# Patient Record
Sex: MALE | Race: WHITE | NOT HISPANIC OR LATINO | Employment: OTHER | URBAN - METROPOLITAN AREA
[De-identification: names, ages, dates, MRNs, and addresses within clinical notes are randomized per-mention and may not be internally consistent; named-entity substitution may affect disease eponyms.]

---

## 2023-03-09 ENCOUNTER — TELEPHONE (OUTPATIENT)
Dept: PRIMARY CARE | Facility: CLINIC | Age: 68
End: 2023-03-09
Payer: MEDICARE

## 2023-05-17 DIAGNOSIS — E11.9 TYPE 2 DIABETES MELLITUS WITHOUT COMPLICATION, WITH LONG-TERM CURRENT USE OF INSULIN (MULTI): Primary | ICD-10-CM

## 2023-05-17 DIAGNOSIS — Z79.4 TYPE 2 DIABETES MELLITUS WITHOUT COMPLICATION, WITH LONG-TERM CURRENT USE OF INSULIN (MULTI): Primary | ICD-10-CM

## 2023-05-17 PROBLEM — L03.115 CELLULITIS OF RIGHT LOWER EXTREMITY: Status: ACTIVE | Noted: 2023-05-17

## 2023-05-17 PROBLEM — F91.9 BEHAVIORAL DISORDER AS SEQUELA OF CEREBRAL INFARCTION: Status: ACTIVE | Noted: 2023-05-17

## 2023-05-17 PROBLEM — I63.9 CEREBROVASCULAR ACCIDENT (CVA) (MULTI): Status: ACTIVE | Noted: 2023-05-17

## 2023-05-17 PROBLEM — R41.841 COGNITIVE COMMUNICATION DEFICIT: Status: ACTIVE | Noted: 2023-05-17

## 2023-05-17 PROBLEM — I69.30 H/O: STROKE WITH RESIDUAL EFFECTS: Status: ACTIVE | Noted: 2023-05-17

## 2023-05-17 PROBLEM — R48.2 APRAXIA: Status: ACTIVE | Noted: 2023-05-17

## 2023-05-17 PROBLEM — M17.11 OSTEOARTHRITIS OF RIGHT KNEE: Status: ACTIVE | Noted: 2023-05-17

## 2023-05-17 PROBLEM — I63.9 CARDIOEMBOLIC STROKE (MULTI): Status: ACTIVE | Noted: 2023-05-17

## 2023-05-17 PROBLEM — I69.398 BEHAVIORAL DISORDER AS SEQUELA OF CEREBRAL INFARCTION: Status: ACTIVE | Noted: 2023-05-17

## 2023-05-17 PROBLEM — I10 ESSENTIAL HYPERTENSION: Status: ACTIVE | Noted: 2023-05-17

## 2023-05-17 PROBLEM — R55 CONVULSIVE SYNCOPE: Status: ACTIVE | Noted: 2023-05-17

## 2023-05-17 PROBLEM — R26.89 DECREASED FUNCTIONAL MOBILITY: Status: ACTIVE | Noted: 2023-05-17

## 2023-05-17 PROBLEM — B07.8 COMMON WART: Status: ACTIVE | Noted: 2023-05-17

## 2023-05-17 PROBLEM — I69.322 DYSARTHRIA DUE TO RECENT STROKE: Status: ACTIVE | Noted: 2023-05-17

## 2023-05-17 PROBLEM — I25.10 CAD (CORONARY ARTERY DISEASE): Status: ACTIVE | Noted: 2023-05-17

## 2023-05-17 PROBLEM — M17.11 ARTHRITIS OF KNEE, RIGHT: Status: ACTIVE | Noted: 2023-05-17

## 2023-05-17 PROBLEM — I69.320 APHASIA, POST-STROKE: Status: ACTIVE | Noted: 2023-05-17

## 2023-05-17 PROBLEM — I35.0 AORTIC STENOSIS: Status: ACTIVE | Noted: 2023-05-17

## 2023-05-17 PROBLEM — R27.8 DECREASED COORDINATION: Status: ACTIVE | Noted: 2023-05-17

## 2023-05-17 PROBLEM — I42.9 CARDIOMYOPATHY (MULTI): Status: ACTIVE | Noted: 2023-05-17

## 2023-05-17 PROBLEM — J01.41 ACUTE RECURRENT PANSINUSITIS: Status: ACTIVE | Noted: 2023-05-17

## 2023-05-17 RX ORDER — LANCETS 33 GAUGE
EACH MISCELLANEOUS
COMMUNITY
Start: 2023-01-07 | End: 2024-02-11 | Stop reason: ENTERED-IN-ERROR

## 2023-05-17 RX ORDER — INSULIN GLARGINE 100 [IU]/ML
10 INJECTION, SOLUTION SUBCUTANEOUS NIGHTLY
Qty: 3 ML | Refills: 1 | Status: SHIPPED | OUTPATIENT
Start: 2023-05-17 | End: 2023-11-02

## 2023-05-17 RX ORDER — QUETIAPINE FUMARATE 25 MG/1
1 TABLET, FILM COATED ORAL NIGHTLY
COMMUNITY
Start: 2022-08-23 | End: 2023-11-28 | Stop reason: WASHOUT

## 2023-05-17 RX ORDER — BLOOD-GLUCOSE METER
EACH MISCELLANEOUS
COMMUNITY
Start: 2020-10-21 | End: 2024-02-11 | Stop reason: ENTERED-IN-ERROR

## 2023-05-17 RX ORDER — INSULIN GLARGINE 100 [IU]/ML
INJECTION, SOLUTION SUBCUTANEOUS
Qty: 3 ML | OUTPATIENT
Start: 2023-05-17

## 2023-05-17 RX ORDER — RIVAROXABAN 20 MG/1
1 TABLET, FILM COATED ORAL EVERY EVENING
COMMUNITY
End: 2024-02-29 | Stop reason: SDUPTHER

## 2023-05-17 RX ORDER — METFORMIN HYDROCHLORIDE 500 MG/1
1 TABLET, EXTENDED RELEASE ORAL EVERY EVENING
COMMUNITY
Start: 2020-10-21 | End: 2024-02-19

## 2023-05-17 RX ORDER — ASPIRIN 81 MG/1
1 TABLET ORAL DAILY
COMMUNITY
Start: 2022-08-01 | End: 2023-08-23 | Stop reason: ALTCHOICE

## 2023-05-17 RX ORDER — INSULIN GLARGINE 100 [IU]/ML
INJECTION, SOLUTION SUBCUTANEOUS
COMMUNITY
Start: 2022-10-13 | End: 2023-05-17 | Stop reason: SDUPTHER

## 2023-05-17 RX ORDER — CLOPIDOGREL BISULFATE 75 MG/1
TABLET ORAL
COMMUNITY
Start: 2022-10-13 | End: 2023-07-10

## 2023-05-17 RX ORDER — PEN NEEDLE, DIABETIC 31 GX5/16"
NEEDLE, DISPOSABLE MISCELLANEOUS
COMMUNITY
Start: 2023-02-27 | End: 2023-08-16

## 2023-05-17 RX ORDER — METOPROLOL SUCCINATE 50 MG/1
1 TABLET, EXTENDED RELEASE ORAL DAILY
COMMUNITY
Start: 2020-11-02 | End: 2023-12-07 | Stop reason: SDUPTHER

## 2023-05-17 RX ORDER — FENOFIBRATE 160 MG/1
1 TABLET ORAL DAILY
COMMUNITY
Start: 2021-02-17 | End: 2023-05-19

## 2023-05-17 RX ORDER — TAMSULOSIN HYDROCHLORIDE 0.4 MG/1
1 CAPSULE ORAL NIGHTLY
COMMUNITY
Start: 2021-02-01 | End: 2023-07-10

## 2023-05-17 RX ORDER — MULTIVIT-MIN/IRON FUM/FOLIC AC 7.5 MG-4
1 TABLET ORAL EVERY MORNING
COMMUNITY

## 2023-05-17 RX ORDER — ATORVASTATIN CALCIUM 80 MG/1
1 TABLET, FILM COATED ORAL DAILY
COMMUNITY
Start: 2021-02-01 | End: 2024-01-30

## 2023-05-17 RX ORDER — LOSARTAN POTASSIUM 25 MG/1
1 TABLET ORAL DAILY
COMMUNITY
Start: 2021-02-01 | End: 2023-12-15

## 2023-05-17 RX ORDER — LANOLIN ALCOHOL/MO/W.PET/CERES
CREAM (GRAM) TOPICAL
COMMUNITY
Start: 2022-10-13 | End: 2024-02-11 | Stop reason: ENTERED-IN-ERROR

## 2023-05-17 RX ORDER — FUROSEMIDE 40 MG/1
1 TABLET ORAL DAILY
COMMUNITY
Start: 2021-02-01 | End: 2023-12-15

## 2023-05-25 ENCOUNTER — OFFICE VISIT (OUTPATIENT)
Dept: PRIMARY CARE | Facility: CLINIC | Age: 68
End: 2023-05-25
Payer: MEDICARE

## 2023-05-25 VITALS
OXYGEN SATURATION: 97 % | HEIGHT: 68 IN | BODY MASS INDEX: 28.22 KG/M2 | WEIGHT: 186.2 LBS | SYSTOLIC BLOOD PRESSURE: 120 MMHG | HEART RATE: 76 BPM | DIASTOLIC BLOOD PRESSURE: 80 MMHG | TEMPERATURE: 97.5 F

## 2023-05-25 DIAGNOSIS — I35.0 NONRHEUMATIC AORTIC VALVE STENOSIS: ICD-10-CM

## 2023-05-25 DIAGNOSIS — Z11.59 NEED FOR HEPATITIS C SCREENING TEST: ICD-10-CM

## 2023-05-25 DIAGNOSIS — I25.10 CORONARY ARTERY DISEASE INVOLVING NATIVE HEART WITHOUT ANGINA PECTORIS, UNSPECIFIED VESSEL OR LESION TYPE: ICD-10-CM

## 2023-05-25 DIAGNOSIS — Z79.4 TYPE 2 DIABETES MELLITUS WITHOUT COMPLICATION, WITH LONG-TERM CURRENT USE OF INSULIN (MULTI): Primary | ICD-10-CM

## 2023-05-25 DIAGNOSIS — I10 ESSENTIAL HYPERTENSION: ICD-10-CM

## 2023-05-25 DIAGNOSIS — I69.322 DYSARTHRIA DUE TO RECENT STROKE: ICD-10-CM

## 2023-05-25 DIAGNOSIS — I63.9 CARDIOEMBOLIC STROKE (MULTI): ICD-10-CM

## 2023-05-25 DIAGNOSIS — Z12.5 SCREENING FOR PROSTATE CANCER: ICD-10-CM

## 2023-05-25 DIAGNOSIS — Z00.00 ROUTINE GENERAL MEDICAL EXAMINATION AT HEALTH CARE FACILITY: ICD-10-CM

## 2023-05-25 DIAGNOSIS — I25.5 ISCHEMIC CARDIOMYOPATHY: ICD-10-CM

## 2023-05-25 DIAGNOSIS — E11.9 TYPE 2 DIABETES MELLITUS WITHOUT COMPLICATION, WITH LONG-TERM CURRENT USE OF INSULIN (MULTI): Primary | ICD-10-CM

## 2023-05-25 LAB — HBA1C MFR BLD: 8.8 % (ref 4.2–6.5)

## 2023-05-25 PROCEDURE — 99497 ADVNCD CARE PLAN 30 MIN: CPT | Performed by: FAMILY MEDICINE

## 2023-05-25 PROCEDURE — 3074F SYST BP LT 130 MM HG: CPT | Performed by: FAMILY MEDICINE

## 2023-05-25 PROCEDURE — 2028F FOOT EXAM PERFORMED: CPT | Performed by: FAMILY MEDICINE

## 2023-05-25 PROCEDURE — G0439 PPPS, SUBSEQ VISIT: HCPCS | Performed by: FAMILY MEDICINE

## 2023-05-25 PROCEDURE — 99214 OFFICE O/P EST MOD 30 MIN: CPT | Performed by: FAMILY MEDICINE

## 2023-05-25 PROCEDURE — G0442 ANNUAL ALCOHOL SCREEN 15 MIN: HCPCS | Performed by: FAMILY MEDICINE

## 2023-05-25 PROCEDURE — 1158F ADVNC CARE PLAN TLK DOCD: CPT | Performed by: FAMILY MEDICINE

## 2023-05-25 PROCEDURE — 83036 HEMOGLOBIN GLYCOSYLATED A1C: CPT | Performed by: FAMILY MEDICINE

## 2023-05-25 PROCEDURE — 1160F RVW MEDS BY RX/DR IN RCRD: CPT | Performed by: FAMILY MEDICINE

## 2023-05-25 PROCEDURE — 1170F FXNL STATUS ASSESSED: CPT | Performed by: FAMILY MEDICINE

## 2023-05-25 PROCEDURE — 1036F TOBACCO NON-USER: CPT | Performed by: FAMILY MEDICINE

## 2023-05-25 PROCEDURE — 3052F HG A1C>EQUAL 8.0%<EQUAL 9.0%: CPT | Performed by: FAMILY MEDICINE

## 2023-05-25 PROCEDURE — G0446 INTENS BEHAVE THER CARDIO DX: HCPCS | Performed by: FAMILY MEDICINE

## 2023-05-25 PROCEDURE — 4010F ACE/ARB THERAPY RXD/TAKEN: CPT | Performed by: FAMILY MEDICINE

## 2023-05-25 PROCEDURE — 3079F DIAST BP 80-89 MM HG: CPT | Performed by: FAMILY MEDICINE

## 2023-05-25 PROCEDURE — 1159F MED LIST DOCD IN RCRD: CPT | Performed by: FAMILY MEDICINE

## 2023-05-25 ASSESSMENT — PATIENT HEALTH QUESTIONNAIRE - PHQ9
SUM OF ALL RESPONSES TO PHQ9 QUESTIONS 1 & 2: 0
1. LITTLE INTEREST OR PLEASURE IN DOING THINGS: NOT AT ALL
SUM OF ALL RESPONSES TO PHQ9 QUESTIONS 1 AND 2: 0
1. LITTLE INTEREST OR PLEASURE IN DOING THINGS: NOT AT ALL
2. FEELING DOWN, DEPRESSED OR HOPELESS: NOT AT ALL
2. FEELING DOWN, DEPRESSED OR HOPELESS: NOT AT ALL

## 2023-05-25 ASSESSMENT — ACTIVITIES OF DAILY LIVING (ADL)
DOING_HOUSEWORK: NEEDS ASSISTANCE
TAKING_MEDICATION: INDEPENDENT
GROCERY_SHOPPING: NEEDS ASSISTANCE
BATHING: INDEPENDENT
DRESSING: INDEPENDENT
MANAGING_FINANCES: NEEDS ASSISTANCE

## 2023-05-25 ASSESSMENT — LIFESTYLE VARIABLES
HOW OFTEN DO YOU HAVE SIX OR MORE DRINKS ON ONE OCCASION: NEVER
HOW OFTEN DO YOU HAVE A DRINK CONTAINING ALCOHOL: NEVER

## 2023-05-25 ASSESSMENT — ENCOUNTER SYMPTOMS
OCCASIONAL FEELINGS OF UNSTEADINESS: 0
LOSS OF SENSATION IN FEET: 0
DEPRESSION: 0

## 2023-05-25 ASSESSMENT — PAIN SCALES - GENERAL: PAINLEVEL: 2

## 2023-05-25 NOTE — ACP (ADVANCE CARE PLANNING)
Advance Care Planning Note     Discussion Date: 05/25/23   Discussion Participants: patient    The patient wishes to discuss Advance Care Planning today and the following is a brief summary of our discussion.     Patient has capacity to make their own medical decisions: Yes  Health Care Agent/Surrogate Decision Maker documented in chart: Yes    Documents on file and valid:  Advance Directive/Living Will: Yes   Health Care Power of : Yes  Other:     Communication of Medical Status/Prognosis:   Prognosis is fair    Communication of Treatment Goals/Options:   Discussed treatment goals with patient and his daughter   Treatment Decisions  Treatment discussed    Time Statement: Total face to face time spent on advance care planning was 16 minutes with 16 minutes spent in counseling, including the explanation.    Kwabena Gomez DO  5/25/2023 12:00 PM

## 2023-05-25 NOTE — PROGRESS NOTES
"Subjective   Reason for Visit: Isaiah Jin is an 67 y.o. male here for a Medicare Wellness visit.     Past Medical, Surgical, and Family History reviewed and updated in chart.    Reviewed all medications by prescribing practitioner or clinical pharmacist (such as prescriptions, OTCs, herbal therapies and supplements) and documented in the medical record.    HPI  67-year-old male for Medicare wellness visit and checkup on diabetes, dysarthria from stroke.  Patient Care Team:  Kwabena ZIMMERMAN DO as PCP - General  [facility]IMMERMAN [facility]     Review of Systems  Constitutional: no chills, no fever and no night sweats.   Eyes: no blurred vision and no eyesight problems.   ENT: no hearing loss, no nasal congestion, no nasal discharge, no hoarseness and no sore throat.   Cardiovascular: no chest pain, no intermittent leg claudication, no lower extremity edema, no palpitations and no syncope.   Respiratory: no cough, no shortness of breath during exertion, no shortness of breath at rest and no wheezing.   Gastrointestinal: no abdominal pain, no blood in stools, no constipation, no diarrhea, no melena, no nausea, no rectal pain and no vomiting.   Genitourinary: no dysuria, no change in urinary frequency, no urinary hesitancy and no feelings of urinary urgency.   Neurological: no difficulty walking, no headache, no limb weakness, no numbness and no tingling.   Psychiatric: no anxiety, no depression, no anhedonia and no substance use disorders.   Endocrine: no recent weight gain and no recent weight loss.   Hematologic/Lymphatic: no tendency for easy bruising and no swollen glands.  Objective   Vitals:  /80 (BP Location: Left arm, Patient Position: Sitting, BP Cuff Size: Adult)   Pulse 76   Temp 36.4 °C (97.5 °F) (Temporal)   Ht 1.727 m (5' 8\")   Wt 84.5 kg (186 lb 3.2 oz)   SpO2 97%   BMI 28.31 kg/m²       Physical Exam  Constitutional: Alert and in no acute distress. " Well developed, well nourished.   Eyes: Pupils were equal in size, round, reactive to light (PERRL) with normal accommodation and extraocular movements intact (EOMI). Ophthalmoscopic examination: Normal.   Ears, Nose, Mouth, and Throat: External inspection of ears and nose: Normal. Otoscopic examination: Normal. Lips, teeth, and gums: Normal. Oropharynx: Normal.   Neck: No neck mass was observed. Supple. Thyroid not enlarged and there were no palpable thyroid nodules.   Cardiovascular: Heart rate and rhythm were normal, normal S1 and S2, no gallops, 2/6 SEmurmur and no pericardial rub. Carotid pulses: Normal with no bruits. Abdominal aorta: Normal. Pedal pulses: Normal. No peripheral edema.   Pulmonary: No respiratory distress. Clear bilateral breath sounds.   Abdomen: Soft nontender; no abdominal mass palpated. Normal bowel sounds. No organomegaly.   Skin: Normal skin color and pigmentation, normal skin turgor, and no rash.   Psychiatric: Judgment and insight: Intact. Mood and affect: Normal.  Assessment/Plan   Problem List Items Addressed This Visit          Nervous    Cardioembolic stroke (CMS/HCC)    Relevant Orders    Referral to Speech Therapy    Dysarthria due to recent stroke    Relevant Orders    Referral to Speech Therapy       Circulatory    Aortic stenosis    CAD (coronary artery disease)    Cardiomyopathy (CMS/HCC)    Essential hypertension    Relevant Orders    Lipid Panel    Comprehensive Metabolic Panel    CBC and Auto Differential    Urinalysis with Reflex Microscopic       Endocrine/Metabolic    Diabetes mellitus type 2, uncomplicated (CMS/HCC) - Primary    Relevant Orders    POCT Glycosylated Hemoglobin (HGB A1C) docked device    Albumin, urine, random       Other    Need for hepatitis C screening test    Relevant Orders    Hepatitis C antibody    Screening for prostate cancer    Relevant Orders    Prostate Specific Antigen, Screen    Routine general medical examination at health care facility      #1.  Type 2 diabetes mellitus is stable, but hemoglobin A1c is elevated.  Patient will discontinue sweet drinks.  Continue current medications.  Follow-up in 3 months.  2.  Cardioembolic stroke symptoms are stable.  Continue current medication.  Follow-up in 6 months.  3.  I spent 15 minutes screening for alcohol use.  4.  I spent 15 minutes face-to-face with this individual discussing the cardiovascular risk and behavioral therapies of nutritional choices, exercise, and elimination of habits contributing to risk. We agreed on a plan how they may be able to reduce their current cardiovascular risk. Aspirin use was discussed and encouraged. Patient's 10 year cardiovascular risk estimate calculates at 23.2%  5.  Ischemic cardiomyopathy is stable.  Symptoms stable.  Follow-up in 6 months.

## 2023-05-26 ENCOUNTER — LAB (OUTPATIENT)
Dept: LAB | Facility: LAB | Age: 68
End: 2023-05-26
Payer: MEDICARE

## 2023-05-26 DIAGNOSIS — Z79.4 TYPE 2 DIABETES MELLITUS WITHOUT COMPLICATION, WITH LONG-TERM CURRENT USE OF INSULIN (MULTI): ICD-10-CM

## 2023-05-26 DIAGNOSIS — Z11.59 NEED FOR HEPATITIS C SCREENING TEST: ICD-10-CM

## 2023-05-26 DIAGNOSIS — I10 ESSENTIAL HYPERTENSION: ICD-10-CM

## 2023-05-26 DIAGNOSIS — E11.9 TYPE 2 DIABETES MELLITUS WITHOUT COMPLICATION, WITH LONG-TERM CURRENT USE OF INSULIN (MULTI): ICD-10-CM

## 2023-05-26 DIAGNOSIS — Z12.5 SCREENING FOR PROSTATE CANCER: ICD-10-CM

## 2023-05-26 LAB
ALANINE AMINOTRANSFERASE (SGPT) (U/L) IN SER/PLAS: 18 U/L (ref 10–52)
ALBUMIN (G/DL) IN SER/PLAS: 4.5 G/DL (ref 3.4–5)
ALBUMIN (MG/L) IN URINE: 24.5 MG/L
ALBUMIN/CREATININE (UG/MG) IN URINE: 12 UG/MG CRT (ref 0–30)
ALKALINE PHOSPHATASE (U/L) IN SER/PLAS: 59 U/L (ref 33–136)
ANION GAP IN SER/PLAS: 14 MMOL/L (ref 10–20)
APPEARANCE, URINE: NORMAL
ASPARTATE AMINOTRANSFERASE (SGOT) (U/L) IN SER/PLAS: 15 U/L (ref 9–39)
BASOPHILS (10*3/UL) IN BLOOD BY AUTOMATED COUNT: 0.04 X10E9/L (ref 0–0.1)
BASOPHILS/100 LEUKOCYTES IN BLOOD BY AUTOMATED COUNT: 0.5 % (ref 0–2)
BILIRUBIN TOTAL (MG/DL) IN SER/PLAS: 0.5 MG/DL (ref 0–1.2)
BILIRUBIN, URINE: NEGATIVE
BLOOD, URINE: NEGATIVE
CALCIUM (MG/DL) IN SER/PLAS: 9.7 MG/DL (ref 8.6–10.3)
CARBON DIOXIDE, TOTAL (MMOL/L) IN SER/PLAS: 27 MMOL/L (ref 21–32)
CHLORIDE (MMOL/L) IN SER/PLAS: 102 MMOL/L (ref 98–107)
CHOLESTEROL (MG/DL) IN SER/PLAS: 133 MG/DL (ref 0–199)
CHOLESTEROL IN HDL (MG/DL) IN SER/PLAS: 28.6 MG/DL
CHOLESTEROL/HDL RATIO: 4.7
COLOR, URINE: NORMAL
CREATININE (MG/DL) IN SER/PLAS: 1.24 MG/DL (ref 0.5–1.3)
CREATININE (MG/DL) IN URINE: 204 MG/DL (ref 20–370)
EOSINOPHILS (10*3/UL) IN BLOOD BY AUTOMATED COUNT: 0.29 X10E9/L (ref 0–0.7)
EOSINOPHILS/100 LEUKOCYTES IN BLOOD BY AUTOMATED COUNT: 3.8 % (ref 0–6)
ERYTHROCYTE DISTRIBUTION WIDTH (RATIO) BY AUTOMATED COUNT: 12.2 % (ref 11.5–14.5)
ERYTHROCYTE MEAN CORPUSCULAR HEMOGLOBIN CONCENTRATION (G/DL) BY AUTOMATED: 32.9 G/DL (ref 32–36)
ERYTHROCYTE MEAN CORPUSCULAR VOLUME (FL) BY AUTOMATED COUNT: 90 FL (ref 80–100)
ERYTHROCYTES (10*6/UL) IN BLOOD BY AUTOMATED COUNT: 4.4 X10E12/L (ref 4.5–5.9)
GFR MALE: 63 ML/MIN/1.73M2
GLUCOSE (MG/DL) IN SER/PLAS: 191 MG/DL (ref 74–99)
GLUCOSE, URINE: NEGATIVE MG/DL
HEMATOCRIT (%) IN BLOOD BY AUTOMATED COUNT: 39.8 % (ref 41–52)
HEMOGLOBIN (G/DL) IN BLOOD: 13.1 G/DL (ref 13.5–17.5)
HEPATITIS C VIRUS AB PRESENCE IN SERUM: NONREACTIVE
IMMATURE GRANULOCYTES/100 LEUKOCYTES IN BLOOD BY AUTOMATED COUNT: 0.4 % (ref 0–0.9)
KETONES, URINE: NEGATIVE MG/DL
LDL: 28 MG/DL (ref 0–99)
LEUKOCYTE ESTERASE, URINE: NEGATIVE
LEUKOCYTES (10*3/UL) IN BLOOD BY AUTOMATED COUNT: 7.6 X10E9/L (ref 4.4–11.3)
LYMPHOCYTES (10*3/UL) IN BLOOD BY AUTOMATED COUNT: 1.75 X10E9/L (ref 1.2–4.8)
LYMPHOCYTES/100 LEUKOCYTES IN BLOOD BY AUTOMATED COUNT: 23.1 % (ref 13–44)
MONOCYTES (10*3/UL) IN BLOOD BY AUTOMATED COUNT: 0.73 X10E9/L (ref 0.1–1)
MONOCYTES/100 LEUKOCYTES IN BLOOD BY AUTOMATED COUNT: 9.6 % (ref 2–10)
NEUTROPHILS (10*3/UL) IN BLOOD BY AUTOMATED COUNT: 4.74 X10E9/L (ref 1.2–7.7)
NEUTROPHILS/100 LEUKOCYTES IN BLOOD BY AUTOMATED COUNT: 62.6 % (ref 40–80)
NITRITE, URINE: NEGATIVE
NON HDL CHOLESTEROL: 104 MG/DL
NRBC (PER 100 WBCS) BY AUTOMATED COUNT: 0 /100 WBC (ref 0–0)
PH, URINE: 5 (ref 5–8)
PLATELETS (10*3/UL) IN BLOOD AUTOMATED COUNT: 271 X10E9/L (ref 150–450)
POTASSIUM (MMOL/L) IN SER/PLAS: 4.3 MMOL/L (ref 3.5–5.3)
PROSTATE SPECIFIC ANTIGEN,SCREEN: 0.86 NG/ML (ref 0–4)
PROTEIN TOTAL: 7.1 G/DL (ref 6.4–8.2)
PROTEIN, URINE: NEGATIVE MG/DL
SODIUM (MMOL/L) IN SER/PLAS: 139 MMOL/L (ref 136–145)
SPECIFIC GRAVITY, URINE: 1.02 (ref 1–1.03)
TRIGLYCERIDE (MG/DL) IN SER/PLAS: 380 MG/DL (ref 0–149)
UREA NITROGEN (MG/DL) IN SER/PLAS: 20 MG/DL (ref 6–23)
UROBILINOGEN, URINE: <2 MG/DL (ref 0–1.9)
VLDL: 76 MG/DL (ref 0–40)

## 2023-05-26 PROCEDURE — 86803 HEPATITIS C AB TEST: CPT

## 2023-05-26 PROCEDURE — 80053 COMPREHEN METABOLIC PANEL: CPT

## 2023-05-26 PROCEDURE — 85025 COMPLETE CBC W/AUTO DIFF WBC: CPT

## 2023-05-26 PROCEDURE — 80061 LIPID PANEL: CPT

## 2023-05-26 PROCEDURE — 84153 ASSAY OF PSA TOTAL: CPT

## 2023-05-26 PROCEDURE — 82570 ASSAY OF URINE CREATININE: CPT

## 2023-05-26 PROCEDURE — 36415 COLL VENOUS BLD VENIPUNCTURE: CPT

## 2023-05-26 PROCEDURE — 82043 UR ALBUMIN QUANTITATIVE: CPT

## 2023-05-26 PROCEDURE — 81003 URINALYSIS AUTO W/O SCOPE: CPT

## 2023-07-09 DIAGNOSIS — Z00.00 ENCOUNTER FOR GENERAL ADULT MEDICAL EXAMINATION WITHOUT ABNORMAL FINDINGS: ICD-10-CM

## 2023-07-09 DIAGNOSIS — I25.10 ATHEROSCLEROTIC HEART DISEASE OF NATIVE CORONARY ARTERY WITHOUT ANGINA PECTORIS: ICD-10-CM

## 2023-07-10 RX ORDER — CLOPIDOGREL BISULFATE 75 MG/1
TABLET ORAL
Qty: 90 TABLET | Refills: 1 | Status: SHIPPED | OUTPATIENT
Start: 2023-07-10 | End: 2024-01-30

## 2023-07-10 RX ORDER — TAMSULOSIN HYDROCHLORIDE 0.4 MG/1
CAPSULE ORAL
Qty: 90 CAPSULE | Refills: 1 | Status: SHIPPED | OUTPATIENT
Start: 2023-07-10 | End: 2024-01-30

## 2023-08-16 DIAGNOSIS — E11.9 TYPE 2 DIABETES MELLITUS WITHOUT COMPLICATIONS (MULTI): ICD-10-CM

## 2023-08-16 RX ORDER — PEN NEEDLE, DIABETIC 31 GX5/16"
NEEDLE, DISPOSABLE MISCELLANEOUS
Qty: 100 EACH | Refills: 3 | Status: SHIPPED | OUTPATIENT
Start: 2023-08-16

## 2023-08-23 ENCOUNTER — OFFICE VISIT (OUTPATIENT)
Dept: PRIMARY CARE | Facility: CLINIC | Age: 68
End: 2023-08-23
Payer: MEDICARE

## 2023-08-23 VITALS
TEMPERATURE: 97.3 F | WEIGHT: 182 LBS | OXYGEN SATURATION: 96 % | SYSTOLIC BLOOD PRESSURE: 110 MMHG | HEIGHT: 68 IN | BODY MASS INDEX: 27.58 KG/M2 | HEART RATE: 65 BPM | DIASTOLIC BLOOD PRESSURE: 64 MMHG

## 2023-08-23 DIAGNOSIS — F39 UNSPECIFIED MOOD (AFFECTIVE) DISORDER (CMS-HCC): ICD-10-CM

## 2023-08-23 DIAGNOSIS — I25.5 ISCHEMIC CARDIOMYOPATHY: ICD-10-CM

## 2023-08-23 DIAGNOSIS — J81.0 ACUTE PULMONARY EDEMA (MULTI): ICD-10-CM

## 2023-08-23 DIAGNOSIS — I11.0 HYPERTENSIVE HEART DISEASE WITH HEART FAILURE (MULTI): ICD-10-CM

## 2023-08-23 DIAGNOSIS — I69.354 HEMIPLEGIA AND HEMIPARESIS FOLLOWING CEREBRAL INFARCTION AFFECTING LEFT NON-DOMINANT SIDE (MULTI): ICD-10-CM

## 2023-08-23 DIAGNOSIS — Z99.2 DEPENDENCE ON RENAL DIALYSIS (CMS-HCC): ICD-10-CM

## 2023-08-23 DIAGNOSIS — J69.0 PNEUMONITIS DUE TO INHALATION OF FOOD AND VOMIT (MULTI): ICD-10-CM

## 2023-08-23 DIAGNOSIS — E11.9 TYPE 2 DIABETES MELLITUS WITHOUT COMPLICATION, WITH LONG-TERM CURRENT USE OF INSULIN (MULTI): Primary | ICD-10-CM

## 2023-08-23 DIAGNOSIS — Z79.4 TYPE 2 DIABETES MELLITUS WITHOUT COMPLICATION, WITH LONG-TERM CURRENT USE OF INSULIN (MULTI): Primary | ICD-10-CM

## 2023-08-23 DIAGNOSIS — Z89.429 ACQUIRED ABSENCE OF OTHER TOE(S), UNSPECIFIED SIDE (MULTI): ICD-10-CM

## 2023-08-23 DIAGNOSIS — I63.9 RIGHT HEMISPHERE, CEREBRAL INFARCTION (MULTI): ICD-10-CM

## 2023-08-23 DIAGNOSIS — I71.20 THORACIC AORTIC ANEURYSM (TAA), UNSPECIFIED PART, UNSPECIFIED WHETHER RUPTURED (CMS-HCC): ICD-10-CM

## 2023-08-23 DIAGNOSIS — I63.119 CEREBROVASCULAR ACCIDENT (CVA) DUE TO EMBOLISM OF VERTEBRAL ARTERY, UNSPECIFIED BLOOD VESSEL LATERALITY (MULTI): ICD-10-CM

## 2023-08-23 DIAGNOSIS — E44.1 MILD PROTEIN-CALORIE MALNUTRITION (MULTI): ICD-10-CM

## 2023-08-23 DIAGNOSIS — I63.9 CARDIOEMBOLIC STROKE (MULTI): ICD-10-CM

## 2023-08-23 DIAGNOSIS — I50.9 HEART FAILURE, UNSPECIFIED HF CHRONICITY, UNSPECIFIED HEART FAILURE TYPE (MULTI): ICD-10-CM

## 2023-08-23 PROBLEM — F06.30 MOOD DISORDER DUE TO KNOWN PHYSIOLOGICAL CONDITION, UNSPECIFIED: Status: ACTIVE | Noted: 2022-09-11

## 2023-08-23 PROBLEM — E78.5 HYPERLIPIDEMIA, UNSPECIFIED: Status: ACTIVE | Noted: 2022-09-11

## 2023-08-23 PROBLEM — R47.1 DYSARTHRIA AND ANARTHRIA: Status: ACTIVE | Noted: 2022-09-11

## 2023-08-23 PROBLEM — S81.801A WOUND OF RIGHT LEG: Status: ACTIVE | Noted: 2023-08-23

## 2023-08-23 PROBLEM — R01.1 HEART MURMUR PREVIOUSLY UNDIAGNOSED: Status: ACTIVE | Noted: 2023-08-23

## 2023-08-23 PROBLEM — R55 SYNCOPE, CONVULSIVE: Status: ACTIVE | Noted: 2022-09-11

## 2023-08-23 PROBLEM — I25.10 ATHEROSCLEROTIC HEART DISEASE OF NATIVE CORONARY ARTERY WITHOUT ANGINA PECTORIS: Status: ACTIVE | Noted: 2022-09-11

## 2023-08-23 PROBLEM — N39.0 URINARY TRACT INFECTION: Status: ACTIVE | Noted: 2023-08-23

## 2023-08-23 PROBLEM — J98.11 ATELECTASIS: Status: ACTIVE | Noted: 2022-09-11

## 2023-08-23 PROBLEM — I35.0 NONRHEUMATIC AORTIC (VALVE) STENOSIS: Status: ACTIVE | Noted: 2022-09-11

## 2023-08-23 PROBLEM — G93.41 METABOLIC ENCEPHALOPATHY: Status: ACTIVE | Noted: 2022-09-11

## 2023-08-23 PROBLEM — I25.10 ATHSCL HEART DISEASE OF NATIVE CORONARY ARTERY W/O ANG PCTRS: Status: ACTIVE | Noted: 2022-09-27

## 2023-08-23 PROBLEM — N40.0 BENIGN PROSTATIC HYPERPLASIA WITHOUT LOWER URINARY TRACT SYMPTOMS: Status: ACTIVE | Noted: 2022-09-27

## 2023-08-23 PROBLEM — M25.561 RIGHT KNEE PAIN: Status: ACTIVE | Noted: 2023-08-23

## 2023-08-23 PROBLEM — I10 ESSENTIAL (PRIMARY) HYPERTENSION: Status: ACTIVE | Noted: 2022-09-11

## 2023-08-23 PROBLEM — I34.0 MITRAL VALVE REGURGITATION: Status: ACTIVE | Noted: 2023-08-23

## 2023-08-23 PROBLEM — D17.20 LIPOMA OF EXTREMITY: Status: ACTIVE | Noted: 2023-08-23

## 2023-08-23 PROBLEM — Z95.2 S/P AVR (AORTIC VALVE REPLACEMENT): Status: ACTIVE | Noted: 2023-08-23

## 2023-08-23 PROBLEM — H53.40 VISUAL FIELD LOSS: Status: ACTIVE | Noted: 2023-08-23

## 2023-08-23 PROBLEM — Z95.1 S/P CABG X 4: Status: ACTIVE | Noted: 2023-08-23

## 2023-08-23 PROBLEM — I69.391 DYSPHAGIA FOLLOWING CEREBRAL INFARCTION: Status: ACTIVE | Noted: 2022-09-11

## 2023-08-23 PROBLEM — M19.90 OSTEOARTHRITIS: Status: ACTIVE | Noted: 2022-09-27

## 2023-08-23 PROBLEM — Z95.828 S/P ASCENDING AORTIC REPLACEMENT: Status: ACTIVE | Noted: 2023-08-23

## 2023-08-23 PROBLEM — E78.2 MIXED HYPERLIPIDEMIA: Status: ACTIVE | Noted: 2023-08-23

## 2023-08-23 PROBLEM — I42.9 CARDIOMYOPATHY, UNSPECIFIED (MULTI): Status: ACTIVE | Noted: 2022-09-11

## 2023-08-23 LAB — POC HEMOGLOBIN A1C: 8.7 % (ref 4.2–6.5)

## 2023-08-23 PROCEDURE — 83036 HEMOGLOBIN GLYCOSYLATED A1C: CPT | Performed by: FAMILY MEDICINE

## 2023-08-23 PROCEDURE — 1126F AMNT PAIN NOTED NONE PRSNT: CPT | Performed by: FAMILY MEDICINE

## 2023-08-23 PROCEDURE — 1159F MED LIST DOCD IN RCRD: CPT | Performed by: FAMILY MEDICINE

## 2023-08-23 PROCEDURE — 2028F FOOT EXAM PERFORMED: CPT | Performed by: FAMILY MEDICINE

## 2023-08-23 PROCEDURE — 3052F HG A1C>EQUAL 8.0%<EQUAL 9.0%: CPT | Performed by: FAMILY MEDICINE

## 2023-08-23 PROCEDURE — 3078F DIAST BP <80 MM HG: CPT | Performed by: FAMILY MEDICINE

## 2023-08-23 PROCEDURE — 99215 OFFICE O/P EST HI 40 MIN: CPT | Performed by: FAMILY MEDICINE

## 2023-08-23 PROCEDURE — 4010F ACE/ARB THERAPY RXD/TAKEN: CPT | Performed by: FAMILY MEDICINE

## 2023-08-23 PROCEDURE — 3074F SYST BP LT 130 MM HG: CPT | Performed by: FAMILY MEDICINE

## 2023-08-23 PROCEDURE — 1160F RVW MEDS BY RX/DR IN RCRD: CPT | Performed by: FAMILY MEDICINE

## 2023-08-23 PROCEDURE — 1036F TOBACCO NON-USER: CPT | Performed by: FAMILY MEDICINE

## 2023-08-23 ASSESSMENT — PAIN SCALES - GENERAL: PAINLEVEL: 0-NO PAIN

## 2023-08-23 NOTE — PROGRESS NOTES
Subjective   Patient ID: Duane Jin is a 68 y.o. male who presents for Follow-up (Patient is here for 3 month follow up on A1C check).  HPI    Review of Systems    Visit Vitals  /64 (BP Location: Left arm, Patient Position: Sitting, BP Cuff Size: Adult)   Pulse 65   Temp 36.3 °C (97.3 °F) (Temporal)        Objective   Physical Exam    Assessment/Plan   Problem List Items Addressed This Visit       Cardioembolic stroke (CMS/HCC)    Cardiomyopathy, unspecified (CMS/HCC)    Cerebrovascular accident (CVA) (CMS/HCC)    Type 2 diabetes mellitus without complications (CMS/HCC) - Primary    Relevant Orders    POCT glycosylated hemoglobin (Hb A1C) manually resulted (Completed)    Thoracic aortic aneurysm (CMS/HCC)    Right hemisphere, cerebral infarction (CMS/HCC)    Pneumonitis due to inhalation of food and vomit (CMS/HCC)    Mild protein-calorie malnutrition (CMS/HCC)    Acute pulmonary edema (CMS/HCC)    Acquired absence of other toe(s), unspecified side (CMS/HCC)    Hypertensive heart disease with heart failure (CMS/HCC)    Heart failure (CMS/HCC)    Hemiplegia and hemiparesis following cerebral infarction affecting left non-dominant side (CMS/HCC)    Dependence on renal dialysis (CMS/HCC)    Unspecified mood (affective) disorder (CMS/HCC)     #1.  Hemoglobin A1c slightly improved.  Continue to monitor blood pressures and blood sugars.  Possible increase of basal insulin by 2 units every 3 days avoiding hypoglycemia.  His wife will call with recent morning hemoglobin A1c is  2.  Heart failure stable.  Continue current medication.  Follow-up in 6 months.  3.  Hemiplegia and hemiparalysis following stroke are stable.  Patient continues physical therapy also speech therapy.  Follow-up in 6 months.  4.  Cardioembolic stroke symptoms are stable at this time unchanged.  He will be changing care to a new neurologist due to his current neurologist retiring.  Follow-up in 6 months.  5.  Pneumonitis is stable.  Acute  pulmonary edema is stable.  No symptoms at this time.  Monitor for symptoms.  Follow-up in 6 months.  6.  Affective mood disorder stable.  Asymptomatic this time.  Monitor for symptoms.  Follow-up in 6 months.  7.  Mild protein calorie nutrition is improved.  Continue diet.  Follow-up in 6 months.  8.  Heart failure stable.  Monitor for symptoms.  Continue current medications.  Follow-up in 6 months.  9.  Thoracic aortic aneurysm is stable and asymptomatic at this time.  Continue current medication.  Follow-up in 6 months

## 2023-08-29 ENCOUNTER — TELEPHONE (OUTPATIENT)
Dept: PRIMARY CARE | Facility: CLINIC | Age: 68
End: 2023-08-29
Payer: MEDICARE

## 2023-08-29 DIAGNOSIS — E11.9 TYPE 2 DIABETES MELLITUS WITHOUT COMPLICATION, WITH LONG-TERM CURRENT USE OF INSULIN (MULTI): Primary | ICD-10-CM

## 2023-08-29 DIAGNOSIS — Z79.4 TYPE 2 DIABETES MELLITUS WITHOUT COMPLICATION, WITH LONG-TERM CURRENT USE OF INSULIN (MULTI): Primary | ICD-10-CM

## 2023-10-04 ENCOUNTER — EVALUATION (OUTPATIENT)
Dept: SPEECH THERAPY | Facility: CLINIC | Age: 68
End: 2023-10-04
Payer: MEDICARE

## 2023-10-04 DIAGNOSIS — I69.322 DYSARTHRIA DUE TO RECENT STROKE: ICD-10-CM

## 2023-10-04 DIAGNOSIS — R41.841 COGNITIVE COMMUNICATION DEFICIT: Primary | ICD-10-CM

## 2023-10-04 PROCEDURE — 92507 TX SP LANG VOICE COMM INDIV: CPT | Mod: GN,KX | Performed by: SPEECH-LANGUAGE PATHOLOGIST

## 2023-10-04 ASSESSMENT — PAIN - FUNCTIONAL ASSESSMENT: PAIN_FUNCTIONAL_ASSESSMENT: 0-10

## 2023-10-04 ASSESSMENT — PAIN SCALES - GENERAL: PAINLEVEL_OUTOF10: 0 - NO PAIN

## 2023-10-04 NOTE — Clinical Note
October 4, 2023     Patient: Isaiah Jin   YOB: 1955   Date of Visit: 10/4/2023       To Whom it May Concern:    Isaiah Jin was seen in my clinic on 10/4/2023. He {Return to school/sport:49223}.    If you have any questions or concerns, please don't hesitate to call.         Sincerely,          Flower Monteiro, SLP        CC: No Recipients

## 2023-10-04 NOTE — PROGRESS NOTES
Speech-Language Pathology    Outpatient Speech-Language Pathology Treatment     Patient Name: Duane Jin  MRN: [id number]  Today's Date: 10/4/2023     Time Calculation  Start Time: 0945  Stop Time: 1030  Time Calculation (min): 45 min    Patient is being seen for their first follow-up visit in [facility] this date. For full history, evaluation, and other details from previous care to-date, please refer to past medical records in Ambulatory Electronic Medical Records. Most recent eval/re-eval was completed on 8/18/2023.        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    CAD (coronary artery disease)    Cardioembolic stroke (CMS/HCC)    Cardiomyopathy, unspecified (CMS/HCC)    Cellulitis of right lower extremity    Cerebrovascular accident (CVA) (CMS/HCC)    Cognitive communication deficit    Common wart    Syncope, convulsive    Decreased coordination    Decreased functional mobility    Type 2 diabetes mellitus without complications (CMS/HCC)    Dysarthria due to recent stroke    Essential (primary) hypertension    H/O: stroke with residual effects    Need for hepatitis C screening test    Screening for prostate cancer    Routine general medical examination at health care facility    Athscl heart disease of native coronary artery w/o ang pctrs    Atherosclerotic heart disease of native coronary artery without angina pectoris    Dysarthria and anarthria    Nonrheumatic aortic (valve) stenosis    Wound of right leg    Visual field loss    Urinary tract infection    Thoracic aortic aneurysm (CMS/HCC)    S/P CABG x 4    S/P AVR (aortic valve replacement)    S/P ascending aortic replacement    Right knee pain    Right hemisphere, cerebral infarction (CMS/HCC)    Pneumonitis due to inhalation of food and  vomit (CMS/HCC)    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, unspecified    Mixed hyperlipidemia    Hyperlipidemia, unspecified    Mitral valve regurgitation    Mild protein-calorie malnutrition (CMS/HCC)    Metabolic encephalopathy    Lipoma of extremity    Heart murmur previously undiagnosed    Dysphagia following cerebral infarction    Atelectasis    Acute pulmonary edema (CMS/HCC)    Acquired absence of other toe(s), unspecified side (CMS/HCC)    Osteoarthritis    Hypertensive heart disease with heart failure (CMS/HCC)    Heart failure (CMS/[facility])    Benign prostatic hyperplasia without lower urinary tract symptoms    Hemiplegia and hemiparesis following cerebral infarction affecting left non-dominant side (CMS/HCC)    Dependence on renal dialysis (CMS/HCC)    Unspecified mood (affective) disorder (CMS/[facility])         SLP Assessment:   Patient arrived independently to the session this date. Patient was chatty and with less dysarthric speech upon arrival, though it became more dysarthric as time went on.   Patient was able to complete attention tasks pertaining to vision and sorting words by alphabetical order with 70% accuracy independently, increasing to 100% accuracy when provided with minimal verbal cueing.   Patient was able to complete visual attention tasks involving answering questions about a visual stimuli with 90% accuracy independently, increasing to 100% accuracy when provided with minimal verbal cueing.   Patient was able to complete a sequencing attention task with 60% accuracy independently, increasing to 95% accuracy when provided with minimal verbal cueing.        Plan:   Continue current plan of care.       Subjective   Patient was pleasant and participated well throughout the session this date.        General Visit Information:   Certification Period Start Date: 08/18/23  Certification Period End Date: 10/16/23  Total Number of Visits : 11      Pain Assessment:   Pain Assessment:  0-10  Pain Score: 0 - No pain      Objective   Progress to date:   By discharge LARY WOODALL will achieve the following goals:   Long Term Goals:  1. Patient will be able to adequately express himself in a variety of situations with a variety of individuals clearly and independently.     Short Term Goals:  1. Patient will be able to complete rapid alternating movements at 90% accuracy with min cues.   2. Patient will be clear in his speech at the conversational level of speech at 90% accuracy with min cues.   3. Patient will learn and utilize strategies to increase clarity of speech with return demo at 90%.  4. Patient will complete generative and convergent naming tasks with 80% accuracy and min-mod cues.   5. Patient will complete low to mid level cognitive tasks at 80% accuracy when provided with min to mod cues.   6. Patient/family education to be provided each session.     Outpatient Education:   Education was provided to pt/family and reviewed how to carryover strategies learned in session to home.

## 2023-10-04 NOTE — Clinical Note
October 4, 2023     Patient: Isaiah Jin   YOB: 1955   Date of Visit: 10/4/2023       To Whom It May Concern:    It is my medical opinion that Isaiah Jin {Work release (duty restriction):22613}.    If you have any questions or concerns, please don't hesitate to call.         Sincerely,        Flower Monteiro, SLP    CC: No Recipients

## 2023-10-06 ENCOUNTER — EVALUATION (OUTPATIENT)
Dept: SPEECH THERAPY | Facility: CLINIC | Age: 68
End: 2023-10-06
Payer: MEDICARE

## 2023-10-06 DIAGNOSIS — R41.841 COGNITIVE COMMUNICATION DEFICIT: Primary | ICD-10-CM

## 2023-10-06 DIAGNOSIS — I69.322 DYSARTHRIA DUE TO RECENT STROKE: ICD-10-CM

## 2023-10-06 PROCEDURE — 92507 TX SP LANG VOICE COMM INDIV: CPT | Mod: GN,KX | Performed by: SPEECH-LANGUAGE PATHOLOGIST

## 2023-10-06 ASSESSMENT — PAIN SCALES - GENERAL: PAINLEVEL_OUTOF10: 0 - NO PAIN

## 2023-10-06 ASSESSMENT — PAIN - FUNCTIONAL ASSESSMENT: PAIN_FUNCTIONAL_ASSESSMENT: 0-10

## 2023-10-06 NOTE — Clinical Note
October 6, 2023     Patient: Isaiah Jin   YOB: 1955   Date of Visit: 10/6/2023       To Whom it May Concern:    Isaiah Jin was seen in my clinic on 10/6/2023. He {Return to school/sport:34429}.    If you have any questions or concerns, please don't hesitate to call.         Sincerely,          Flower Monteiro, SLP        CC: No Recipients

## 2023-10-06 NOTE — Clinical Note
October 6, 2023     Patient: Isaiah Jin   YOB: 1955   Date of Visit: 10/6/2023       To Whom It May Concern:    It is my medical opinion that Isaiah Jin {Work release (duty restriction):24453}.    If you have any questions or concerns, please don't hesitate to call.         Sincerely,        Flower Monteiro, SLP    CC: No Recipients

## 2023-10-06 NOTE — PROGRESS NOTES
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    CAD (coronary artery disease)    Cardioembolic stroke (CMS/HCC)    Cardiomyopathy, unspecified (CMS/HCC)    Cellulitis of right lower extremity    Cerebrovascular accident (CVA) (CMS/HCC)    Cognitive communication deficit    Common wart    Syncope, convulsive    Decreased coordination    Decreased functional mobility    Type 2 diabetes mellitus without complications (CMS/HCC)    Dysarthria due to recent stroke    Essential (primary) hypertension    H/O: stroke with residual effects    Need for hepatitis C screening test    Screening for prostate cancer    Routine general medical examination at health care facility    Athscl heart disease of native coronary artery w/o ang pctrs    Atherosclerotic heart disease of native coronary artery without angina pectoris    Dysarthria and anarthria    Nonrheumatic aortic (valve) stenosis    Wound of right leg    Visual field loss    Urinary tract infection    Thoracic aortic aneurysm (CMS/HCC)    S/P CABG x 4    S/P AVR (aortic valve replacement)    S/P ascending aortic replacement    Right knee pain    Right hemisphere, cerebral infarction (CMS/HCC)    Pneumonitis due to inhalation of food and vomit (CMS/HCC)    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, unspecified    Mixed hyperlipidemia    Hyperlipidemia, unspecified    Mitral valve regurgitation    Mild protein-calorie malnutrition (CMS/HCC)    Metabolic encephalopathy    Lipoma of extremity    Heart murmur  previously undiagnosed    Dysphagia following cerebral infarction    Atelectasis    Acute pulmonary edema (CMS/HCC)    Acquired absence of other toe(s), unspecified side (CMS/HCC)    Osteoarthritis    Hypertensive heart disease with heart failure (CMS/HCC)    Heart failure (CMS/HCC)    Benign prostatic hyperplasia without lower urinary tract symptoms    Hemiplegia and hemiparesis following cerebral infarction affecting left non-dominant side (CMS/HCC)    Dependence on renal dialysis (CMS/HCC)    Unspecified mood (affective) disorder (CMS/HCC)         SLP Assessment:   The Cognitive Linguistic Quick Test + (CLQT+) was administered this date.  The pt scored as follows:  Domains:  Attention: 105 (moderate deficit)   Memory: 136 (moderate deficit)   Executive Functions: 8 (severe deficit)  Language: 28 (mild deficit)  Visuospatial Skills: 38 (severe deficit)  Clock Drawin (mild deficit)    This indicates that the patient continues to demonstrate difficulty with visual based tasks- this is likely in part of a true vision deficit as well as cognitive deficits. He improved in the area of story recall and generative naming tasks. He also improved in the area of the clock drawing, when compared to his last evaluation. He demonstrated more difficulty with symbol cancellation and design memory. Patient continues to demonstrate a cognitive communication impairment as well as dysarthric speech. His speech was informally evaluated this date- his overall intelligibility was approximate 75%- he is improving with his independent use of strategies, though it is still not all the time. He is also improving with self correction and repeating himself.     Patient would continue to benefit from skilled speech therapy services at this time to improve cognition, vision, orientation, executive functioning, and speech intelligibility.          Plan:   Continue with current plan of care.       Subjective   Patient was pleasant and  participated well throughout the session this date.       General Visit Information:   Certification Period Start Date: 08/18/23  Certification Period End Date: 10/16/23  Total Number of Visits : 12      Pain Assessment:   Pain Assessment: 0-10  Pain Score: 0 - No pain      Objective   By discharge LARY WOODALL will achieve the following goals:   Long Term Goals:  1. Patient will be able to adequately express himself in a variety of situations with a variety of individuals clearly and independently.      Short Term Goals:  1. Patient will be able to complete rapid alternating movements at 90% accuracy with min cues.   2. Patient will be clear in his speech at the conversational level of speech at 90% accuracy with min cues.   3. Patient will learn and utilize strategies to increase clarity of speech with return demo at 90%.  4. Patient will complete generative and convergent naming tasks with 80% accuracy and min-mod cues.   5. Patient will complete low to mid level cognitive tasks at 80% accuracy when provided with min to mod cues.   6. Patient/family education to be provided each session.     Outpatient Education:   Education was provided to pt/family and reviewed how to carryover strategies learned in session to home.

## 2023-10-06 NOTE — LETTER
October 6, 2023    Kwabena ZIMMERMAN DO  [address]  [facility] [address]    Patient: Duane Jin   YOB: 1955   Date of Visit: 10/6/2023       Dear No referring provider defined for this encounter.    The attached plan of care is being sent to you because your patient’s medical reimbursement requires that you certify the plan of care. Your signature is required to allow uninterrupted insurance coverage.      You may indicate your approval by signing below and faxing this form back to us at Dept Fax: [phone].    Please call Dept: [phone] with any questions or concerns.    Thank you for this referral,        VI Gross  [address] [address] [phone]    Payer: Payor: MEDICARE / Plan: MEDICARE PART A AND B / Product Type: *No Product type* /                                                                         Date:     Dear VI Gross,     Re: Mr. Isaiah Jin, MRN:[id number]    I certify that I have reviewed the attached plan of care and it is medically necessary for Mr. Isaiah Jin (1955) who is under my care.          ______________________________________                    _________________  Provider name and credentials                                           Date and time                                                                                      The following plan is in draft form.  Please refer to the current version for the most up-to-date information.                Plan of Care 8/18/23   Effective from: 8/18/2023  Effective to: 10/16/2023    Draft  Plan ID: [id number]               Participants as of 10/6/2023      Name Type Comments Contact Info    VI Gross Speech Language Pathologist  [phone]    [facility]wab[facility]IMMERMAN [facility] Attributed Provider  [phone]          Last Plan Note       Author: VI Gross  Status: Incomplete Last edited: 10/6/2023 10:30 AM         Speech-Language Pathology    Outpatient Speech-Language Pathology Treatment     Patient Name: Duane Jin  MRN: [id number]  Today's Date: 10/6/2023   Time Calculation  Start Time: 1030  Stop Time: 1115  Time Calculation (min): 45 min         Current Problem:   Patient Active Problem List   Diagnosis   • Acute recurrent pansinusitis   • Aortic stenosis   • Aphasia, post-stroke   • Apraxia   • Arthritis of knee, right   • Osteoarthritis of right knee   • 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   • CAD (coronary artery disease)   • Cardioembolic stroke (CMS/HCC)   • Cardiomyopathy, unspecified (CMS/HCC)   • Cellulitis of right lower extremity   • Cerebrovascular accident (CVA) (CMS/HCC)   • Cognitive communication deficit   • Common wart   • Syncope, convulsive   • Decreased coordination   • Decreased functional mobility   • Type 2 diabetes mellitus without complications (CMS/HCC)   • Dysarthria due to recent stroke   • Essential (primary) hypertension   • H/O: stroke with residual effects   • Need for hepatitis C screening test   • Screening for prostate cancer   • Routine general medical examination at health care facility   • Athscl heart disease of native coronary artery w/o ang pctrs   • Atherosclerotic heart disease of native coronary artery without angina pectoris   • Dysarthria and anarthria   • Nonrheumatic aortic (valve) stenosis   • Wound of right leg   • Visual field loss   • Urinary tract infection   • Thoracic aortic aneurysm (CMS/HCC)   • S/P CABG x 4   • S/P AVR (aortic valve replacement)   • S/P ascending aortic replacement   • Right knee pain   • Right hemisphere, cerebral infarction (CMS/HCC)   • Pneumonitis due to inhalation of food and vomit (CMS/HCC)   • 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, unspecified   • Mixed hyperlipidemia   • Hyperlipidemia, unspecified   • Mitral valve regurgitation   • Mild  protein-calorie malnutrition (CMS/HCC)   • Metabolic encephalopathy   • Lipoma of extremity   • Heart murmur previously undiagnosed   • Dysphagia following cerebral infarction   • Atelectasis   • Acute pulmonary edema (CMS/HCC)   • Acquired absence of other toe(s), unspecified side (CMS/HCC)   • Osteoarthritis   • Hypertensive heart disease with heart failure (CMS/HCC)   • Heart failure (CMS/HCC)   • Benign prostatic hyperplasia without lower urinary tract symptoms   • Hemiplegia and hemiparesis following cerebral infarction affecting left non-dominant side (CMS/HCC)   • Dependence on renal dialysis (CMS/HCC)   • Unspecified mood (affective) disorder (CMS/HCC)         SLP Assessment:   The Cognitive Linguistic Quick Test + (CLQT+) was administered this date.  The pt scored as follows:  Domains:  Attention: 105 (moderate deficit)   Memory: 136 (moderate deficit)   Executive Functions: 8 (severe deficit)  Language: 28 (mild deficit)  Visuospatial Skills: 38 (severe deficit)  Clock Drawin (mild deficit)     This indicates that the patient continues to demonstrate difficulty with visual based tasks- this is likely in part of a true vision deficit as well as cognitive deficits. He improved in the area of story recall and generative naming tasks. He also improved in the area of the clock drawing, when compared to his last evaluation. He demonstrated more difficulty with symbol cancellation and design memory. Patient continues to demonstrate a cognitive communication impairment as well as dysarthric speech. His speech was informally evaluated this date- his overall intelligibility was approximate 75%- he is improving with his independent use of strategies, though it is still not all the time. He is also improving with self correction and repeating himself.      Patient would continue to benefit from skilled speech therapy services at this time to improve cognition, vision, orientation, executive functioning, and  speech intelligibility.           Plan:   Continue with current plan of care.      Subjective  Patient was pleasant and participated well throughout the session this date.       General Visit Information:   Certification Period Start Date: 08/18/23  Certification Period End Date: 10/16/23  Total Number of Visits : 12      Pain Assessment:   Pain Assessment: 0-10  Pain Score: 0 - No pain      Objective  By discharge LARY WOODALL will achieve the following goals:   Long Term Goals:  1. Patient will be able to adequately express himself in a variety of situations with a variety of individuals clearly and independently.      Short Term Goals:  1. Patient will be able to complete rapid alternating movements at 90% accuracy with min cues.   2. Patient will be clear in his speech at the conversational level of speech at 90% accuracy with min cues.   3. Patient will learn and utilize strategies to increase clarity of speech with return demo at 90%.  4. Patient will complete generative and convergent naming tasks with 80% accuracy and min-mod cues.   5. Patient will complete low to mid level cognitive tasks at 80% accuracy when provided with min to mod cues.   6. Patient/family education to be provided each session.   Outpatient Education:   Education was provided to pt/family and reviewed how to carryover strategies learned in session to home.

## 2023-10-11 ENCOUNTER — TREATMENT (OUTPATIENT)
Dept: SPEECH THERAPY | Facility: CLINIC | Age: 68
End: 2023-10-11
Payer: MEDICARE

## 2023-10-11 DIAGNOSIS — R41.841 COGNITIVE COMMUNICATION DEFICIT: Primary | ICD-10-CM

## 2023-10-11 DIAGNOSIS — I69.322 DYSARTHRIA DUE TO RECENT STROKE: ICD-10-CM

## 2023-10-11 PROCEDURE — 92507 TX SP LANG VOICE COMM INDIV: CPT | Mod: GN,KX | Performed by: SPEECH-LANGUAGE PATHOLOGIST

## 2023-10-11 ASSESSMENT — PAIN - FUNCTIONAL ASSESSMENT: PAIN_FUNCTIONAL_ASSESSMENT: 0-10

## 2023-10-11 ASSESSMENT — PAIN SCALES - GENERAL: PAINLEVEL_OUTOF10: 0 - NO PAIN

## 2023-10-11 NOTE — PROGRESS NOTES
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    CAD (coronary artery disease)    Cardioembolic stroke (CMS/HCC)    Cardiomyopathy, unspecified (CMS/HCC)    Cellulitis of right lower extremity    Cerebrovascular accident (CVA) (CMS/HCC)    Cognitive communication deficit    Common wart    Syncope, convulsive    Decreased coordination    Decreased functional mobility    Type 2 diabetes mellitus without complications (CMS/HCC)    Dysarthria due to recent stroke    Essential (primary) hypertension    H/O: stroke with residual effects    Need for hepatitis C screening test    Screening for prostate cancer    Routine general medical examination at health care facility    Athscl heart disease of native coronary artery w/o ang pctrs    Atherosclerotic heart disease of native coronary artery without angina pectoris    Dysarthria and anarthria    Nonrheumatic aortic (valve) stenosis    Wound of right leg    Visual field loss    Urinary tract infection    Thoracic aortic aneurysm (CMS/HCC)    S/P CABG x 4    S/P AVR (aortic valve replacement)    S/P ascending aortic replacement    Right knee pain    Right hemisphere, cerebral infarction (CMS/HCC)    Pneumonitis due to inhalation of food and vomit (CMS/HCC)    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, unspecified    Mixed hyperlipidemia    Hyperlipidemia, unspecified    Mitral valve regurgitation    Mild protein-calorie malnutrition (CMS/HCC)    Metabolic encephalopathy    Lipoma of extremity    Heart murmur  previously undiagnosed    Dysphagia following cerebral infarction    Atelectasis    Acute pulmonary edema (CMS/HCC)    Acquired absence of other toe(s), unspecified side (CMS/HCC)    Osteoarthritis    Hypertensive heart disease with heart failure (CMS/HCC)    Heart failure (CMS/HCC)    Benign prostatic hyperplasia without lower urinary tract symptoms    Hemiplegia and hemiparesis following cerebral infarction affecting left non-dominant side (CMS/HCC)    Dependence on renal dialysis (CMS/HCC)    Unspecified mood (affective) disorder (CMS/HCC)         SLP Assessment:   Patient arrived to the session independently this date. He was able to complete mid level problem solving tasks with 30% accuracy independently, he required max possible cueing to complete. Patient completed reading tasks with 70% intelligibility- he read quickly and did not attempt to use compensatory strategies with SLP student. He was able to do so when provided with prompting.        Plan:   Continue with current plan of care.       Subjective   Patient was pleasant and participated well throughout the session this date.     General Visit Information:   Certification Period Start Date: 08/18/23  Certification Period End Date: 10/16/23  Total Number of Visits : 13      Pain Assessment:   Pain Assessment: 0-10  Pain Score: 0 - No pain      Objective   By discharge LARY WOODALL will achieve the following goals:   Long Term Goals:  1. Patient will be able to adequately express himself in a variety of situations with a variety of individuals clearly and independently.      Short Term Goals:  1. Patient will be able to complete rapid alternating movements at 90% accuracy with min cues.   2. Patient will be clear in his speech at the conversational level of speech at 90% accuracy with min cues.   3. Patient will learn and utilize strategies to increase clarity of speech with return demo at 90%.  4. Patient will complete generative and convergent  naming tasks with 80% accuracy and min-mod cues.   5. Patient will complete low to mid level cognitive tasks at 80% accuracy when provided with min to mod cues.   6. Patient/family education to be provided each session.     Outpatient Education:   Education was provided to pt/family and reviewed how to carryover strategies learned in session to home.

## 2023-10-13 ENCOUNTER — TREATMENT (OUTPATIENT)
Dept: SPEECH THERAPY | Facility: CLINIC | Age: 68
End: 2023-10-13
Payer: MEDICARE

## 2023-10-13 DIAGNOSIS — R41.841 COGNITIVE COMMUNICATION DEFICIT: Primary | ICD-10-CM

## 2023-10-13 DIAGNOSIS — I69.322 DYSARTHRIA DUE TO RECENT STROKE: ICD-10-CM

## 2023-10-13 PROCEDURE — 92507 TX SP LANG VOICE COMM INDIV: CPT | Mod: GN,KX | Performed by: SPEECH-LANGUAGE PATHOLOGIST

## 2023-10-13 ASSESSMENT — PAIN SCALES - GENERAL: PAINLEVEL_OUTOF10: 0 - NO PAIN

## 2023-10-13 ASSESSMENT — PAIN - FUNCTIONAL ASSESSMENT: PAIN_FUNCTIONAL_ASSESSMENT: 0-10

## 2023-10-13 NOTE — PROGRESS NOTES
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    CAD (coronary artery disease)    Cardioembolic stroke (CMS/HCC)    Cardiomyopathy, unspecified (CMS/HCC)    Cellulitis of right lower extremity    Cerebrovascular accident (CVA) (CMS/HCC)    Cognitive communication deficit    Common wart    Syncope, convulsive    Decreased coordination    Decreased functional mobility    Type 2 diabetes mellitus without complications (CMS/HCC)    Dysarthria due to recent stroke    Essential (primary) hypertension    H/O: stroke with residual effects    Need for hepatitis C screening test    Screening for prostate cancer    Routine general medical examination at health care facility    Athscl heart disease of native coronary artery w/o ang pctrs    Atherosclerotic heart disease of native coronary artery without angina pectoris    Dysarthria and anarthria    Nonrheumatic aortic (valve) stenosis    Wound of right leg    Visual field loss    Urinary tract infection    Thoracic aortic aneurysm (CMS/HCC)    S/P CABG x 4    S/P AVR (aortic valve replacement)    S/P ascending aortic replacement    Right knee pain    Right hemisphere, cerebral infarction (CMS/HCC)    Pneumonitis due to inhalation of food and vomit (CMS/HCC)    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, unspecified    Mixed hyperlipidemia    Hyperlipidemia, unspecified    Mitral valve regurgitation    Mild protein-calorie malnutrition (CMS/HCC)    Metabolic encephalopathy    Lipoma of extremity    Heart murmur  previously undiagnosed    Dysphagia following cerebral infarction    Atelectasis    Acute pulmonary edema (CMS/HCC)    Acquired absence of other toe(s), unspecified side (CMS/HCC)    Osteoarthritis    Hypertensive heart disease with heart failure (CMS/HCC)    Heart failure (CMS/HCC)    Benign prostatic hyperplasia without lower urinary tract symptoms    Hemiplegia and hemiparesis following cerebral infarction affecting left non-dominant side (CMS/HCC)    Dependence on renal dialysis (CMS/HCC)    Unspecified mood (affective) disorder (CMS/HCC)         SLP Assessment:   Patient arrived to the session with his daughter this date. Patient was able to complete reading passages from a magazine while independently utilizing slow speech and over articulation strategies in 80% of opportunities.  He was able to complete word finding tasks at 70% accuracy independently, increasing to 90% accuracy when provided with no more than one verbal or visual cue.       Plan:   Pt to continue with POC unless otherwise noted.         Subjective   Patient was pleasant and participated well throughout the session this date.     General Visit Information:   Certification Period Start Date: 08/18/23  Certification Period End Date: 10/16/23  Total Number of Visits : 14      Pain Assessment:   Pain Assessment: 0-10  Pain Score: 0 - No pain      Objective   By discharge LARY WOODALL will achieve the following goals:   Long Term Goals:  1. Patient will be able to adequately express himself in a variety of situations with a variety of individuals clearly and independently.      Short Term Goals:  1. Patient will be able to complete rapid alternating movements at 90% accuracy with min cues.   2. Patient will be clear in his speech at the conversational level of speech at 90% accuracy with min cues.   3. Patient will learn and utilize strategies to increase clarity of speech with return demo at 90%.  4. Patient will complete generative and  convergent naming tasks with 80% accuracy and min-mod cues.   5. Patient will complete low to mid level cognitive tasks at 80% accuracy when provided with min to mod cues.   6. Patient/family education to be provided each session.     Outpatient Education:   Education was provided to pt/family and reviewed how to carryover strategies learned in session to home.

## 2023-10-18 ENCOUNTER — TREATMENT (OUTPATIENT)
Dept: SPEECH THERAPY | Facility: CLINIC | Age: 68
End: 2023-10-18
Payer: MEDICARE

## 2023-10-18 DIAGNOSIS — R41.841 COGNITIVE COMMUNICATION DEFICIT: Primary | ICD-10-CM

## 2023-10-18 DIAGNOSIS — I69.322 DYSARTHRIA DUE TO RECENT STROKE: ICD-10-CM

## 2023-10-18 PROCEDURE — 92507 TX SP LANG VOICE COMM INDIV: CPT | Mod: GN,KX | Performed by: SPEECH-LANGUAGE PATHOLOGIST

## 2023-10-18 ASSESSMENT — PAIN - FUNCTIONAL ASSESSMENT: PAIN_FUNCTIONAL_ASSESSMENT: 0-10

## 2023-10-18 ASSESSMENT — PAIN SCALES - GENERAL: PAINLEVEL_OUTOF10: 0 - NO PAIN

## 2023-10-18 NOTE — PROGRESS NOTES
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    CAD (coronary artery disease)    Cardioembolic stroke (CMS/HCC)    Cardiomyopathy, unspecified (CMS/HCC)    Cellulitis of right lower extremity    Cerebrovascular accident (CVA) (CMS/HCC)    Cognitive communication deficit    Common wart    Syncope, convulsive    Decreased coordination    Decreased functional mobility    Type 2 diabetes mellitus without complications (CMS/HCC)    Dysarthria due to recent stroke    Essential (primary) hypertension    H/O: stroke with residual effects    Need for hepatitis C screening test    Screening for prostate cancer    Routine general medical examination at health care facility    Athscl heart disease of native coronary artery w/o ang pctrs    Atherosclerotic heart disease of native coronary artery without angina pectoris    Dysarthria and anarthria    Nonrheumatic aortic (valve) stenosis    Wound of right leg    Visual field loss    Urinary tract infection    Thoracic aortic aneurysm (CMS/HCC)    S/P CABG x 4    S/P AVR (aortic valve replacement)    S/P ascending aortic replacement    Right knee pain    Right hemisphere, cerebral infarction (CMS/HCC)    Pneumonitis due to inhalation of food and vomit (CMS/HCC)    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, unspecified    Mixed hyperlipidemia    Hyperlipidemia, unspecified    Mitral valve regurgitation    Mild protein-calorie malnutrition (CMS/HCC)    Metabolic encephalopathy    Lipoma of extremity    Heart murmur  previously undiagnosed    Dysphagia following cerebral infarction    Atelectasis    Acute pulmonary edema (CMS/HCC)    Acquired absence of other toe(s), unspecified side (CMS/HCC)    Osteoarthritis    Hypertensive heart disease with heart failure (CMS/HCC)    Heart failure (CMS/HCC)    Benign prostatic hyperplasia without lower urinary tract symptoms    Hemiplegia and hemiparesis following cerebral infarction affecting left non-dominant side (CMS/HCC)    Dependence on renal dialysis (CMS/HCC)    Unspecified mood (affective) disorder (CMS/HCC)         SLP Assessment:   Patient arrived to the session independently this date. He returned with word finding homework at 100% accuracy, with all answers on appropriate lines. Spelling was clearly difficult for him, though the words were recognizable.   Patient was able to complete deductive word finding tasks when provided with no more than three written clues with 65% accuracy independently. This increased to 80% accuracy when provided with auditory clues. Patient was able to use dysarthria compensatory speech strategies while reading in 70% of opportunities independently.  Patient was able to recall 3/4 digits after a 3 minute distraction/delay.       Plan:   Pt to continue with POC unless otherwise noted.       Subjective   Duane was pleasant and participated well throughout the session this date.     General Visit Information:   Certification Period Start Date: 08/18/23  Certification Period End Date: 10/16/23  Total Number of Visits : 15      Pain Assessment:   Pain Assessment: 0-10  Pain Score: 0 - No pain      Objective   By discharge LARY JACINTOKY will achieve the following goals:   Long Term Goals:  1. Patient will be able to adequately express himself in a variety of situations with a variety of individuals clearly and independently.      Short Term Goals:  1. Patient will be able to complete rapid alternating movements at 90% accuracy with min cues.   2.  Patient will be clear in his speech at the conversational level of speech at 90% accuracy with min cues.   3. Patient will learn and utilize strategies to increase clarity of speech with return demo at 90%.  4. Patient will complete generative and convergent naming tasks with 80% accuracy and min-mod cues.   5. Patient will complete low to mid level cognitive tasks at 80% accuracy when provided with min to mod cues.   6. Patient/family education to be provided each session.     Outpatient Education:   Education was provided to pt/family and reviewed how to carryover strategies learned in session to home.

## 2023-10-20 ENCOUNTER — TREATMENT (OUTPATIENT)
Dept: SPEECH THERAPY | Facility: CLINIC | Age: 68
End: 2023-10-20
Payer: MEDICARE

## 2023-10-20 DIAGNOSIS — I69.322 DYSARTHRIA DUE TO RECENT STROKE: ICD-10-CM

## 2023-10-20 DIAGNOSIS — R41.841 COGNITIVE COMMUNICATION DEFICIT: Primary | ICD-10-CM

## 2023-10-20 PROCEDURE — 92507 TX SP LANG VOICE COMM INDIV: CPT | Mod: GN,KX | Performed by: SPEECH-LANGUAGE PATHOLOGIST

## 2023-10-20 ASSESSMENT — PAIN SCALES - GENERAL: PAINLEVEL_OUTOF10: 0 - NO PAIN

## 2023-10-20 ASSESSMENT — PAIN - FUNCTIONAL ASSESSMENT: PAIN_FUNCTIONAL_ASSESSMENT: 0-10

## 2023-10-20 NOTE — PROGRESS NOTES
Speech-Language Pathology    Outpatient Speech-Language Pathology Treatment     Patient Name: Duane Jin  MRN: [id number]  Today's Date: 10/20/2023  Time Calculation  Start Time: 1030  Stop Time: 1110  Time Calculation (min): 40 min         Current Problem:   1. Cognitive communication deficit        2. Dysarthria due to recent stroke              SLP Assessment:   Patient was able to complete word finding tasks to abstract categories within a 17 minute time frame with 40% accuracy- increasing to 80% accuracy when provided with moderate to significant verbal cueing.  Patient completed oral reading tasks using speech compensatory strategies when provided with moderate verbal cueing in 70% of opportunities.        Plan:   Pt to continue with POC unless otherwise noted.       Subjective   Current Problem:  Patient arrived to the session this date independently. He was pleasant and participated well throughout the session.   General Visit Information:   Certification Period Start Date: 08/18/23  Certification Period End Date: 10/16/23  Total Number of Visits : 16      Pain Assessment:   Pain Assessment: 0-10  Pain Score: 0 - No pain      Objective   By discharge LARY JIN will achieve the following goals:   Long Term Goals:  1. Patient will be able to adequately express himself in a variety of situations with a variety of individuals clearly and independently.      Short Term Goals:  1. Patient will be able to complete rapid alternating movements at 90% accuracy with min cues.   2. Patient will be clear in his speech at the conversational level of speech at 90% accuracy with min cues.   3. Patient will learn and utilize strategies to increase clarity of speech with return demo at 90%.  4. Patient will complete generative and convergent naming tasks with 80% accuracy and min-mod cues.   5. Patient will complete low to mid level cognitive tasks at 80% accuracy when provided with min to mod cues.   6.  Patient/family education to be provided each session.     Outpatient Education:   Education was provided to pt/family and reviewed how to carryover strategies learned in session to home.

## 2023-10-25 ENCOUNTER — TREATMENT (OUTPATIENT)
Dept: SPEECH THERAPY | Facility: CLINIC | Age: 68
End: 2023-10-25
Payer: MEDICARE

## 2023-10-25 DIAGNOSIS — R41.841 COGNITIVE COMMUNICATION DEFICIT: Primary | ICD-10-CM

## 2023-10-25 DIAGNOSIS — I69.322 DYSARTHRIA DUE TO RECENT STROKE: ICD-10-CM

## 2023-10-25 PROCEDURE — 92507 TX SP LANG VOICE COMM INDIV: CPT | Mod: GN,KX | Performed by: SPEECH-LANGUAGE PATHOLOGIST

## 2023-10-25 ASSESSMENT — PAIN - FUNCTIONAL ASSESSMENT: PAIN_FUNCTIONAL_ASSESSMENT: 0-10

## 2023-10-25 ASSESSMENT — PAIN SCALES - GENERAL: PAINLEVEL_OUTOF10: 0 - NO PAIN

## 2023-10-25 NOTE — PROGRESS NOTES
Speech-Language Pathology    Outpatient Speech-Language Pathology Treatment     Patient Name: Duane Jin  MRN: [id number]  Today's Date: 10/25/2023  Time Calculation  Start Time: 0945  Stop Time: 1030  Time Calculation (min): 45 min         Current Problem:   1. Cognitive communication deficit        2. Dysarthria due to recent stroke              SLP Assessment:   Patient arrived independently to the session this date. He read paragraphs while using appropriate punctuation and inflection with 70% accuracy when provided with consistent cueing. He was approximately 75% intelligible while producing multi-syllabic words.   Patient was able to complete word finding tasks within a given time frame with 65% accuracy independently, increasing to 75% accuracy when provided with no more than a single verbal cue.        Plan:   Pt to continue with POC unless otherwise noted.         Subjective   Patient was pleasant and participated well throughout the session this date.       General Visit Information:   Certification Period Start Date: 08/18/23  Certification Period End Date: 10/16/23  Total Number of Visits : 17      Pain Assessment:   Pain Assessment: 0-10  Pain Score: 0 - No pain      Objective   By discharge LARY JIN will achieve the following goals:   Long Term Goals:  1. Patient will be able to adequately express himself in a variety of situations with a variety of individuals clearly and independently.      Short Term Goals:  1. Patient will be able to complete rapid alternating movements at 90% accuracy with min cues.   2. Patient will be clear in his speech at the conversational level of speech at 90% accuracy with min cues.   3. Patient will learn and utilize strategies to increase clarity of speech with return demo at 90%.  4. Patient will complete generative and convergent naming tasks with 80% accuracy and min-mod cues.   5. Patient will complete low to mid level cognitive tasks at 80% accuracy  when provided with min to mod cues.   6. Patient/family education to be provided each session.     Outpatient Education:   Education was provided to pt/family and reviewed how to carryover strategies learned in session to home.

## 2023-10-27 ENCOUNTER — TREATMENT (OUTPATIENT)
Dept: SPEECH THERAPY | Facility: CLINIC | Age: 68
End: 2023-10-27
Payer: MEDICARE

## 2023-10-27 DIAGNOSIS — I69.322 DYSARTHRIA DUE TO RECENT STROKE: ICD-10-CM

## 2023-10-27 DIAGNOSIS — R41.841 COGNITIVE COMMUNICATION DEFICIT: Primary | ICD-10-CM

## 2023-10-27 PROCEDURE — 92507 TX SP LANG VOICE COMM INDIV: CPT | Mod: GN,KX | Performed by: SPEECH-LANGUAGE PATHOLOGIST

## 2023-10-27 ASSESSMENT — PAIN SCALES - GENERAL: PAINLEVEL_OUTOF10: 0 - NO PAIN

## 2023-10-27 ASSESSMENT — PAIN - FUNCTIONAL ASSESSMENT: PAIN_FUNCTIONAL_ASSESSMENT: 0-10

## 2023-10-27 NOTE — PROGRESS NOTES
Speech-Language Pathology    Outpatient Speech-Language Pathology Treatment     Patient Name: Duane Jin  MRN: [id number]  Today's Date: 10/27/2023  Time Calculation  Start Time: 1030  Stop Time: 1110  Time Calculation (min): 40 min         Current Problem:   1. Cognitive communication deficit        2. Dysarthria due to recent stroke              SLP Assessment:   Patient arrived to the session independently this date. Patient was able to recall 1/4 digits after a 6 minute delay with distraction- he was able to complete word finding tasks within this 6 minute delay with 70% accuracy independently.   Patient was able to recall 0/3 digits after a 6 minute delay with distraction- he was able to complete word finding tasks within this 6 minute delay with 65% accuracy independently.   Provided education on over articulation and circumlocution strategies.        Plan:   Pt to continue with POC unless otherwise noted.       Subjective   Patient was pleasant and participated well throughout the therapy session this date.     General Visit Information:   Certification Period Start Date: 08/18/23  Certification Period End Date: 10/16/23  Total Number of Visits : 18      Pain Assessment:   Pain Assessment: 0-10  Pain Score: 0 - No pain      Objective   By discharge LARY JIN will achieve the following goals:   Long Term Goals:  1. Patient will be able to adequately express himself in a variety of situations with a variety of individuals clearly and independently.      Short Term Goals:  1. Patient will be able to complete rapid alternating movements at 90% accuracy with min cues.   2. Patient will be clear in his speech at the conversational level of speech at 90% accuracy with min cues.   3. Patient will learn and utilize strategies to increase clarity of speech with return demo at 90%.  4. Patient will complete generative and convergent naming tasks with 80% accuracy and min-mod cues.   5. Patient will complete  low to mid level cognitive tasks at 80% accuracy when provided with min to mod cues.   6. Patient/family education to be provided each session.     Outpatient Education:   Education was provided to pt/family and reviewed how to carryover strategies learned in session to home.

## 2023-11-01 ENCOUNTER — TELEMEDICINE CLINICAL SUPPORT (OUTPATIENT)
Dept: SPEECH THERAPY | Facility: CLINIC | Age: 68
End: 2023-11-01
Payer: MEDICARE

## 2023-11-01 DIAGNOSIS — R41.841 COGNITIVE COMMUNICATION DEFICIT: Primary | ICD-10-CM

## 2023-11-01 DIAGNOSIS — I69.322 DYSARTHRIA DUE TO RECENT STROKE: ICD-10-CM

## 2023-11-01 PROCEDURE — 92507 TX SP LANG VOICE COMM INDIV: CPT | Mod: GN,KX,95 | Performed by: SPEECH-LANGUAGE PATHOLOGIST

## 2023-11-01 ASSESSMENT — PAIN SCALES - GENERAL: PAINLEVEL_OUTOF10: 0 - NO PAIN

## 2023-11-01 ASSESSMENT — PAIN - FUNCTIONAL ASSESSMENT: PAIN_FUNCTIONAL_ASSESSMENT: 0-10

## 2023-11-01 NOTE — PROGRESS NOTES
Speech-Language Pathology    Outpatient Speech-Language Pathology Treatment     Patient Name: Duane Jin  MRN: [id number]  Today's Date: 11/1/2023  Time Calculation  Start Time: 0935  Stop Time: 1020  Time Calculation (min): 45 min       An interactive audio and video telecommunication system which permits real time communications between the patient (at the originating site) and provider (at the distant site) was utilized to provide this telehealth service.   Verbal consent was requested and obtained from patient on this date, 11/1/2023 for a telehealth visit.        Current Problem:   1. Cognitive communication deficit        2. Dysarthria due to recent stroke              SLP Assessment:   Patient arrived to the session with his wife, Karol. Patient was able to read various tongue twisters while using slow speech and over-articulation strategies in 80% of opportunities. He would occasionally stumble over a multisyllabic word and required cueing to repeat/restate and produce all sounds in a word in 80% of opportunities. Patient continually would skip numbers/sentences. Discussed encouraging patient to monitor and take notice of if a sentence makes sense or if he is out of numerical order.   Patient was able to generate words to a category in one minute- at most, he was able to independently produce 14 words within a defined category- though this category appeared to be of interest to the patient and therefore easier for him. Patient averaged 5 words/category independently in 1 minute.   Discussed going around to each room in the house and labeling as many items as possible in a 1 minute time frame. Patient/wife in agreement.        Plan:   Pt to continue with POC unless otherwise noted.       Subjective   Patient was pleasant and participated well throughout the session this date.     General Visit Information:   Certification Period Start Date: 08/18/23  Certification Period End Date: 10/16/23  Total Number  of Visits : 19      Pain Assessment:   Pain Assessment: 0-10  Pain Score: 0 - No pain      Objective   By discharge LARY WOODALL will achieve the following goals:   Long Term Goals:  1. Patient will be able to adequately express himself in a variety of situations with a variety of individuals clearly and independently.      Short Term Goals:  1. Patient will be able to complete rapid alternating movements at 90% accuracy with min cues.   2. Patient will be clear in his speech at the conversational level of speech at 90% accuracy with min cues.   3. Patient will learn and utilize strategies to increase clarity of speech with return demo at 90%.  4. Patient will complete generative and convergent naming tasks with 80% accuracy and min-mod cues.   5. Patient will complete low to mid level cognitive tasks at 80% accuracy when provided with min to mod cues.   6. Patient/family education to be provided each session.     Outpatient Education:   Education was provided to pt/family and reviewed how to carryover strategies learned in session to home.

## 2023-11-03 ENCOUNTER — TREATMENT (OUTPATIENT)
Dept: SPEECH THERAPY | Facility: CLINIC | Age: 68
End: 2023-11-03
Payer: MEDICARE

## 2023-11-03 DIAGNOSIS — I69.322 DYSARTHRIA DUE TO RECENT STROKE: ICD-10-CM

## 2023-11-03 DIAGNOSIS — R41.841 COGNITIVE COMMUNICATION DEFICIT: Primary | ICD-10-CM

## 2023-11-03 PROCEDURE — 92507 TX SP LANG VOICE COMM INDIV: CPT | Mod: GN,KX | Performed by: SPEECH-LANGUAGE PATHOLOGIST

## 2023-11-03 ASSESSMENT — PAIN - FUNCTIONAL ASSESSMENT: PAIN_FUNCTIONAL_ASSESSMENT: 0-10

## 2023-11-03 ASSESSMENT — PAIN SCALES - GENERAL: PAINLEVEL_OUTOF10: 0 - NO PAIN

## 2023-11-03 NOTE — PROGRESS NOTES
Speech-Language Pathology    Outpatient Speech-Language Pathology Treatment     Patient Name: Duane Jin  MRN: [id number]  Today's Date: 11/3/2023  Time Calculation  Start Time: 1030  Stop Time: 1110  Time Calculation (min): 40 min         Current Problem:   1. Cognitive communication deficit        2. Dysarthria due to recent stroke              SLP Assessment:   Patient arrived to the session independently. Patient was able to complete convergent naming tasks within a 2 minute window- he was able to name up to 16 words. This task was completed several times throughout the session- patient continues to require extra processing time for less common words/concepts, though his overall speed is increasing.   Patient was introduced to inference tasks- patient was able to read a situation and then infer information from it when provided with three answers to choose from with 20% accuracy independently. Provided remainder of task for homework.        Plan:   Pt to continue with POC unless otherwise noted.       Subjective   Patient was pleasant and participated well throughout the session this date.     General Visit Information:   Certification Period Start Date: 10/16/23  Certification Period End Date: 01/13/24  Total Number of Visits : 20      Pain Assessment:   Pain Assessment: 0-10  Pain Score: 0 - No pain      Objective   By discharge LARY JIN will achieve the following goals:   Long Term Goals:  1. Patient will be able to adequately express himself in a variety of situations with a variety of individuals clearly and independently.      Short Term Goals:  1. Patient will be able to complete rapid alternating movements at 90% accuracy with min cues.   2. Patient will be clear in his speech at the conversational level of speech at 90% accuracy with min cues.   3. Patient will learn and utilize strategies to increase clarity of speech with return demo at 90%.  4. Patient will complete generative and  convergent naming tasks with 80% accuracy and min-mod cues.   5. Patient will complete low to mid level cognitive tasks at 80% accuracy when provided with min to mod cues.   6. Patient/family education to be provided each session.     Outpatient Education:   Education was provided to pt/family and reviewed how to carryover strategies learned in session to home.

## 2023-11-08 ENCOUNTER — TREATMENT (OUTPATIENT)
Dept: SPEECH THERAPY | Facility: CLINIC | Age: 68
End: 2023-11-08
Payer: MEDICARE

## 2023-11-08 DIAGNOSIS — I69.322 DYSARTHRIA DUE TO RECENT STROKE: ICD-10-CM

## 2023-11-08 DIAGNOSIS — R41.841 COGNITIVE COMMUNICATION DEFICIT: Primary | ICD-10-CM

## 2023-11-08 PROCEDURE — 92507 TX SP LANG VOICE COMM INDIV: CPT | Mod: GN,KX | Performed by: SPEECH-LANGUAGE PATHOLOGIST

## 2023-11-08 ASSESSMENT — PAIN SCALES - GENERAL: PAINLEVEL_OUTOF10: 0 - NO PAIN

## 2023-11-08 ASSESSMENT — PAIN - FUNCTIONAL ASSESSMENT: PAIN_FUNCTIONAL_ASSESSMENT: 0-10

## 2023-11-08 NOTE — PROGRESS NOTES
Speech-Language Pathology    Outpatient Speech-Language Pathology Treatment     Patient Name: Duane Jin  MRN: [id number]  Today's Date: 11/8/2023  Time Calculation  Start Time: 0945  Stop Time: 1030  Time Calculation (min): 45 min         Current Problem:   1. Cognitive communication deficit        2. Dysarthria due to recent stroke              SLP Assessment:   Patient arrived independently to the session this date. Patient read paragraphs from a magazine- intelligibility started at approximately 90% but by the end of the reading was at approximately 60%. Discussed over articulation strategies again as well as tapping/tactile input during vocal output.   Patient completed tongue twisters combined with metronome interval training at 75 beats per minute in conjunction with this speech-language pathologist.   Patient was able to recall 3/3 digits after a 1 minute break.  Patient was able to generate up to 10 words to verbal definition within a one minute period.        Plan:   Pt to continue with POC unless otherwise noted.      Subjective   Current Problem:  Patient was pleasant and participated well throughout the session this date.   General Visit Information:   Certification Period Start Date: 10/16/23  Certification Period End Date: 01/13/24  Total Number of Visits : 21      Pain Assessment:   Pain Assessment: 0-10  Pain Score: 0 - No pain      Objective   By discharge LARY JIN will achieve the following goals:   Long Term Goals:  1. Patient will be able to adequately express himself in a variety of situations with a variety of individuals clearly and independently.      Short Term Goals:  1. Patient will be able to complete rapid alternating movements at 90% accuracy with min cues.   2. Patient will be clear in his speech at the conversational level of speech at 90% accuracy with min cues.   3. Patient will learn and utilize strategies to increase clarity of speech with return demo at 90%.  4.  Patient will complete generative and convergent naming tasks with 80% accuracy and min-mod cues.   5. Patient will complete low to mid level cognitive tasks at 80% accuracy when provided with min to mod cues.   6. Patient/family education to be provided each session.       Outpatient Education:   Education was provided to pt/family and reviewed how to carryover strategies learned in session to home.

## 2023-11-10 ENCOUNTER — TREATMENT (OUTPATIENT)
Dept: SPEECH THERAPY | Facility: CLINIC | Age: 68
End: 2023-11-10
Payer: MEDICARE

## 2023-11-10 DIAGNOSIS — R41.841 COGNITIVE COMMUNICATION DEFICIT: Primary | ICD-10-CM

## 2023-11-10 DIAGNOSIS — I69.322 DYSARTHRIA DUE TO RECENT STROKE: ICD-10-CM

## 2023-11-10 PROCEDURE — 92507 TX SP LANG VOICE COMM INDIV: CPT | Mod: GN,KX | Performed by: SPEECH-LANGUAGE PATHOLOGIST

## 2023-11-10 ASSESSMENT — PAIN SCALES - GENERAL: PAINLEVEL_OUTOF10: 0 - NO PAIN

## 2023-11-10 ASSESSMENT — PAIN - FUNCTIONAL ASSESSMENT: PAIN_FUNCTIONAL_ASSESSMENT: 0-10

## 2023-11-10 NOTE — PROGRESS NOTES
"Speech-Language Pathology    Outpatient Speech-Language Pathology Treatment     Patient Name: Isaiah Jin \"Arun"  MRN: [id number]  Today's Date: 11/10/2023  Time Calculation  Start Time: 1030  Stop Time: 1110  Time Calculation (min): 40 min         Current Problem:   1. Cognitive communication deficit        2. Dysarthria due to recent stroke              SLP Assessment:   Patient arrived to the session independently. He returned with abstract word finding homework at 100% accuracy and reports completing tongue twisters in the home setting.   Patient read passages from magazines with 70% clarity.   Patient completed inferencing tasks at 80% accuracy when provided with minimal assist.        Plan:   Pt to continue with POC unless otherwise noted.       Subjective   Patient was pleasant and participated well throughout the session this date.       General Visit Information:   Certification Period Start Date: 10/16/23  Certification Period End Date: 01/13/24  Total Number of Visits : 22      Pain Assessment:   Pain Assessment: 0-10  Pain Score: 0 - No pain      Objective   By discharge ISAIAH JIN will achieve the following goals:   Long Term Goals:  1. Patient will be able to adequately express himself in a variety of situations with a variety of individuals clearly and independently.      Short Term Goals:  1. Patient will be able to complete rapid alternating movements at 90% accuracy with min cues.   2. Patient will be clear in his speech at the conversational level of speech at 90% accuracy with min cues.   3. Patient will learn and utilize strategies to increase clarity of speech with return demo at 90%.  4. Patient will complete generative and convergent naming tasks with 80% accuracy and min-mod cues.   5. Patient will complete low to mid level cognitive tasks at 80% accuracy when provided with min to mod cues.   6. Patient/family education to be provided each session.     Outpatient Education:   " Education was provided to pt/family and reviewed how to carryover strategies learned in session to home.

## 2023-11-11 DIAGNOSIS — I69.398 OTHER SEQUELAE OF CEREBRAL INFARCTION: ICD-10-CM

## 2023-11-11 DIAGNOSIS — F91.9 CONDUCT DISORDER, UNSPECIFIED: ICD-10-CM

## 2023-11-14 RX ORDER — QUETIAPINE FUMARATE 25 MG/1
25 TABLET, FILM COATED ORAL NIGHTLY
Qty: 90 TABLET | Refills: 3 | OUTPATIENT
Start: 2023-11-14

## 2023-11-15 ENCOUNTER — TREATMENT (OUTPATIENT)
Dept: SPEECH THERAPY | Facility: CLINIC | Age: 68
End: 2023-11-15
Payer: MEDICARE

## 2023-11-15 DIAGNOSIS — R41.841 COGNITIVE COMMUNICATION DEFICIT: Primary | ICD-10-CM

## 2023-11-15 DIAGNOSIS — I69.322 DYSARTHRIA DUE TO RECENT STROKE: ICD-10-CM

## 2023-11-15 PROCEDURE — 92507 TX SP LANG VOICE COMM INDIV: CPT | Mod: GN,KX | Performed by: SPEECH-LANGUAGE PATHOLOGIST

## 2023-11-15 ASSESSMENT — PAIN SCALES - GENERAL: PAINLEVEL_OUTOF10: 0 - NO PAIN

## 2023-11-15 ASSESSMENT — PAIN - FUNCTIONAL ASSESSMENT: PAIN_FUNCTIONAL_ASSESSMENT: 0-10

## 2023-11-15 NOTE — PROGRESS NOTES
"Speech-Language Pathology    Patient Name: Isaiah Jin \"Duane\"  MRN: [id number]  : 1955  Today's Date: 11/15/23  Time Calculation  Start Time: 945  Stop Time: 1030  Time Calculation (min): 45 min        Current Problem:  1. Cognitive communication deficit          2. Dysarthria due to recent stroke            SLP Assessment  Patient arrived to the session- per patient and wife report via email, patient daughter is currently in the hospital and going through complex treatment.   Patient was able to read through scripted Thanksgiving phrases at 95% intelligibility this date.   Patient was able to complete word finding tasks (Scattergories) within a defined 3 minute time limit at 20% accuracy. Patient significantly struggled with this task- when the time limit was up and answers were discussed, patient was able to generate words to categories with 60% accuracy.     Plan  Continue with POC.     Subjective  Isaiah Jin \"Duane\" was pleasant and participated well throughout the session this date.     General Visit Info  Certification Period Start Date: 10/16/23 Certification Period End Date: 24           Total Number of Visits : 23     Insurance Reviewed this date:  yes     Pain  Pain Assessment: Pain Assessment: 0-10  Pain Score: Pain Score: 0 - No pain    Objective  By discharge ISAIAH JIN will achieve the following goals:   Long Term Goals:  1. Patient will be able to adequately express himself in a variety of situations with a variety of individuals clearly and independently.      Short Term Goals:  1. Patient will be able to complete rapid alternating movements at 90% accuracy with min cues.   2. Patient will be clear in his speech at the conversational level of speech at 90% accuracy with min cues.   3. Patient will learn and utilize strategies to increase clarity of speech with return demo at 90%.  4. Patient will complete generative and convergent naming tasks with 80% accuracy " and min-mod cues.   5. Patient will complete low to mid level cognitive tasks at 80% accuracy when provided with min to mod cues.   6. Patient/family education to be provided each session.     Education  Education was provided to pt/family and reviewed how to carryover strategies learned in session to home.

## 2023-11-17 ENCOUNTER — TREATMENT (OUTPATIENT)
Dept: SPEECH THERAPY | Facility: CLINIC | Age: 68
End: 2023-11-17
Payer: MEDICARE

## 2023-11-17 DIAGNOSIS — R41.841 COGNITIVE COMMUNICATION DEFICIT: Primary | ICD-10-CM

## 2023-11-17 DIAGNOSIS — I69.322 DYSARTHRIA DUE TO RECENT STROKE: ICD-10-CM

## 2023-11-17 PROCEDURE — 92507 TX SP LANG VOICE COMM INDIV: CPT | Mod: GN,KX | Performed by: SPEECH-LANGUAGE PATHOLOGIST

## 2023-11-20 ASSESSMENT — PAIN - FUNCTIONAL ASSESSMENT: PAIN_FUNCTIONAL_ASSESSMENT: 0-10

## 2023-11-20 ASSESSMENT — PAIN SCALES - GENERAL: PAINLEVEL_OUTOF10: 0 - NO PAIN

## 2023-11-20 NOTE — PROGRESS NOTES
"Speech-Language Pathology Treatment/Discharge Summary    Patient Name: Isaiah Jin \"Arun"  MRN: [id number]  : 1955  Today's Date: 23  Time Calculation  Start Time: 1030  Stop Time: 1115  Time Calculation (min): 45 min        Current Problem:  1. Cognitive communication deficit          2. Dysarthria due to recent stroke              SLP Assessment  Patient in good spirits upon arrival to therapy session. Patient able to complete reading tasks with phrases pertaining to Thanksgiving with 95% intelligibility. Patient able to complete word finding tasks when provided with a 3 minute time limit and ticking timer with 25% accuracy independently. Reviewed word finding strategies as well as over articulation strategies at length.   Spoke with patient's wife prior to session- due to health concerns for other family members and shift in priorities, speech therapy services to discharge at this time. Family to contact the office after the holidays if further speech therapy is desired. All in agreement of this plan.     Plan  Continue with POC.     Subjective  Isaiah Jin \"Arun" was pleasant and participated well throughout the therapy session this date.     General Visit Info  Certification Period Start Date: 10/16/23 Certification Period End Date: 24           Total Number of Visits : 24     Insurance Reviewed this date:  yes     Pain  Pain Assessment: Pain Assessment: 0-10  Pain Score: Pain Score: 0 - No pain    Objective  By discharge ISAIAH JIN will achieve the following goals:   Long Term Goals:  1. Patient will be able to adequately express himself in a variety of situations with a variety of individuals clearly and independently.  DISCHARGED 23     Short Term Goals:  1. Patient will be able to complete rapid alternating movements at 90% accuracy with min cues. DISCHARGED 23  2. Patient will be clear in his speech at the conversational level of speech at 90% accuracy " with min cues. DISCHARGED 11/17/23  3. Patient will learn and utilize strategies to increase clarity of speech with return demo at 90%.DISCHARGED 11/17/23  4. Patient will complete generative and convergent naming tasks with 80% accuracy and min-mod cues. DISCHARGED 11/17/23  5. Patient will complete low to mid level cognitive tasks at 80% accuracy when provided with min to mod cues. DISCHARGED 11/17/23  6. Patient/family education to be provided each session. DISCHARGED 11/17/23    Education  Education was provided to pt/family and reviewed how to carryover strategies learned in session to home.

## 2023-11-21 PROBLEM — E78.2 MIXED HYPERLIPIDEMIA: Chronic | Status: ACTIVE | Noted: 2023-08-23

## 2023-11-21 PROBLEM — I25.10 ATHEROSCLEROTIC HEART DISEASE OF NATIVE CORONARY ARTERY WITHOUT ANGINA PECTORIS: Status: RESOLVED | Noted: 2022-09-11 | Resolved: 2023-11-21

## 2023-11-21 PROBLEM — I11.0 HYPERTENSIVE HEART DISEASE WITH HEART FAILURE (MULTI): Status: RESOLVED | Noted: 2022-09-27 | Resolved: 2023-11-21

## 2023-11-21 PROBLEM — I25.10 ATHSCL HEART DISEASE OF NATIVE CORONARY ARTERY W/O ANG PCTRS: Status: RESOLVED | Noted: 2022-09-27 | Resolved: 2023-11-21

## 2023-11-21 PROBLEM — I25.10 CAD (CORONARY ARTERY DISEASE): Chronic | Status: ACTIVE | Noted: 2023-05-17

## 2023-11-21 PROBLEM — I71.20 THORACIC AORTIC ANEURYSM (CMS-HCC): Chronic | Status: ACTIVE | Noted: 2023-08-23

## 2023-11-21 PROBLEM — I42.9 CARDIOMYOPATHY, UNSPECIFIED (MULTI): Chronic | Status: ACTIVE | Noted: 2022-09-11

## 2023-11-21 PROBLEM — F39 UNSPECIFIED MOOD (AFFECTIVE) DISORDER (CMS-HCC): Status: RESOLVED | Noted: 2023-08-23 | Resolved: 2023-11-21

## 2023-11-21 PROBLEM — I35.0 AORTIC STENOSIS: Chronic | Status: ACTIVE | Noted: 2023-05-17

## 2023-11-21 PROBLEM — I34.0 MITRAL VALVE REGURGITATION: Chronic | Status: ACTIVE | Noted: 2023-08-23

## 2023-11-21 PROBLEM — I63.9 CEREBROVASCULAR ACCIDENT (CVA) (MULTI): Chronic | Status: ACTIVE | Noted: 2023-05-17

## 2023-11-22 ENCOUNTER — APPOINTMENT (OUTPATIENT)
Dept: SPEECH THERAPY | Facility: CLINIC | Age: 68
End: 2023-11-22
Payer: MEDICARE

## 2023-11-27 PROBLEM — I35.0 NONRHEUMATIC AORTIC (VALVE) STENOSIS: Status: RESOLVED | Noted: 2022-09-11 | Resolved: 2023-11-27

## 2023-11-27 PROBLEM — M17.11 ARTHRITIS OF KNEE, RIGHT: Status: RESOLVED | Noted: 2023-05-17 | Resolved: 2023-11-27

## 2023-11-27 PROBLEM — J01.41 ACUTE RECURRENT PANSINUSITIS: Status: RESOLVED | Noted: 2023-05-17 | Resolved: 2023-11-27

## 2023-11-27 PROBLEM — R01.1 HEART MURMUR PREVIOUSLY UNDIAGNOSED: Status: RESOLVED | Noted: 2023-08-23 | Resolved: 2023-11-27

## 2023-11-27 PROBLEM — J69.0 PNEUMONITIS DUE TO INHALATION OF FOOD AND VOMIT (MULTI): Status: RESOLVED | Noted: 2022-09-11 | Resolved: 2023-11-27

## 2023-11-27 PROBLEM — M17.11 OSTEOARTHRITIS OF RIGHT KNEE: Status: RESOLVED | Noted: 2023-05-17 | Resolved: 2023-11-27

## 2023-11-27 PROBLEM — Z11.59 NEED FOR HEPATITIS C SCREENING TEST: Status: RESOLVED | Noted: 2023-05-25 | Resolved: 2023-11-27

## 2023-11-27 PROBLEM — J98.11 ATELECTASIS: Status: RESOLVED | Noted: 2022-09-11 | Resolved: 2023-11-27

## 2023-11-27 PROBLEM — Z89.429 ACQUIRED ABSENCE OF OTHER TOE(S), UNSPECIFIED SIDE (MULTI): Status: RESOLVED | Noted: 2022-09-11 | Resolved: 2023-11-27

## 2023-11-27 PROBLEM — D17.20 LIPOMA OF EXTREMITY: Status: RESOLVED | Noted: 2023-08-23 | Resolved: 2023-11-27

## 2023-11-27 PROBLEM — L03.115 CELLULITIS OF RIGHT LOWER EXTREMITY: Status: RESOLVED | Noted: 2023-05-17 | Resolved: 2023-11-27

## 2023-11-27 PROBLEM — R26.89 DECREASED FUNCTIONAL MOBILITY: Status: RESOLVED | Noted: 2023-05-17 | Resolved: 2023-11-27

## 2023-11-27 PROBLEM — Z00.00 ROUTINE GENERAL MEDICAL EXAMINATION AT HEALTH CARE FACILITY: Status: RESOLVED | Noted: 2023-05-25 | Resolved: 2023-11-27

## 2023-11-27 PROBLEM — E11.9 TYPE 2 DIABETES MELLITUS WITHOUT COMPLICATIONS (MULTI): Chronic | Status: ACTIVE | Noted: 2022-09-11

## 2023-11-27 PROBLEM — N39.0 URINARY TRACT INFECTION: Status: RESOLVED | Noted: 2023-08-23 | Resolved: 2023-11-27

## 2023-11-27 PROBLEM — I10 ESSENTIAL (PRIMARY) HYPERTENSION: Chronic | Status: ACTIVE | Noted: 2022-09-11

## 2023-11-27 PROBLEM — B07.8 COMMON WART: Status: RESOLVED | Noted: 2023-05-17 | Resolved: 2023-11-27

## 2023-11-27 PROBLEM — I63.9: Chronic | Status: ACTIVE | Noted: 2022-09-11

## 2023-11-27 PROBLEM — M19.90 OSTEOARTHRITIS: Status: RESOLVED | Noted: 2022-09-27 | Resolved: 2023-11-27

## 2023-11-27 PROBLEM — E44.1 MILD PROTEIN-CALORIE MALNUTRITION (MULTI): Status: RESOLVED | Noted: 2022-09-11 | Resolved: 2023-11-27

## 2023-11-27 PROBLEM — Z95.2 S/P AVR (AORTIC VALVE REPLACEMENT): Chronic | Status: ACTIVE | Noted: 2023-08-23

## 2023-11-28 ENCOUNTER — OFFICE VISIT (OUTPATIENT)
Dept: CARDIOLOGY | Facility: CLINIC | Age: 68
End: 2023-11-28
Payer: MEDICARE

## 2023-11-28 VITALS
OXYGEN SATURATION: 96 % | HEART RATE: 89 BPM | SYSTOLIC BLOOD PRESSURE: 128 MMHG | DIASTOLIC BLOOD PRESSURE: 72 MMHG | BODY MASS INDEX: 27.67 KG/M2 | WEIGHT: 182 LBS

## 2023-11-28 DIAGNOSIS — I34.0 NONRHEUMATIC MITRAL VALVE REGURGITATION: Chronic | ICD-10-CM

## 2023-11-28 DIAGNOSIS — I25.10 CORONARY ARTERY DISEASE INVOLVING NATIVE HEART WITHOUT ANGINA PECTORIS, UNSPECIFIED VESSEL OR LESION TYPE: Chronic | ICD-10-CM

## 2023-11-28 DIAGNOSIS — I71.21 ANEURYSM OF ASCENDING AORTA WITHOUT RUPTURE (CMS-HCC): Primary | Chronic | ICD-10-CM

## 2023-11-28 DIAGNOSIS — I10 ESSENTIAL (PRIMARY) HYPERTENSION: Chronic | ICD-10-CM

## 2023-11-28 DIAGNOSIS — I63.119 CEREBROVASCULAR ACCIDENT (CVA) DUE TO EMBOLISM OF VERTEBRAL ARTERY, UNSPECIFIED BLOOD VESSEL LATERALITY (MULTI): Chronic | ICD-10-CM

## 2023-11-28 DIAGNOSIS — E78.2 MIXED HYPERLIPIDEMIA: Chronic | ICD-10-CM

## 2023-11-28 DIAGNOSIS — I25.5 ISCHEMIC CARDIOMYOPATHY: Chronic | ICD-10-CM

## 2023-11-28 DIAGNOSIS — Z95.2 S/P AVR (AORTIC VALVE REPLACEMENT): Chronic | ICD-10-CM

## 2023-11-28 PROCEDURE — 3074F SYST BP LT 130 MM HG: CPT | Performed by: INTERNAL MEDICINE

## 2023-11-28 PROCEDURE — 1036F TOBACCO NON-USER: CPT | Performed by: INTERNAL MEDICINE

## 2023-11-28 PROCEDURE — 3052F HG A1C>EQUAL 8.0%<EQUAL 9.0%: CPT | Performed by: INTERNAL MEDICINE

## 2023-11-28 PROCEDURE — 93000 ELECTROCARDIOGRAM COMPLETE: CPT | Performed by: INTERNAL MEDICINE

## 2023-11-28 PROCEDURE — 4010F ACE/ARB THERAPY RXD/TAKEN: CPT | Performed by: INTERNAL MEDICINE

## 2023-11-28 PROCEDURE — 1160F RVW MEDS BY RX/DR IN RCRD: CPT | Performed by: INTERNAL MEDICINE

## 2023-11-28 PROCEDURE — 3078F DIAST BP <80 MM HG: CPT | Performed by: INTERNAL MEDICINE

## 2023-11-28 PROCEDURE — 1159F MED LIST DOCD IN RCRD: CPT | Performed by: INTERNAL MEDICINE

## 2023-11-28 PROCEDURE — 2028F FOOT EXAM PERFORMED: CPT | Performed by: INTERNAL MEDICINE

## 2023-11-28 PROCEDURE — 1126F AMNT PAIN NOTED NONE PRSNT: CPT | Performed by: INTERNAL MEDICINE

## 2023-11-28 PROCEDURE — 99214 OFFICE O/P EST MOD 30 MIN: CPT | Performed by: INTERNAL MEDICINE

## 2023-11-28 PROCEDURE — 1158F ADVNC CARE PLAN TLK DOCD: CPT | Performed by: INTERNAL MEDICINE

## 2023-11-28 NOTE — PROGRESS NOTES
Referred by No ref. provider found    HPI Here for 6-month follow-up.  Feeling great.  No chest pain or pressure no shortness of breath no palpitations.  Doing all her daily activities of living    Past Medical History:  Problem List Items Addressed This Visit    None       Past Medical History:   Diagnosis Date    Aortic stenosis 05/17/2023    Bicuspid AV moderate, s/p Large Tereso AVR 12/2020    CAD (coronary artery disease) 05/17/2023    Elevated CAC @ 1353, s/p CABG 12/2020 with OIMA-LAD, SVG-PDA, OM1 Y graft to OM2    Cardiomyopathy, unspecified (CMS/[facility]) 09/11/2022    EF 35-40%, now 40-45%    Cerebrovascular accident (CVA) (CMS/[facility]) 05/17/2023    Post OHS 12/2020 with left-sided visual deficit, recurrent CVA 8/2022. ILANA negative. Now on Eliquis and Plavix.    Mitral valve regurgitation 08/23/2023    Mild to moderate    Mixed hyperlipidemia 08/23/2023    Dr. Gomez following    Other specified health status     No known health problems    Thoracic aortic aneurysm (CMS/[facility]) 08/23/2023    4.6 cm s/p #32 Gelweave aortic graft    Unspecified sequelae of cerebral infarction 07/23/2021    H/O: stroke with residual effects             Past Surgical History:  He has a past surgical history that includes Other surgical history (02/01/2021); CT angio head w and wo IV contrast (1/5/2021); CT angio neck (1/5/2021); CT angio head w and wo IV contrast (8/18/2022); and CT angio neck (8/18/2022).      Social History:  He reports that he has never smoked. He has never used smokeless tobacco. He reports that he does not drink alcohol and does not use drugs.    Family History:  Family History   Problem Relation Name Age of Onset    Coronary artery disease Father      No Known Problems Sibling          Allergies:  Patient has no known allergies.    Outpatient Medications:  Current Outpatient Medications   Medication Instructions    atorvastatin (Lipitor) 80 mg tablet 1 tablet, oral, Daily    clopidogrel (Plavix) 75 mg tablet  "TAKE 1 TABLET BY MOUTH EVERY DAY    cyanocobalamin (Vitamin B-12) 1,000 mcg tablet oral    fenofibrate (Triglide) 160 mg tablet TAKE 1 TABLET BY MOUTH EVERY DAY    furosemide (Lasix) 40 mg tablet 1 tablet, oral, Daily    Lantus Solostar U-100 Insulin 10 Units, subcutaneous, Nightly    losartan (Cozaar) 25 mg tablet 1 tablet, oral, Daily    metFORMIN XR (Glucophage-XR) 500 mg 24 hr tablet 1 tablet, oral, Daily    metoprolol succinate XL (Toprol-XL) 50 mg 24 hr tablet 1 tablet, oral, Daily    multivitamin with minerals (multivit-min-iron fum-folic ac) tablet oral    OneTouch Delica Plus Lancet 33 gauge misc TEST ONCE DAILY AS DIRECTED    OneTouch Verio test strips strip TEST EVERY DAY    pen needle, diabetic (BD Ultra-Fine Kerry Pen Needle) 32 gauge x 5/32\" needle AS DIRECTED    QUEtiapine (SEROquel) 25 mg tablet 1 tablet, oral, Nightly    tamsulosin (Flomax) 0.4 mg 24 hr capsule TAKE 1 CAPSULE BY MOUTH EVERYDAY AT BEDTIME    Xarelto 20 mg tablet 1 tablet, oral, Nightly        Last Recorded Vitals:  There were no vitals filed for this visit.    Physical Exam    Physical  Patient is alert and oriented x3.  HEENT is unremarkable mucous members are moist  Neck no JVP no bruits upstrokes are full no thyromegaly  Lungs are clear bilaterally.  No wheezing crackles or rales  Heart regular rhythm normal S1-S2 there is no S3 1-2/6 ANDRES  Abdomen is soft vessels are positive nontender nondistended no organomegaly no pulsatile masses  Extremities have no edema.  Distal pulses present palpable.  Neuro is grossly nonfocal  Skin has no rashes     Last Labs:  CBC -  Lab Results   Component Value Date    WBC 7.6 05/26/2023    HGB 13.1 (L) 05/26/2023    HCT 39.8 (L) 05/26/2023    MCV 90 05/26/2023     05/26/2023       CMP -  Lab Results   Component Value Date    CALCIUM 9.7 05/26/2023    PHOS 2.9 09/10/2022    PROT 7.1 05/26/2023    ALBUMIN 4.5 05/26/2023    AST 15 05/26/2023    ALT 18 05/26/2023    ALKPHOS 59 05/26/2023    " BILITOT 0.5 05/26/2023       LIPID PANEL -   Lab Results   Component Value Date    CHOL 133 05/26/2023    HDL 28.6 (A) 05/26/2023    CHHDL 4.7 05/26/2023    VLDL 76 (H) 05/26/2023    TRIG 380 (H) 05/26/2023    NHDL 104 05/26/2023       RENAL FUNCTION PANEL -   Lab Results   Component Value Date    K 4.3 05/26/2023    PHOS 2.9 09/10/2022       Lab Results   Component Value Date    BNP 39 08/18/2022    HGBA1C 8.7 (A) 08/23/2023    HGBA1C 7.6 12/03/2021     Procedure  Echo 8/2/23 EF 40%, HK apex, AVR 20/10mmHg    ILANA [09/08/2022]: EF 40-45%. Multiple WMAs. Distal anterior / anteroapical / inferoapical walls are hypokinetic (unchanged from prior study). No LV thrombus vis'd. Mod cLVH. Bioprosthetic AV. No ev/of PFO. No LA mass / thrombus. Plaque vis'd in transverse & descending aorta. No valvular vegetations.      EVENT MONITOR [08/19/2022 - 09/01/2022]: SR w/avg HR 71. No ep/of A-fib / PSVT / high-grade AV block. One ep/of nonsust VT.      ECHO [08/19/2022]: LVSF was not assessed.      ECHO [07/27/2022]: EF 40-45%. Apical septal segment, apical anterior segment and apex are abnormal. Abnormal septal motion c/w postop status. SD = impaired relaxation pattern. Mod cLVH. Bioprosthetic AV. Global hypok.      EVENT MONITOR [08/25/2021 â€“ 09/23/2021]: SR, avg HR 68. No ep/of A-fib / PSVT / high-grade AV block. One ep/of nonsust VT.     CABG [01/04/2021, Dr. Stu Álvarez]: x4 = LIMA to LAD, SVG to PDA, SVG to first circumflex and Y to second circumflex. Aortic valve replacement with Perceval Large. Ascending aorta replacement with Gelweave graft 32 mm.     CATH [12/14/2020, Dr. Anuj Ham]: LAD occluded, 85% ramus intermedius, occluded second marginal, ostial 85% stenosis of the RCA, 75 to 80% proximal PDA and posterior lateral branch stenosis, normal right heart filling pressures     PHARM NST [11/18/2020]: Abnormal = ev/for prior distal septal and apical wall MI w/o residual jose-infarct ischemia. WMAs w/severely  reduced calculated EF of 30%.      CT / SCORING [11/16/2020] = 1353 (LM 53.5, .2, LCx 243.7, .6). Aneurysmal dilatation of ascending thoracic aorta (4.6 cm).          Assessment/Plan   1. CAD. Bypass December 2020 LIMA to the LAD SVG to the PDA OM1 with a Y graft to the OM 2. At the same time he had an AVR and aortic root replacement. Doing well. Post-op CVA. Doing great. Continue Plavix. No symptoms of angina.  Physically remains active although is not exercising.     2. Aortic valve replacement.  This was done due to a bicuspid aortic valve.  He had a large Perceval bioprosthetic aortic valve replacement.  On a recent echo which I personally reviewed done at [facility] the valve is well-seated within acceptable gradient    3. Thoracic aortic aneurysm measuring 4.6 cm. At the time of surgery was noted to be 5.2 cm. Status post replacement with a #32 Gelweave graft. There is mild atherosclerotic disease on the ILANA.     4. Cardiomyopathy. Preoperative ejection fraction 35%. Postoperative ejection fraction 40 to 45%. On his repeat echo, the ejection fraction is once again 40 to 45%. There is severe hypokinesis to akinesis of the anterior wall consistent with his known totally occluded LAD. These findings were confirmed on the ILANA September 2022.  This was found on his echo from September 2023.  No volume overload.  Continue with Lasix 40 mg daily, metoprolol succinate.     5. CVA-Post-op from OHS. He subsequently had a repeat CVA September 2022. The etiology was not clear. He is being evaluated by stroke neurology. A ILANA did not reveal a clear etiology though he did have an anteroapical wall motion abnormality which certainly put him at higher risk of mural thrombi.  He remains on Xarelto and clopidogrel.  No bleeding complications.    6. Hyperlipidemia. Followed with his primary care doctor. 5/2023 LDL 28, HDL 29, Trig 380.  Exercise will help with the triglycerides     7. Hypertension.  Blood  pressures are excellent today    EKG today.  Return in 6 months    Anuj Ham MD     Instructions and follow up

## 2023-11-29 ENCOUNTER — APPOINTMENT (OUTPATIENT)
Dept: SPEECH THERAPY | Facility: CLINIC | Age: 68
End: 2023-11-29
Payer: MEDICARE

## 2023-12-01 ENCOUNTER — APPOINTMENT (OUTPATIENT)
Dept: SPEECH THERAPY | Facility: CLINIC | Age: 68
End: 2023-12-01
Payer: MEDICARE

## 2023-12-06 ENCOUNTER — OFFICE VISIT (OUTPATIENT)
Dept: PRIMARY CARE | Facility: CLINIC | Age: 68
End: 2023-12-06
Payer: MEDICARE

## 2023-12-06 ENCOUNTER — LAB (OUTPATIENT)
Dept: LAB | Facility: LAB | Age: 68
End: 2023-12-06
Payer: MEDICARE

## 2023-12-06 VITALS
OXYGEN SATURATION: 96 % | DIASTOLIC BLOOD PRESSURE: 72 MMHG | HEART RATE: 56 BPM | WEIGHT: 182 LBS | HEIGHT: 67 IN | TEMPERATURE: 97.1 F | BODY MASS INDEX: 28.56 KG/M2 | SYSTOLIC BLOOD PRESSURE: 138 MMHG

## 2023-12-06 DIAGNOSIS — I42.9 CARDIOMYOPATHY, UNSPECIFIED (MULTI): ICD-10-CM

## 2023-12-06 DIAGNOSIS — Z79.4 TYPE 2 DIABETES MELLITUS WITHOUT COMPLICATION, WITH LONG-TERM CURRENT USE OF INSULIN (MULTI): Primary | Chronic | ICD-10-CM

## 2023-12-06 DIAGNOSIS — R56.9 CONVULSIONS, UNSPECIFIED CONVULSION TYPE (MULTI): ICD-10-CM

## 2023-12-06 DIAGNOSIS — F51.01 PRIMARY INSOMNIA: ICD-10-CM

## 2023-12-06 DIAGNOSIS — E11.9 TYPE 2 DIABETES MELLITUS WITHOUT COMPLICATION, WITH LONG-TERM CURRENT USE OF INSULIN (MULTI): Primary | Chronic | ICD-10-CM

## 2023-12-06 DIAGNOSIS — Z79.4 TYPE 2 DIABETES MELLITUS WITHOUT COMPLICATION, WITH LONG-TERM CURRENT USE OF INSULIN (MULTI): ICD-10-CM

## 2023-12-06 DIAGNOSIS — E11.9 TYPE 2 DIABETES MELLITUS WITHOUT COMPLICATION, WITH LONG-TERM CURRENT USE OF INSULIN (MULTI): ICD-10-CM

## 2023-12-06 LAB — HBA1C MFR BLD: 7.8 % (ref 4.2–6.5)

## 2023-12-06 PROCEDURE — 1159F MED LIST DOCD IN RCRD: CPT | Performed by: FAMILY MEDICINE

## 2023-12-06 PROCEDURE — 3075F SYST BP GE 130 - 139MM HG: CPT | Performed by: FAMILY MEDICINE

## 2023-12-06 PROCEDURE — 3078F DIAST BP <80 MM HG: CPT | Performed by: FAMILY MEDICINE

## 2023-12-06 PROCEDURE — 2028F FOOT EXAM PERFORMED: CPT | Performed by: FAMILY MEDICINE

## 2023-12-06 PROCEDURE — 1036F TOBACCO NON-USER: CPT | Performed by: FAMILY MEDICINE

## 2023-12-06 PROCEDURE — 99214 OFFICE O/P EST MOD 30 MIN: CPT | Performed by: FAMILY MEDICINE

## 2023-12-06 PROCEDURE — 83036 HEMOGLOBIN GLYCOSYLATED A1C: CPT | Mod: CLIA WAIVED TEST | Performed by: FAMILY MEDICINE

## 2023-12-06 PROCEDURE — 1126F AMNT PAIN NOTED NONE PRSNT: CPT | Performed by: FAMILY MEDICINE

## 2023-12-06 PROCEDURE — 1160F RVW MEDS BY RX/DR IN RCRD: CPT | Performed by: FAMILY MEDICINE

## 2023-12-06 PROCEDURE — 3051F HG A1C>EQUAL 7.0%<8.0%: CPT | Performed by: FAMILY MEDICINE

## 2023-12-06 PROCEDURE — 36415 COLL VENOUS BLD VENIPUNCTURE: CPT

## 2023-12-06 PROCEDURE — 4010F ACE/ARB THERAPY RXD/TAKEN: CPT | Performed by: FAMILY MEDICINE

## 2023-12-06 PROCEDURE — 1158F ADVNC CARE PLAN TLK DOCD: CPT | Performed by: FAMILY MEDICINE

## 2023-12-06 RX ORDER — INSULIN GLARGINE-YFGN 100 [IU]/ML
14 INJECTION, SOLUTION SUBCUTANEOUS EVERY 24 HOURS
Qty: 3 ML | Refills: 12 | Status: SHIPPED | OUTPATIENT
Start: 2023-12-06 | End: 2024-02-27 | Stop reason: WASHOUT

## 2023-12-06 RX ORDER — QUETIAPINE FUMARATE 25 MG/1
12.5 TABLET, FILM COATED ORAL NIGHTLY
Qty: 45 TABLET | Refills: 3 | Status: SHIPPED | OUTPATIENT
Start: 2023-12-06 | End: 2024-03-25 | Stop reason: SDUPTHER

## 2023-12-06 ASSESSMENT — ENCOUNTER SYMPTOMS
DEPRESSION: 0
LOSS OF SENSATION IN FEET: 0
OCCASIONAL FEELINGS OF UNSTEADINESS: 0

## 2023-12-06 ASSESSMENT — PAIN SCALES - GENERAL: PAINLEVEL: 0-NO PAIN

## 2023-12-06 NOTE — PROGRESS NOTES
"Subjective   Patient ID: Isaiah Jin \"Arun" is a 68 y.o. male who presents for Follow-up (Follow up diabetes).  HPI  68-year-old male for checkup on diabetes, hypertension, insomnia.  Review of Systems  Constitutional: no chills, no fever and no night sweats.   Eyes: no blurred vision and no eyesight problems.   ENT: no hearing loss, no nasal congestion, no nasal discharge, no hoarseness and no sore throat.   Cardiovascular: no chest pain, no intermittent leg claudication, no lower extremity edema, no palpitations and no syncope.   Respiratory: no cough, no shortness of breath during exertion, no shortness of breath at rest and no wheezing.   Gastrointestinal: no abdominal pain, no blood in stools, no constipation, no diarrhea, no melena, no nausea, no rectal pain and no vomiting.   Genitourinary: no dysuria, no change in urinary frequency, no urinary hesitancy and no feelings of urinary urgency.   Neurological: no difficulty walking, no headache, no limb weakness, no numbness and no tingling.   Psychiatric: no anxiety, no depression, no anhedonia and no substance use disorders.   Endocrine: no recent weight gain and no recent weight loss.   Hematologic/Lymphatic: no tendency for easy bruising and no swollen glands.  Visit Vitals  /72 (BP Location: Left arm)   Pulse 56   Temp 36.2 °C (97.1 °F) (Temporal)        Objective   Physical Exam  Constitutional: Alert and in no acute distress. Well developed, well nourished.   Eyes: Pupils were equal in size, round, reactive to light (PERRL) with normal accommodation and extraocular movements intact (EOMI). Ophthalmoscopic examination: Normal.   Ears, Nose, Mouth, and Throat: External inspection of ears and nose: Normal. Otoscopic examination: Normal. Lips, teeth, and gums: Normal. Oropharynx: Normal.   Neck: No neck mass was observed. Supple. Thyroid not enlarged and there were no palpable thyroid nodules.   Cardiovascular: Heart rate and rhythm were normal, " normal S1 and S2, no gallops, no murmurs and no pericardial rub. Carotid pulses: Normal with no bruits. Abdominal aorta: Normal. Pedal pulses: Normal. No peripheral edema.   Pulmonary: No respiratory distress. Clear bilateral breath sounds.   Abdomen: Soft nontender; no abdominal mass palpated. Normal bowel sounds. No organomegaly.   Skin: Normal skin color and pigmentation, normal skin turgor, and no rash.   Psychiatric: Judgment and insight: Intact. Mood and affect: Normal.  Assessment/Plan   Problem List Items Addressed This Visit             ICD-10-CM    Type 2 diabetes mellitus without complications (CMS/HCC) - Primary (Chronic) E11.9    Relevant Medications    insulin glargine-yfgn (Semglee,insulin glarg-yfgn,Pen) 100 unit/mL (3 mL) Pen    Other Relevant Orders    POCT Glycosylated Hemoglobin (HGB A1C) docked device    Convulsions, unspecified convulsion type (CMS/HCC) R56.9     Other Visit Diagnoses         Codes    Primary insomnia     F51.01    Relevant Medications    QUEtiapine (SEROquel) 25 mg tablet

## 2023-12-07 RX ORDER — METOPROLOL SUCCINATE 50 MG/1
50 TABLET, EXTENDED RELEASE ORAL DAILY
Qty: 90 TABLET | Refills: 3 | Status: SHIPPED | OUTPATIENT
Start: 2023-12-07

## 2023-12-08 ENCOUNTER — APPOINTMENT (OUTPATIENT)
Dept: SPEECH THERAPY | Facility: CLINIC | Age: 68
End: 2023-12-08
Payer: MEDICARE

## 2023-12-13 ENCOUNTER — APPOINTMENT (OUTPATIENT)
Dept: SPEECH THERAPY | Facility: CLINIC | Age: 68
End: 2023-12-13
Payer: MEDICARE

## 2023-12-15 ENCOUNTER — APPOINTMENT (OUTPATIENT)
Dept: SPEECH THERAPY | Facility: CLINIC | Age: 68
End: 2023-12-15
Payer: MEDICARE

## 2023-12-20 ENCOUNTER — APPOINTMENT (OUTPATIENT)
Dept: SPEECH THERAPY | Facility: CLINIC | Age: 68
End: 2023-12-20
Payer: MEDICARE

## 2023-12-22 ENCOUNTER — APPOINTMENT (OUTPATIENT)
Dept: SPEECH THERAPY | Facility: CLINIC | Age: 68
End: 2023-12-22
Payer: MEDICARE

## 2023-12-27 ENCOUNTER — APPOINTMENT (OUTPATIENT)
Dept: SPEECH THERAPY | Facility: CLINIC | Age: 68
End: 2023-12-27
Payer: MEDICARE

## 2023-12-28 ENCOUNTER — OFFICE VISIT (OUTPATIENT)
Dept: ORTHOPEDIC SURGERY | Facility: CLINIC | Age: 68
End: 2023-12-28
Payer: MEDICARE

## 2023-12-28 VITALS — WEIGHT: 182 LBS | BODY MASS INDEX: 28.56 KG/M2 | HEIGHT: 67 IN

## 2023-12-28 DIAGNOSIS — G89.29 CHRONIC PAIN OF RIGHT KNEE: Primary | ICD-10-CM

## 2023-12-28 DIAGNOSIS — M17.11 ARTHRITIS OF RIGHT KNEE: ICD-10-CM

## 2023-12-28 DIAGNOSIS — M25.561 CHRONIC PAIN OF RIGHT KNEE: Primary | ICD-10-CM

## 2023-12-28 PROCEDURE — 20610 DRAIN/INJ JOINT/BURSA W/O US: CPT | Performed by: ORTHOPAEDIC SURGERY

## 2023-12-28 PROCEDURE — 4010F ACE/ARB THERAPY RXD/TAKEN: CPT | Performed by: ORTHOPAEDIC SURGERY

## 2023-12-28 PROCEDURE — 1160F RVW MEDS BY RX/DR IN RCRD: CPT | Performed by: ORTHOPAEDIC SURGERY

## 2023-12-28 PROCEDURE — 1036F TOBACCO NON-USER: CPT | Performed by: ORTHOPAEDIC SURGERY

## 2023-12-28 PROCEDURE — 3051F HG A1C>EQUAL 7.0%<8.0%: CPT | Performed by: ORTHOPAEDIC SURGERY

## 2023-12-28 PROCEDURE — 1159F MED LIST DOCD IN RCRD: CPT | Performed by: ORTHOPAEDIC SURGERY

## 2023-12-28 PROCEDURE — 1158F ADVNC CARE PLAN TLK DOCD: CPT | Performed by: ORTHOPAEDIC SURGERY

## 2023-12-28 PROCEDURE — 2028F FOOT EXAM PERFORMED: CPT | Performed by: ORTHOPAEDIC SURGERY

## 2023-12-28 PROCEDURE — 99213 OFFICE O/P EST LOW 20 MIN: CPT | Performed by: ORTHOPAEDIC SURGERY

## 2023-12-28 PROCEDURE — 1126F AMNT PAIN NOTED NONE PRSNT: CPT | Performed by: ORTHOPAEDIC SURGERY

## 2023-12-28 RX ORDER — LIDOCAINE HYDROCHLORIDE 10 MG/ML
2 INJECTION INFILTRATION; PERINEURAL
Status: COMPLETED | OUTPATIENT
Start: 2023-12-28 | End: 2023-12-28

## 2023-12-28 RX ORDER — TRIAMCINOLONE ACETONIDE 40 MG/ML
40 INJECTION, SUSPENSION INTRA-ARTICULAR; INTRAMUSCULAR
Status: COMPLETED | OUTPATIENT
Start: 2023-12-28 | End: 2023-12-28

## 2023-12-28 RX ADMIN — LIDOCAINE HYDROCHLORIDE 2 ML: 10 INJECTION INFILTRATION; PERINEURAL at 11:04

## 2023-12-28 RX ADMIN — TRIAMCINOLONE ACETONIDE 40 MG: 40 INJECTION, SUSPENSION INTRA-ARTICULAR; INTRAMUSCULAR at 11:04

## 2023-12-28 ASSESSMENT — ENCOUNTER SYMPTOMS: KNEE SWELLING: 1

## 2023-12-28 NOTE — PROGRESS NOTES
"Subjective    Patient ID: Isaiah Jin \"Arun" is a 68 y.o. male.    Chief Complaint: Follow-up of the Right Knee       68-year-old male with a long history of arthritis involving the right knee.  Nearly 6 months ago a right knee intra-articular injection of Kenalog was performed which he states was of benefit for 4 months.  He has recently noted over the past 1 to 2 months return of the pain and limitations of functions of ADL such as standing, walking and stair climbing.  There has been no new injury.  He has not noted any redness warmth and denies any fevers or chills.    This patient's past medical, social, and family history were reviewed as well as a review of systems including updates on the patient's information encounter sheet  Patient has a significant history of cardiac surgery 2 years ago and then a stroke that occurred in August 2022.  He is recovering from both remains on anticoagulant therapy    Physical Examination  Constitutional: Patient's height and weight reviewed, appears well kempt  Psychiatric: Alert and oriented ×3, appropriate mood and behavior  Pulmonary: Breathing appears nonlabored, no apparent distress  Lymphatic: No appreciable lymphadenopathy to both the upper and lower extremities  Skin: No open lesions, rashes, ulcerations    Musculoskeletal: Satisfactory motion of the right hip without groin or thigh pain elicited.  The right knee reveals a varus alignment of 5 to 7 degrees which is somewhat corrects to a valgus stress.  The knee has a lack of extension of 5 degrees of flexion of 115 degrees.  Ambulates with a slightly antalgic gait.    Assessment: Arthritis right knee    Plan: An extended discussion ensued with the patient regarding the treatment options for their knee condition. This included both nonoperative and operative treatment options.. The patient will continue with modifications of activities of daily living as well as an exercise program. As the patient desired an " intra-articular knee injection of Kenalog/lidocaine was performed today and tolerated well.. The patient will observe to see if the injection is of benefit. Follow-up on a when necessary basis.    Discussed at length the options for knee replacement and the expected outcome and rehabilitation course.  Patient will consider this and understands he will need cardiology and neurology clearance prior to surgery.    Right Knee     L Inj/Asp: R knee on 12/28/2023 11:04 AM  Indications: pain  Details: 22 G needle, anterolateral approach  Medications: 40 mg triamcinolone acetonide 40 mg/mL; 2 mL lidocaine 10 mg/mL (1 %)  Consent was given by the patient. Patient was prepped and draped in the usual sterile fashion.                     Current Outpatient Medications:     atorvastatin (Lipitor) 80 mg tablet, Take 1 tablet (80 mg) by mouth once daily., Disp: , Rfl:     clopidogrel (Plavix) 75 mg tablet, TAKE 1 TABLET BY MOUTH EVERY DAY, Disp: 90 tablet, Rfl: 1    cyanocobalamin (Vitamin B-12) 1,000 mcg tablet, Take by mouth., Disp: , Rfl:     fenofibrate (Triglide) 160 mg tablet, TAKE 1 TABLET BY MOUTH EVERY DAY, Disp: 90 tablet, Rfl: 3    furosemide (Lasix) 40 mg tablet, TAKE 1 TABLET BY MOUTH EVERY DAY, Disp: 90 tablet, Rfl: 2    insulin glargine-yfgn (Semglee,insulin glarg-yfgn,Pen) 100 unit/mL (3 mL) Pen, Inject 14 Units under the skin once every 24 hours. Take as directed per insulin instructions., Disp: 3 mL, Rfl: 12    losartan (Cozaar) 25 mg tablet, TAKE 1 TABLET BY MOUTH EVERY DAY AS DIRECTED, Disp: 90 tablet, Rfl: 3    metFORMIN XR (Glucophage-XR) 500 mg 24 hr tablet, Take 1 tablet (500 mg) by mouth once daily., Disp: , Rfl:     metoprolol succinate XL (Toprol-XL) 50 mg 24 hr tablet, TAKE 1 TABLET BY MOUTH EVERY DAY, Disp: 90 tablet, Rfl: 3    multivitamin with minerals (multivit-min-iron fum-folic ac) tablet, Take by mouth., Disp: , Rfl:     OneTouch Delica Plus Lancet 33 gauge misc, TEST ONCE DAILY AS DIRECTED,  "Disp: , Rfl:     OneTouch Verio test strips strip, TEST EVERY DAY, Disp: , Rfl:     pen needle, diabetic (BD Ultra-Fine Kerry Pen Needle) 32 gauge x 5/32\" needle, AS DIRECTED, Disp: 100 each, Rfl: 3    QUEtiapine (SEROquel) 25 mg tablet, Take 0.5 tablets (12.5 mg) by mouth once daily at bedtime., Disp: 45 tablet, Rfl: 3    tamsulosin (Flomax) 0.4 mg 24 hr capsule, TAKE 1 CAPSULE BY MOUTH EVERYDAY AT BEDTIME, Disp: 90 capsule, Rfl: 1    Xarelto 20 mg tablet, Take 1 tablet (20 mg) by mouth once daily at bedtime., Disp: , Rfl:       "

## 2023-12-29 ENCOUNTER — APPOINTMENT (OUTPATIENT)
Dept: SPEECH THERAPY | Facility: CLINIC | Age: 68
End: 2023-12-29
Payer: MEDICARE

## 2024-01-22 DIAGNOSIS — Z79.2 NEED FOR PROPHYLACTIC ANTIBIOTIC: Primary | ICD-10-CM

## 2024-01-22 RX ORDER — AMOXICILLIN 500 MG/1
2000 CAPSULE ORAL
Qty: 4 CAPSULE | Refills: 0 | Status: SHIPPED | OUTPATIENT
Start: 2024-01-22 | End: 2024-01-22

## 2024-01-30 PROBLEM — E11.9 TYPE 2 DIABETES MELLITUS WITHOUT COMPLICATION (MULTI): Status: ACTIVE | Noted: 2022-09-11

## 2024-01-30 PROBLEM — R41.89 UNRESPONSIVE: Status: ACTIVE | Noted: 2024-01-30

## 2024-01-30 PROBLEM — N40.0 BENIGN PROSTATIC HYPERPLASIA: Status: ACTIVE | Noted: 2022-09-27

## 2024-01-30 PROBLEM — R29.810 FACIAL WEAKNESS: Status: ACTIVE | Noted: 2024-01-30

## 2024-01-30 PROBLEM — I42.9 CARDIOMYOPATHY (MULTI): Status: ACTIVE | Noted: 2022-09-11

## 2024-01-30 PROBLEM — H53.40 VISUAL FIELD DEFECT: Status: ACTIVE | Noted: 2023-08-23

## 2024-01-30 PROBLEM — R13.10 DYSPHAGIA: Status: ACTIVE | Noted: 2024-01-30

## 2024-01-30 PROBLEM — Z98.890 HISTORY OF OPEN HEART SURGERY: Status: ACTIVE | Noted: 2024-01-30

## 2024-01-30 PROBLEM — G93.9 DISORDER OF BRAIN: Status: ACTIVE | Noted: 2024-01-30

## 2024-01-30 PROBLEM — R55 SYNCOPE AND COLLAPSE: Status: ACTIVE | Noted: 2022-09-11

## 2024-01-30 PROBLEM — M25.569 KNEE PAIN: Status: ACTIVE | Noted: 2023-07-10

## 2024-01-30 PROBLEM — R41.89 IMPAIRED COGNITION: Status: ACTIVE | Noted: 2024-01-30

## 2024-01-30 PROBLEM — I71.20 THORACIC AORTIC ANEURYSM (TAA) (CMS-HCC): Status: ACTIVE | Noted: 2023-08-23

## 2024-01-30 PROBLEM — S89.90XA INJURY OF LOWER EXTREMITY: Status: ACTIVE | Noted: 2023-08-23

## 2024-01-30 PROBLEM — R56.9 SEIZURE (MULTI): Status: ACTIVE | Noted: 2023-12-06

## 2024-01-30 PROBLEM — M17.11 OSTEOARTHRITIS OF RIGHT KNEE: Status: ACTIVE | Noted: 2024-01-30

## 2024-01-30 PROBLEM — F51.01 PRIMARY INSOMNIA: Status: ACTIVE | Noted: 2024-01-30

## 2024-01-30 PROBLEM — F06.30 MOOD DISORDER DUE TO A GENERAL MEDICAL CONDITION: Status: ACTIVE | Noted: 2022-09-11

## 2024-01-30 PROBLEM — D17.20 LIPOMA OF EXTREMITY: Status: ACTIVE | Noted: 2024-01-30

## 2024-01-30 PROBLEM — I10 PRIMARY HYPERTENSION: Status: ACTIVE | Noted: 2022-09-11

## 2024-02-11 ENCOUNTER — APPOINTMENT (OUTPATIENT)
Dept: CARDIOLOGY | Facility: HOSPITAL | Age: 69
DRG: 065 | End: 2024-02-11
Payer: MEDICARE

## 2024-02-11 ENCOUNTER — HOSPITAL ENCOUNTER (INPATIENT)
Facility: HOSPITAL | Age: 69
LOS: 2 days | Discharge: HOME | DRG: 065 | End: 2024-02-13
Attending: STUDENT IN AN ORGANIZED HEALTH CARE EDUCATION/TRAINING PROGRAM | Admitting: INTERNAL MEDICINE
Payer: MEDICARE

## 2024-02-11 ENCOUNTER — APPOINTMENT (OUTPATIENT)
Dept: RADIOLOGY | Facility: HOSPITAL | Age: 69
DRG: 065 | End: 2024-02-11
Payer: MEDICARE

## 2024-02-11 DIAGNOSIS — R55 SYNCOPE AND COLLAPSE: ICD-10-CM

## 2024-02-11 DIAGNOSIS — Z86.73 HISTORY OF STROKE: ICD-10-CM

## 2024-02-11 DIAGNOSIS — G45.9 TRANSIENT CEREBRAL ISCHEMIA, UNSPECIFIED TYPE: ICD-10-CM

## 2024-02-11 DIAGNOSIS — I42.8 OTHER CARDIOMYOPATHY (MULTI): ICD-10-CM

## 2024-02-11 DIAGNOSIS — Z95.1 S/P CABG X 4: ICD-10-CM

## 2024-02-11 DIAGNOSIS — R29.898 LUE WEAKNESS: Primary | ICD-10-CM

## 2024-02-11 DIAGNOSIS — I63.9 CEREBROVASCULAR ACCIDENT (CVA), UNSPECIFIED MECHANISM (MULTI): ICD-10-CM

## 2024-02-11 DIAGNOSIS — I63.9 CARDIOEMBOLIC STROKE (MULTI): ICD-10-CM

## 2024-02-11 DIAGNOSIS — I63.9 ACUTE CVA (CEREBROVASCULAR ACCIDENT) (MULTI): ICD-10-CM

## 2024-02-11 DIAGNOSIS — Z87.898: ICD-10-CM

## 2024-02-11 LAB
ALBUMIN SERPL BCP-MCNC: 4.7 G/DL (ref 3.4–5)
ALP SERPL-CCNC: 46 U/L (ref 33–136)
ALT SERPL W P-5'-P-CCNC: 19 U/L (ref 10–52)
ANION GAP SERPL CALC-SCNC: 12 MMOL/L (ref 10–20)
APTT PPP: 41 SECONDS (ref 27–38)
AST SERPL W P-5'-P-CCNC: 15 U/L (ref 9–39)
BASOPHILS # BLD AUTO: 0.03 X10*3/UL (ref 0–0.1)
BASOPHILS NFR BLD AUTO: 0.4 %
BILIRUB SERPL-MCNC: 0.5 MG/DL (ref 0–1.2)
BUN SERPL-MCNC: 27 MG/DL (ref 6–23)
CALCIUM SERPL-MCNC: 10 MG/DL (ref 8.6–10.3)
CARDIAC TROPONIN I PNL SERPL HS: 9 NG/L (ref 0–20)
CHLORIDE SERPL-SCNC: 102 MMOL/L (ref 98–107)
CHOLEST SERPL-MCNC: 124 MG/DL (ref 0–199)
CHOLESTEROL/HDL RATIO: 3.8
CO2 SERPL-SCNC: 26 MMOL/L (ref 21–32)
CREAT SERPL-MCNC: 1.17 MG/DL (ref 0.5–1.3)
EGFRCR SERPLBLD CKD-EPI 2021: 67 ML/MIN/1.73M*2
EOSINOPHIL # BLD AUTO: 0.12 X10*3/UL (ref 0–0.7)
EOSINOPHIL NFR BLD AUTO: 1.6 %
ERYTHROCYTE [DISTWIDTH] IN BLOOD BY AUTOMATED COUNT: 12.4 % (ref 11.5–14.5)
GLUCOSE BLD MANUAL STRIP-MCNC: 175 MG/DL (ref 74–99)
GLUCOSE BLD MANUAL STRIP-MCNC: 184 MG/DL (ref 74–99)
GLUCOSE BLD MANUAL STRIP-MCNC: 217 MG/DL (ref 74–99)
GLUCOSE SERPL-MCNC: 210 MG/DL (ref 74–99)
HCT VFR BLD AUTO: 39.8 % (ref 41–52)
HDLC SERPL-MCNC: 32.7 MG/DL
HGB BLD-MCNC: 13.5 G/DL (ref 13.5–17.5)
HOLD SPECIMEN: NORMAL
IMM GRANULOCYTES # BLD AUTO: 0.06 X10*3/UL (ref 0–0.7)
IMM GRANULOCYTES NFR BLD AUTO: 0.8 % (ref 0–0.9)
INR PPP: 1.3 (ref 0.9–1.1)
LDLC SERPL CALC-MCNC: 62 MG/DL
LYMPHOCYTES # BLD AUTO: 1.77 X10*3/UL (ref 1.2–4.8)
LYMPHOCYTES NFR BLD AUTO: 23.1 %
MCH RBC QN AUTO: 30.6 PG (ref 26–34)
MCHC RBC AUTO-ENTMCNC: 33.9 G/DL (ref 32–36)
MCV RBC AUTO: 90 FL (ref 80–100)
MONOCYTES # BLD AUTO: 0.64 X10*3/UL (ref 0.1–1)
MONOCYTES NFR BLD AUTO: 8.4 %
NEUTROPHILS # BLD AUTO: 5.03 X10*3/UL (ref 1.2–7.7)
NEUTROPHILS NFR BLD AUTO: 65.7 %
NON HDL CHOLESTEROL: 91 MG/DL (ref 0–149)
NRBC BLD-RTO: 0 /100 WBCS (ref 0–0)
PLATELET # BLD AUTO: 275 X10*3/UL (ref 150–450)
POCT GLUCOSE: 217 MG/DL (ref 74–99)
POTASSIUM SERPL-SCNC: 3.9 MMOL/L (ref 3.5–5.3)
PROT SERPL-MCNC: 7 G/DL (ref 6.4–8.2)
PROTHROMBIN TIME: 14.8 SECONDS (ref 9.8–12.8)
RBC # BLD AUTO: 4.41 X10*6/UL (ref 4.5–5.9)
SODIUM SERPL-SCNC: 136 MMOL/L (ref 136–145)
TRIGL SERPL-MCNC: 147 MG/DL (ref 0–149)
VLDL: 29 MG/DL (ref 0–40)
WBC # BLD AUTO: 7.6 X10*3/UL (ref 4.4–11.3)

## 2024-02-11 PROCEDURE — 85730 THROMBOPLASTIN TIME PARTIAL: CPT | Performed by: STUDENT IN AN ORGANIZED HEALTH CARE EDUCATION/TRAINING PROGRAM

## 2024-02-11 PROCEDURE — 83718 ASSAY OF LIPOPROTEIN: CPT

## 2024-02-11 PROCEDURE — 99223 1ST HOSP IP/OBS HIGH 75: CPT

## 2024-02-11 PROCEDURE — 99291 CRITICAL CARE FIRST HOUR: CPT | Mod: 25 | Performed by: STUDENT IN AN ORGANIZED HEALTH CARE EDUCATION/TRAINING PROGRAM

## 2024-02-11 PROCEDURE — 83036 HEMOGLOBIN GLYCOSYLATED A1C: CPT | Mod: PARLAB

## 2024-02-11 PROCEDURE — 70450 CT HEAD/BRAIN W/O DYE: CPT | Performed by: RADIOLOGY

## 2024-02-11 PROCEDURE — 82947 ASSAY GLUCOSE BLOOD QUANT: CPT

## 2024-02-11 PROCEDURE — 70450 CT HEAD/BRAIN W/O DYE: CPT

## 2024-02-11 PROCEDURE — 85610 PROTHROMBIN TIME: CPT | Performed by: STUDENT IN AN ORGANIZED HEALTH CARE EDUCATION/TRAINING PROGRAM

## 2024-02-11 PROCEDURE — 93005 ELECTROCARDIOGRAM TRACING: CPT

## 2024-02-11 PROCEDURE — 84484 ASSAY OF TROPONIN QUANT: CPT | Performed by: STUDENT IN AN ORGANIZED HEALTH CARE EDUCATION/TRAINING PROGRAM

## 2024-02-11 PROCEDURE — 80053 COMPREHEN METABOLIC PANEL: CPT | Performed by: STUDENT IN AN ORGANIZED HEALTH CARE EDUCATION/TRAINING PROGRAM

## 2024-02-11 PROCEDURE — 85025 COMPLETE CBC W/AUTO DIFF WBC: CPT | Performed by: STUDENT IN AN ORGANIZED HEALTH CARE EDUCATION/TRAINING PROGRAM

## 2024-02-11 PROCEDURE — 36415 COLL VENOUS BLD VENIPUNCTURE: CPT

## 2024-02-11 PROCEDURE — 1200000002 HC GENERAL ROOM WITH TELEMETRY DAILY

## 2024-02-11 PROCEDURE — 36415 COLL VENOUS BLD VENIPUNCTURE: CPT | Performed by: STUDENT IN AN ORGANIZED HEALTH CARE EDUCATION/TRAINING PROGRAM

## 2024-02-11 PROCEDURE — 2500000002 HC RX 250 W HCPCS SELF ADMINISTERED DRUGS (ALT 637 FOR MEDICARE OP, ALT 636 FOR OP/ED)

## 2024-02-11 RX ORDER — ATORVASTATIN CALCIUM 80 MG/1
80 TABLET, FILM COATED ORAL DAILY
Status: DISCONTINUED | OUTPATIENT
Start: 2024-02-11 | End: 2024-02-13 | Stop reason: HOSPADM

## 2024-02-11 RX ORDER — DEXTROSE 50 % IN WATER (D50W) INTRAVENOUS SYRINGE
25
Status: DISCONTINUED | OUTPATIENT
Start: 2024-02-11 | End: 2024-02-13 | Stop reason: HOSPADM

## 2024-02-11 RX ORDER — TAMSULOSIN HYDROCHLORIDE 0.4 MG/1
0.4 CAPSULE ORAL NIGHTLY
Status: DISCONTINUED | OUTPATIENT
Start: 2024-02-11 | End: 2024-02-13 | Stop reason: HOSPADM

## 2024-02-11 RX ORDER — DEXTROSE MONOHYDRATE 100 MG/ML
0.3 INJECTION, SOLUTION INTRAVENOUS ONCE AS NEEDED
Status: DISCONTINUED | OUTPATIENT
Start: 2024-02-11 | End: 2024-02-13 | Stop reason: HOSPADM

## 2024-02-11 RX ORDER — QUETIAPINE FUMARATE 25 MG/1
12.5 TABLET, FILM COATED ORAL NIGHTLY
Status: DISCONTINUED | OUTPATIENT
Start: 2024-02-11 | End: 2024-02-13 | Stop reason: HOSPADM

## 2024-02-11 RX ORDER — ACETAMINOPHEN, DEXTROMETHORPHAN HBR, DOXYLAMINE SUCCINATE, PHENYLEPHRINE HCL 650; 20; 12.5; 1 MG/30ML; MG/30ML; MG/30ML; MG/30ML
1 SOLUTION ORAL EVERY MORNING
COMMUNITY

## 2024-02-11 RX ORDER — INSULIN LISPRO 100 [IU]/ML
0-10 INJECTION, SOLUTION INTRAVENOUS; SUBCUTANEOUS
Status: DISCONTINUED | OUTPATIENT
Start: 2024-02-12 | End: 2024-02-13 | Stop reason: HOSPADM

## 2024-02-11 RX ORDER — METOPROLOL SUCCINATE 50 MG/1
50 TABLET, EXTENDED RELEASE ORAL DAILY
Status: DISCONTINUED | OUTPATIENT
Start: 2024-02-11 | End: 2024-02-13 | Stop reason: HOSPADM

## 2024-02-11 RX ORDER — LOSARTAN POTASSIUM 25 MG/1
25 TABLET ORAL DAILY
Status: DISCONTINUED | OUTPATIENT
Start: 2024-02-11 | End: 2024-02-13 | Stop reason: HOSPADM

## 2024-02-11 RX ORDER — FENOFIBRATE 160 MG/1
160 TABLET ORAL DAILY
Status: DISCONTINUED | OUTPATIENT
Start: 2024-02-11 | End: 2024-02-13 | Stop reason: HOSPADM

## 2024-02-11 RX ORDER — CLOPIDOGREL BISULFATE 75 MG/1
75 TABLET ORAL DAILY
Status: DISCONTINUED | OUTPATIENT
Start: 2024-02-11 | End: 2024-02-13 | Stop reason: HOSPADM

## 2024-02-11 RX ORDER — FUROSEMIDE 40 MG/1
40 TABLET ORAL DAILY
Status: DISCONTINUED | OUTPATIENT
Start: 2024-02-11 | End: 2024-02-13 | Stop reason: HOSPADM

## 2024-02-11 RX ADMIN — QUETIAPINE FUMARATE 12.5 MG: 25 TABLET ORAL at 21:42

## 2024-02-11 RX ADMIN — RIVAROXABAN 20 MG: 20 TABLET, FILM COATED ORAL at 18:39

## 2024-02-11 RX ADMIN — TAMSULOSIN HYDROCHLORIDE 0.4 MG: 0.4 CAPSULE ORAL at 21:42

## 2024-02-11 SDOH — SOCIAL STABILITY: SOCIAL INSECURITY: ARE YOU OR HAVE YOU BEEN THREATENED OR ABUSED PHYSICALLY, EMOTIONALLY, OR SEXUALLY BY ANYONE?: NO

## 2024-02-11 SDOH — SOCIAL STABILITY: SOCIAL INSECURITY: DOES ANYONE TRY TO KEEP YOU FROM HAVING/CONTACTING OTHER FRIENDS OR DOING THINGS OUTSIDE YOUR HOME?: NO

## 2024-02-11 SDOH — SOCIAL STABILITY: SOCIAL INSECURITY: DO YOU FEEL UNSAFE GOING BACK TO THE PLACE WHERE YOU ARE LIVING?: NO

## 2024-02-11 SDOH — SOCIAL STABILITY: SOCIAL INSECURITY: HAS ANYONE EVER THREATENED TO HURT YOUR FAMILY OR YOUR PETS?: NO

## 2024-02-11 SDOH — SOCIAL STABILITY: SOCIAL INSECURITY: WERE YOU ABLE TO COMPLETE ALL THE BEHAVIORAL HEALTH SCREENINGS?: YES

## 2024-02-11 SDOH — SOCIAL STABILITY: SOCIAL INSECURITY: ARE THERE ANY APPARENT SIGNS OF INJURIES/BEHAVIORS THAT COULD BE RELATED TO ABUSE/NEGLECT?: NO

## 2024-02-11 SDOH — SOCIAL STABILITY: SOCIAL INSECURITY: DO YOU FEEL ANYONE HAS EXPLOITED OR TAKEN ADVANTAGE OF YOU FINANCIALLY OR OF YOUR PERSONAL PROPERTY?: NO

## 2024-02-11 SDOH — SOCIAL STABILITY: SOCIAL INSECURITY: HAVE YOU HAD THOUGHTS OF HARMING ANYONE ELSE?: NO

## 2024-02-11 SDOH — SOCIAL STABILITY: SOCIAL INSECURITY: ABUSE: ADULT

## 2024-02-11 ASSESSMENT — ACTIVITIES OF DAILY LIVING (ADL)
WALKS IN HOME: INDEPENDENT
LACK_OF_TRANSPORTATION: PATIENT DECLINED
ADEQUATE_TO_COMPLETE_ADL: YES
GROOMING: INDEPENDENT
FEEDING YOURSELF: INDEPENDENT
PATIENT'S MEMORY ADEQUATE TO SAFELY COMPLETE DAILY ACTIVITIES?: YES
TOILETING: INDEPENDENT
DRESSING YOURSELF: INDEPENDENT
JUDGMENT_ADEQUATE_SAFELY_COMPLETE_DAILY_ACTIVITIES: YES
HEARING - RIGHT EAR: FUNCTIONAL
HEARING - LEFT EAR: FUNCTIONAL
BATHING: INDEPENDENT

## 2024-02-11 ASSESSMENT — COGNITIVE AND FUNCTIONAL STATUS - GENERAL
MOBILITY SCORE: 24
PATIENT BASELINE BEDBOUND: NO
DAILY ACTIVITIY SCORE: 24
MOBILITY SCORE: 24
DAILY ACTIVITIY SCORE: 24

## 2024-02-11 ASSESSMENT — LIFESTYLE VARIABLES
EVER FELT BAD OR GUILTY ABOUT YOUR DRINKING: NO
SKIP TO QUESTIONS 9-10: 1
HOW OFTEN DO YOU HAVE 6 OR MORE DRINKS ON ONE OCCASION: NEVER
AUDIT-C TOTAL SCORE: 0
EVER HAD A DRINK FIRST THING IN THE MORNING TO STEADY YOUR NERVES TO GET RID OF A HANGOVER: NO
HOW MANY STANDARD DRINKS CONTAINING ALCOHOL DO YOU HAVE ON A TYPICAL DAY: PATIENT DOES NOT DRINK
HAVE YOU EVER FELT YOU SHOULD CUT DOWN ON YOUR DRINKING: NO
AUDIT-C TOTAL SCORE: 0
HAVE PEOPLE ANNOYED YOU BY CRITICIZING YOUR DRINKING: NO
HOW OFTEN DO YOU HAVE A DRINK CONTAINING ALCOHOL: NEVER

## 2024-02-11 ASSESSMENT — COLUMBIA-SUICIDE SEVERITY RATING SCALE - C-SSRS
1. IN THE PAST MONTH, HAVE YOU WISHED YOU WERE DEAD OR WISHED YOU COULD GO TO SLEEP AND NOT WAKE UP?: NO
6. HAVE YOU EVER DONE ANYTHING, STARTED TO DO ANYTHING, OR PREPARED TO DO ANYTHING TO END YOUR LIFE?: NO
2. HAVE YOU ACTUALLY HAD ANY THOUGHTS OF KILLING YOURSELF?: NO

## 2024-02-11 ASSESSMENT — PAIN SCALES - GENERAL
PAINLEVEL_OUTOF10: 0 - NO PAIN
PAINLEVEL_OUTOF10: 0 - NO PAIN

## 2024-02-11 ASSESSMENT — PATIENT HEALTH QUESTIONNAIRE - PHQ9
1. LITTLE INTEREST OR PLEASURE IN DOING THINGS: NOT AT ALL
2. FEELING DOWN, DEPRESSED OR HOPELESS: NOT AT ALL
SUM OF ALL RESPONSES TO PHQ9 QUESTIONS 1 & 2: 0

## 2024-02-11 ASSESSMENT — PAIN - FUNCTIONAL ASSESSMENT: PAIN_FUNCTIONAL_ASSESSMENT: 0-10

## 2024-02-11 NOTE — PROGRESS NOTES
"Pharmacy Medication History Review    Isaiah Jin \"Duane\" is a 68 y.o. male admitted for No Principal Problem: There is no principal problem currently on the Problem List. Please update the Problem List and refresh.. Pharmacy reviewed the patient's [id number] medications and allergies for accuracy.    The list below reflectives the updated PTA list. Please review each medication in order reconciliation for additional clarification and justification.  Prior to Admission medications    Medication Sig Start Date End Date Taking? Authorizing Provider   clopidogrel (Plavix) 75 mg tablet TAKE 1 TABLET BY MOUTH EVERY DAY  Patient taking differently: Take 1 tablet (75 mg) by mouth once daily in the morning. 1/30/24   Kwabena Sustersic V, DO   tamsulosin (Flomax) 0.4 mg 24 hr capsule TAKE 1 CAPSULE BY MOUTH EVERYDAY AT BEDTIME 1/30/24   Kwabena Sustersic V, DO   atorvastatin (Lipitor) 80 mg tablet TAKE 1 TABLET BY MOUTH EVERY DAY  Patient taking differently: Take 1 tablet (80 mg) by mouth once daily in the morning. 1/30/24   Kwabena Sustersic V, DO   cyanocobalamin, vitamin B-12, (Vitamin B-12) 1,000 mcg tablet extended release Take 1 tablet (1,000 mcg) by mouth once daily in the morning.    Historical Provider, MD   fenofibrate (Triglide) 160 mg tablet TAKE 1 TABLET BY MOUTH EVERY DAY  Patient taking differently: Take 1 tablet (160 mg) by mouth once daily in the morning. 5/19/23   Kwabena Sustersic V, DO   furosemide (Lasix) 40 mg tablet TAKE 1 TABLET BY MOUTH EVERY DAY  Patient taking differently: Take 1 tablet (40 mg) by mouth once daily in the morning. 12/15/23   Kwabena Sustersic V, DO   Lantus Solostar U-100 Insulin 100 unit/mL (3 mL) pen Inject 14 Units under the skin once daily at bedtime. 1/29/24   Historical Provider, MD   losartan (Cozaar) 25 mg tablet TAKE 1 TABLET BY MOUTH EVERY DAY AS DIRECTED  Patient taking differently: Take 1 tablet (25 mg) by mouth once daily in the morning. 12/15/23   Kwabena Sustersic " V, DO   metFORMIN XR (Glucophage-XR) 500 mg 24 hr tablet Take 1 tablet (500 mg) by mouth once daily in the evening. 10/21/20   Historical Provider, MD   metoprolol succinate XL (Toprol-XL) 50 mg 24 hr tablet TAKE 1 TABLET BY MOUTH EVERY DAY  Patient taking differently: Take 1 tablet (50 mg) by mouth once daily in the morning. 12/7/23   Anuj Ham MD   multivitamin with minerals (multivit-min-iron fum-folic ac) tablet Take 1 tablet by mouth once daily in the morning.    Historical Provider, MD   QUEtiapine (SEROquel) 25 mg tablet Take 0.5 tablets (12.5 mg) by mouth once daily at bedtime. 12/6/23 12/5/24  Kwabena Sustersic V, DO   Xarelto 20 mg tablet Take 1 tablet (20 mg) by mouth once daily in the evening.    Historical Provider, MD        The list below reflectives the updated allergy list. Please review each documented allergy for additional clarification and justification.  Allergies  Reviewed by Nubia Martinez RN on 2/11/2024   No Known Allergies         Below are additional concerns with the patient's PTA list.      Bernadette Candelario CPhT

## 2024-02-11 NOTE — H&P
History Of Present Illness  Duane Jin is a 68 y.o. male with past med history of T2DM, CAD, CABG 2020, AVR, CVA 2022, HLD, HTN presenting to [facility] ED with chief complaint of left upper extremity weakness.  Patient is accompanied by his wife in the ED who also contributes to history taking.  Patient does have a history of stroke in 2022 and does have some remaining deficits from that time including left upper extremity weakness, left facial droop, dysarthria, and left peripheral visual field defects.  Patient states that he was then bathroom this morning and kept dropping his toothbrush.  The wife also states when he came out to the kitchen he was unable to pull out his chair from under the table.  Patient does follow with neurology as an outpatient since his stroke and is currently on Plavix, Xarelto, and high-dose statin.  Patient denied any chest pain, palpitations, numbness or tingling in extremities, visual changes, headache.  Patient was then transported to the ED.  On arrival /76, HR 63, satting 95% on room air.  CT head showed no acute intracranial pathology and remote infarcts and left parietal occipital lobe regions.  Initial labs grossly WNL with the exception of hyperglycemia to 10.  Patient to be admitted for stroke workup.     Past Medical History  Past Medical History:   Diagnosis Date    Aortic stenosis 05/17/2023    Bicuspid AV moderate, s/p Large [place] AVR 12/2020    CAD (coronary artery disease) 05/17/2023    Elevated CAC @ 1353, s/p CABG 12/2020 with OIMA-LAD, SVG-PDA, OM1 Y graft to OM2    Cardiomyopathy, unspecified (CMS/[facility]) 09/11/2022    EF 35-40%, now 40-45%    Cerebrovascular accident (CVA) (CMS/[facility]) 05/17/2023    Post OHS 12/2020 with left-sided visual deficit, recurrent CVA 8/2022. ILANA negative. Now on Eliquis and Plavix.    Essential (primary) hypertension 09/11/2022    Mitral valve regurgitation 08/23/2023    Mild to moderate    Mixed hyperlipidemia 08/23/2023      Sustersic following    Other specified health status     No known health problems    Right hemisphere, cerebral infarction (CMS/[facility]) 09/11/2022    S/P AVR (aortic valve replacement) 08/23/2023    Thoracic aortic aneurysm (CMS/[facility]) 08/23/2023    4.6 cm s/p #32 Gelweave aortic graft    Type 2 diabetes mellitus without complications (CMS/[facility]) 09/11/2022    Unspecified sequelae of cerebral infarction 07/23/2021    H/O: stroke with residual effects       Surgical History  Past Surgical History:   Procedure Laterality Date    CT ANGIO NECK  1/5/2021    CT NECK ANGIO W AND WO IV CONTRAST 1/5/2021 [facility]    CT ANGIO NECK  8/18/2022    CT NECK ANGIO W AND WO IV CONTRAST 8/18/2022 [facility] CLINICAL LEGAC[facility]    CT HEAD ANGIO W AND WO IV CONTRAST  1/5/2021    CT HEAD ANGIO W AND WO IV CONTRAST 1/5/2021 [facility]    CT HEAD ANGIO W AND WO IV CONTRAST  8/18/2022    CT HEAD ANGIO W AND WO IV CONTRAST 8/18/2022 [facility] CLINICAL LEGACY    OTHER SURGICAL HISTORY  02/01/2021    Coronary artery bypass graft        Social History  He reports that he has never smoked. He has never been exposed to tobacco smoke. He has never used smokeless tobacco. He reports that he does not currently use alcohol. He reports that he does not use drugs.    Family History  Family History   Problem Relation Name Age of Onset    Coronary artery disease Father      No Known Problems Sibling          Allergies  Patient has no known allergies.    Review of Systems  10 point ROS was completed and is negative as otherwise stated in HPI  Physical Exam   Physical Exam:   General appearance: no acute distress, well-appearing   HEENT: Atraumatic/normocephalic, moist mucosa  Neck: supple without obvious goiter, JVD not appreciated  Respiratory: good air movement, appropriate respiratory effort, no wheezing or crackles  Cardiovascular: regular rate, regular rhythm, no peripheral edema  Abdomen: no organomegaly, no tenderness to palpation in all  "quadrants  Extremities: strong peripheral pulses, no grossly obvious deformities   Skin: intact, no rashes   Neurologic: At time of exam I would do this patient NIH of 0 as he appears to be at baseline.  Dysarthria, left visual peripheral field defect, mild left upper extremity weakness noted but per wife and patient this is his baseline  Psych: appropriate mood & affect, cooperative   Last Recorded Vitals  Blood pressure 122/77, pulse 57, temperature 36 °C (96.8 °F), temperature source Temporal, resp. rate (!) 26, height 1.676 m (5' 6\"), weight 80.5 kg (177 lb 7.5 oz), SpO2 98 %.    Relevant Results         Assessment/Plan   Principal Problem:    LUE weakness      #TIA  #CAD  #HTN  #HLD  #Hx CVA 2022  #T2DM  -Continue home Plavix and Xarelto  -Neurochecks  -Lipitor 80 mg daily  -Neurology consulted  -PT/OT  -Patient n.p.o. until evaluated by SLP  -Obtain echo, previous echo 8/23 mildly reduced ejection fraction 40%  -Lipid panel A1c pending   -Patient has documented metallic implant, unclear if patient unable to undergo MRI/MRA  -Continue home Lasix, metoprolol, and losartan  -14 units Lantus at bedtime and sliding scale    Code: Full  DVT PPx: Xarelto  Diet: N.p.o. until SLP evaluation, then diabetic diet           Jonathan Guy, DO    "

## 2024-02-11 NOTE — ED PROVIDER NOTES
History/Exam limitations: none.   Additional history was obtained from patient and EMS personnel.    HPI:       Isaiah Jin is a 68 y.o. with a past medical history that includes prior CVA with residual speech difficulties that is on Xarelto and Plavix presents with left arm numbness and weakness.  Awoke at 3 this morning was in his usual state of health.  Upon awakening later this morning, he noted left arm tingling.  Wife also noted that he was unable to use his left arm to pull out a chair, which is unusual for him.  Wife reported to EMS that the patient speech is currently at baseline.  On arrival, patient does have drift with his left upper extremity.  Speech continues to be at baseline per EMS.  No visual field deficits.  No aphasia.  No neglect.  Emergently taken to CT.     Last Known Well Time: 0300 2/11/24        ------------------------------------------------------------------------------------------------------------------------------------------     Physical Exam:    ED Triage Vitals   Temp Pulse Resp BP   -- -- -- --      SpO2 Temp src Heart Rate Source Patient Position   -- -- -- --      BP Location FiO2 (%)     -- --          Gen: Not in acute distress  Head/Neck: NCAT  Eyes: Anicteric sclerae, noninjected conjunctivae  Nose: No rhinorrhea  Mouth:  MMM  Heart: Regular rate and rhythm w/ no murmurs, rubs, gallops  Lungs: CTAB w/o wheezes, rales, rhonchi, no increased work of breathing  Abdomen: Soft, NT/ND  Musculoskeletal: No deformities  Extremities: No edema.  Neurologic: Please see below for NIHSS  Skin: No rashes noted  Psychological: Calm        Interval: Baseline  Time: 12:25 PM  Person Administering Scale: Bay Clements MD     1a  Level of consciousness: 0=alert; keenly responsive   1b. LOC questions:  0=Performs both tasks correctly   1c. LOC commands: 0=Performs both tasks correctly   2.  Best Gaze: 0=normal   3. Visual: 0=No visual loss   4. Facial Palsy: 0=Normal symmetric  "movement   5a. Motor left arm: 1=Drift, limb holds 90 (or 45) degrees but drifts down before full 10 seconds: does not hit bed   5b.  Motor right arm: 0=No drift, limb holds 90 (or 45) degrees for full 10 seconds   6a. motor left le=No drift, limb holds 90 (or 45) degrees for full 10 seconds   6b  Motor right le=No drift, limb holds 90 (or 45) degrees for full 10 seconds   7. Limb Ataxia: 0=Absent   8.  Sensory: 0=Normal; no sensory loss   9. Best Language:  0=No aphasia, normal   10. Dysarthria: 1=Mild to moderate, patient slurs at least some words and at worst, can be understood with some difficulty   11. Extinction and Inattention: 0=No abnormality     Total:   2         VAN: VAN: Negative     ------------------------------------------------------------------------------------------------------------------------------------------     Medical Decision Making:     ED Course as of 24 1225   Sun 2024   0909 CT brain attack head wo IV contrast  Per my view of the available axial image, there is no obvious intracranial bleed.  Encephalomalacia of the parietal region appears grossly unchanged compared to prior. [AB]   0915 Secure chat from radiologist without acute findings on CT of the head [AB]   0929 ECG 12 lead  My ECG interpretation: Normal sinus rhythm, heart rate 70, no ST segment elevation, subtle ST segment depressions in the inferior leads, T wave inversions in V1 and V2.  Largely unchanged from ECG 2022 [AB]   0930 No change in neurologic exam on repeat examination. [AB]   0956 Spoke with hospitalist.  Will touch base with stroke neurology at [facility] prior to admission to [facility]. [AB]   1000 Spoke with Neurology at [facility], Dr. Silah.  Agrees with plan to admit to [facility] and obtain MRI.  Wonders if the patient has missed any medications, as he is on a \"aggressive thrombotic regimen.\" [AB]      ED Course User Index  [AB] Bay Clements MD         Diagnoses as of 24 1225   LUE " weakness   History of stroke   History of dysarthria        EKG interpreted by myself: Normal sinus rhythm, heart 70, no ST segment ovation, QTc 425     Independent Interpretation of Studies: I independently interpreted the CT head and see No obvious evidence of intracranial hemorrhage     Discussion of Management with Other Providers:   I discussed the patient/results with: Neurology at [facility] as well as the admitting hospitalist at [facility], as further detailed in the ED course      IV Thrombolysis IV Thrombolysis Checklist        IV Thrombolysis Given: No; Thrombolysis contraindication reason: Neurologic signs are mild and judged to be non-disabling and Time from Last Known Well (or stroke onset) is >4.5 hours         Procedure  Critical Care    Performed by: Bay Clements MD  Authorized by: Bay Clements MD    Critical care provider statement:     Critical care time (minutes):  31    Critical care time was exclusive of:  Separately billable procedures and treating other patients    Critical care was necessary to treat or prevent imminent or life-threatening deterioration of the following conditions: Stroke symptoms/stroke activation.    Critical care was time spent personally by me on the following activities:  Blood draw for specimens, development of treatment plan with patient or surrogate, discussions with consultants, discussions with primary provider, obtaining history from patient or surrogate, examination of patient, ordering and performing treatments and interventions, ordering and review of laboratory studies, ordering and review of radiographic studies, pulse oximetry, review of old charts and re-evaluation of patient's condition    Care discussed with: admitting provider              Bay Clements MD  02/11/24 6310

## 2024-02-11 NOTE — CARE PLAN
The patient's goals for the shift include  to find reason for left arm weakness    The clinical goals for the shift include  comfort and safety

## 2024-02-11 NOTE — CARE PLAN
Problem: Pain  Goal: My pain/discomfort is manageable  2/11/2024 1849 by Amanda Behrend, RN  Outcome: Progressing  2/11/2024 1848 by Amanda Behrend, RN  Outcome: Progressing     Problem: Safety  Goal: I will remain free of falls  2/11/2024 1849 by Amanda Behrend, RN  Outcome: Progressing  2/11/2024 1848 by Amanda Behrend, RN  Outcome: Progressing   The patient's goals for the shift include  find cause of left arm weakness    The clinical goals for the shift include  comfort and safety

## 2024-02-12 ENCOUNTER — APPOINTMENT (OUTPATIENT)
Dept: RADIOLOGY | Facility: HOSPITAL | Age: 69
DRG: 065 | End: 2024-02-12
Payer: MEDICARE

## 2024-02-12 ENCOUNTER — APPOINTMENT (OUTPATIENT)
Dept: CARDIOLOGY | Facility: HOSPITAL | Age: 69
DRG: 065 | End: 2024-02-12
Payer: MEDICARE

## 2024-02-12 LAB
ALBUMIN SERPL BCP-MCNC: 4.1 G/DL (ref 3.4–5)
ALP SERPL-CCNC: 40 U/L (ref 33–136)
ALT SERPL W P-5'-P-CCNC: 16 U/L (ref 10–52)
ANION GAP SERPL CALC-SCNC: 10 MMOL/L (ref 10–20)
AORTIC VALVE MEAN GRADIENT: 9 MMHG
AORTIC VALVE PEAK VELOCITY: 2.01 M/S
AST SERPL W P-5'-P-CCNC: 13 U/L (ref 9–39)
ATRIAL RATE: 70 BPM
AV PEAK GRADIENT: 16.2 MMHG
AVA (PEAK VEL): 2.14 CM2
AVA (VTI): 2.02 CM2
BILIRUB SERPL-MCNC: 0.5 MG/DL (ref 0–1.2)
BUN SERPL-MCNC: 27 MG/DL (ref 6–23)
CALCIUM SERPL-MCNC: 9.4 MG/DL (ref 8.6–10.3)
CHLORIDE SERPL-SCNC: 105 MMOL/L (ref 98–107)
CO2 SERPL-SCNC: 28 MMOL/L (ref 21–32)
CREAT SERPL-MCNC: 1.25 MG/DL (ref 0.5–1.3)
EGFRCR SERPLBLD CKD-EPI 2021: 62 ML/MIN/1.73M*2
EJECTION FRACTION APICAL 4 CHAMBER: 46.6
ERYTHROCYTE [DISTWIDTH] IN BLOOD BY AUTOMATED COUNT: 12.5 % (ref 11.5–14.5)
EST. AVERAGE GLUCOSE BLD GHB EST-MCNC: 200 MG/DL
GLUCOSE BLD MANUAL STRIP-MCNC: 138 MG/DL (ref 74–99)
GLUCOSE BLD MANUAL STRIP-MCNC: 145 MG/DL (ref 74–99)
GLUCOSE BLD MANUAL STRIP-MCNC: 157 MG/DL (ref 74–99)
GLUCOSE BLD MANUAL STRIP-MCNC: 248 MG/DL (ref 74–99)
GLUCOSE SERPL-MCNC: 138 MG/DL (ref 74–99)
HBA1C MFR BLD: 8.6 %
HCT VFR BLD AUTO: 36.9 % (ref 41–52)
HGB BLD-MCNC: 12.6 G/DL (ref 13.5–17.5)
LEFT VENTRICLE INTERNAL DIMENSION DIASTOLE: 4.6 CM (ref 3.5–6)
LEFT VENTRICULAR OUTFLOW TRACT DIAMETER: 2.2 CM
MCH RBC QN AUTO: 31 PG (ref 26–34)
MCHC RBC AUTO-ENTMCNC: 34.1 G/DL (ref 32–36)
MCV RBC AUTO: 91 FL (ref 80–100)
MITRAL VALVE E/A RATIO: 0.96
NRBC BLD-RTO: 0 /100 WBCS (ref 0–0)
P AXIS: 62 DEGREES
P OFFSET: 171 MS
P ONSET: 118 MS
PLATELET # BLD AUTO: 262 X10*3/UL (ref 150–450)
POTASSIUM SERPL-SCNC: 4.2 MMOL/L (ref 3.5–5.3)
PR INTERVAL: 180 MS
PROT SERPL-MCNC: 6.3 G/DL (ref 6.4–8.2)
Q ONSET: 208 MS
QRS COUNT: 11 BEATS
QRS DURATION: 84 MS
QT INTERVAL: 394 MS
QTC CALCULATION(BAZETT): 425 MS
QTC FREDERICIA: 415 MS
R AXIS: -63 DEGREES
RBC # BLD AUTO: 4.06 X10*6/UL (ref 4.5–5.9)
RIGHT VENTRICLE FREE WALL PEAK S': 8.59 CM/S
SODIUM SERPL-SCNC: 139 MMOL/L (ref 136–145)
T AXIS: 50 DEGREES
T OFFSET: 405 MS
VENTRICULAR RATE: 70 BPM
WBC # BLD AUTO: 7.9 X10*3/UL (ref 4.4–11.3)

## 2024-02-12 PROCEDURE — 93306 TTE W/DOPPLER COMPLETE: CPT | Performed by: STUDENT IN AN ORGANIZED HEALTH CARE EDUCATION/TRAINING PROGRAM

## 2024-02-12 PROCEDURE — 2500000002 HC RX 250 W HCPCS SELF ADMINISTERED DRUGS (ALT 637 FOR MEDICARE OP, ALT 636 FOR OP/ED): Performed by: INTERNAL MEDICINE

## 2024-02-12 PROCEDURE — 36415 COLL VENOUS BLD VENIPUNCTURE: CPT

## 2024-02-12 PROCEDURE — 93306 TTE W/DOPPLER COMPLETE: CPT

## 2024-02-12 PROCEDURE — 92610 EVALUATE SWALLOWING FUNCTION: CPT | Mod: GN | Performed by: SPEECH-LANGUAGE PATHOLOGIST

## 2024-02-12 PROCEDURE — 70544 MR ANGIOGRAPHY HEAD W/O DYE: CPT

## 2024-02-12 PROCEDURE — 1200000002 HC GENERAL ROOM WITH TELEMETRY DAILY

## 2024-02-12 PROCEDURE — 2500000002 HC RX 250 W HCPCS SELF ADMINISTERED DRUGS (ALT 637 FOR MEDICARE OP, ALT 636 FOR OP/ED)

## 2024-02-12 PROCEDURE — 99232 SBSQ HOSP IP/OBS MODERATE 35: CPT

## 2024-02-12 PROCEDURE — 97161 PT EVAL LOW COMPLEX 20 MIN: CPT | Mod: GP

## 2024-02-12 PROCEDURE — 99223 1ST HOSP IP/OBS HIGH 75: CPT | Performed by: STUDENT IN AN ORGANIZED HEALTH CARE EDUCATION/TRAINING PROGRAM

## 2024-02-12 PROCEDURE — 97165 OT EVAL LOW COMPLEX 30 MIN: CPT | Mod: GO

## 2024-02-12 PROCEDURE — 82947 ASSAY GLUCOSE BLOOD QUANT: CPT

## 2024-02-12 PROCEDURE — 99221 1ST HOSP IP/OBS SF/LOW 40: CPT | Performed by: NURSE PRACTITIONER

## 2024-02-12 PROCEDURE — 70544 MR ANGIOGRAPHY HEAD W/O DYE: CPT | Performed by: STUDENT IN AN ORGANIZED HEALTH CARE EDUCATION/TRAINING PROGRAM

## 2024-02-12 PROCEDURE — 70551 MRI BRAIN STEM W/O DYE: CPT | Performed by: STUDENT IN AN ORGANIZED HEALTH CARE EDUCATION/TRAINING PROGRAM

## 2024-02-12 PROCEDURE — 2500000001 HC RX 250 WO HCPCS SELF ADMINISTERED DRUGS (ALT 637 FOR MEDICARE OP)

## 2024-02-12 PROCEDURE — 70551 MRI BRAIN STEM W/O DYE: CPT

## 2024-02-12 PROCEDURE — 85027 COMPLETE CBC AUTOMATED: CPT

## 2024-02-12 PROCEDURE — 80053 COMPREHEN METABOLIC PANEL: CPT

## 2024-02-12 PROCEDURE — 2500000004 HC RX 250 GENERAL PHARMACY W/ HCPCS (ALT 636 FOR OP/ED)

## 2024-02-12 RX ADMIN — METOPROLOL SUCCINATE 50 MG: 50 TABLET, EXTENDED RELEASE ORAL at 08:56

## 2024-02-12 RX ADMIN — FUROSEMIDE 40 MG: 40 TABLET ORAL at 08:56

## 2024-02-12 RX ADMIN — QUETIAPINE FUMARATE 12.5 MG: 25 TABLET ORAL at 20:46

## 2024-02-12 RX ADMIN — PERFLUTREN 3 ML OF DILUTION: 6.52 INJECTION, SUSPENSION INTRAVENOUS at 13:41

## 2024-02-12 RX ADMIN — CLOPIDOGREL BISULFATE 75 MG: 75 TABLET ORAL at 08:56

## 2024-02-12 RX ADMIN — LOSARTAN POTASSIUM 25 MG: 25 TABLET, FILM COATED ORAL at 08:56

## 2024-02-12 RX ADMIN — INSULIN LISPRO 2 UNITS: 100 INJECTION, SOLUTION INTRAVENOUS; SUBCUTANEOUS at 12:28

## 2024-02-12 RX ADMIN — TAMSULOSIN HYDROCHLORIDE 0.4 MG: 0.4 CAPSULE ORAL at 20:46

## 2024-02-12 RX ADMIN — RIVAROXABAN 20 MG: 20 TABLET, FILM COATED ORAL at 18:28

## 2024-02-12 RX ADMIN — ATORVASTATIN CALCIUM 80 MG: 80 TABLET, FILM COATED ORAL at 08:56

## 2024-02-12 RX ADMIN — FENOFIBRATE 160 MG: 160 TABLET ORAL at 08:56

## 2024-02-12 ASSESSMENT — COGNITIVE AND FUNCTIONAL STATUS - GENERAL
DAILY ACTIVITIY SCORE: 24
DAILY ACTIVITIY SCORE: 24
MOBILITY SCORE: 24
MOBILITY SCORE: 24

## 2024-02-12 ASSESSMENT — PAIN SCALES - GENERAL
PAINLEVEL_OUTOF10: 0 - NO PAIN
PAINLEVEL_OUTOF10: 0 - NO PAIN

## 2024-02-12 ASSESSMENT — PAIN - FUNCTIONAL ASSESSMENT
PAIN_FUNCTIONAL_ASSESSMENT: 0-10
PAIN_FUNCTIONAL_ASSESSMENT: 0-10

## 2024-02-12 NOTE — CARE PLAN
The patient's goals for the shift include      The clinical goals for the shift include patient will be hemodynamically stable through shift      Problem: Pain  Goal: My pain/discomfort is manageable  Outcome: Progressing     Problem: Safety  Goal: Patient will be injury free during hospitalization  Outcome: Progressing  Goal: I will remain free of falls  Outcome: Progressing     Problem: Daily Care  Goal: Daily care needs are met  Outcome: Progressing     Problem: Psychosocial Needs  Goal: Demonstrates ability to cope with hospitalization/illness  Outcome: Progressing  Goal: Collaborate with me, my family, and caregiver to identify my specific goals  Outcome: Progressing     Problem: Discharge Barriers  Goal: My discharge needs are met  Outcome: Progressing     Problem: Pain - Adult  Goal: Verbalizes/displays adequate comfort level or baseline comfort level  Outcome: Progressing     Problem: Safety - Adult  Goal: Free from fall injury  Outcome: Progressing     Problem: Discharge Planning  Goal: Discharge to home or other facility with appropriate resources  Outcome: Progressing     Problem: Chronic Conditions and Co-morbidities  Goal: Patient's chronic conditions and co-morbidity symptoms are monitored and maintained or improved  Outcome: Progressing     Problem: Fall/Injury  Goal: Not fall by end of shift  Outcome: Progressing  Goal: Be free from injury by end of the shift  Outcome: Progressing  Goal: Verbalize understanding of personal risk factors for fall in the hospital  Outcome: Progressing  Goal: Verbalize understanding of risk factor reduction measures to prevent injury from fall in the home  Outcome: Progressing  Goal: Use assistive devices by end of the shift  Outcome: Progressing  Goal: Pace activities to prevent fatigue by end of the shift  Outcome: Progressing     Problem: Diabetes  Goal: Achieve decreasing blood glucose levels by end of shift  Outcome: Progressing  Goal: Increase stability of blood  glucose readings by end of shift  Outcome: Progressing  Goal: Decrease in ketones present in urine by end of shift  Outcome: Progressing  Goal: Maintain electrolyte levels within acceptable range throughout shift  Outcome: Progressing  Goal: Maintain glucose levels >70mg/dl to <250mg/dl throughout shift  Outcome: Progressing  Goal: No changes in neurological exam by end of shift  Outcome: Progressing  Goal: Learn about and adhere to nutrition recommendations by end of shift  Outcome: Progressing  Goal: Vital signs within normal range for age by end of shift  Outcome: Progressing  Goal: Increase self care and/or family involovement by end of shift  Outcome: Progressing  Goal: Receive DSME education by end of shift  Outcome: Progressing

## 2024-02-12 NOTE — PROGRESS NOTES
"Speech-Language Pathology    Inpatient Speech-Language Pathology Clinical Swallow Evaluation    Patient Name: Isaiah Jin \"Arun"  MRN: [id number]  Today's Date: 2/12/2024   Time Calculation  Start Time: 0900  Stop Time: 0913  Time Calculation (min): 13 min     Current Problem:   1. LUE weakness        2. History of stroke        3. History of dysarthria        4. Other cardiomyopathy (CMS/HCC)  Transthoracic Echo (TTE) Complete    Transthoracic Echo (TTE) Complete        Recommendations:  Solid Diet Recommendations : Regular (IDDSI Level 7)  Liquid Diet Recommendations: Thin (IDDSI Level 0)  Compensatory Swallowing Strategies:   -Upright 90 degrees as possible for all oral intake   -Eat/feed slowly    Assessment:   Intact oral phase of the swallow, and suspected functional pharyngeal swallow given clinical bedside indicators.  Pt is managing secretions. Baseline mild left labio-facial weakness. Tongue protrudes midline, good lingual ROM noted.   ORAL PHASE: labial seal is intact on cup and straw; no labial loss of oral bolus despite mild left labio-facial weakness baseline.  Mastication of solids is effective, there is no significant oral residue after the swallow, no oral pocketing.   PHARYNGEAL PHASE: Laryngeal rise noted on palpation with swallow onset. No overt s/s aspiration with all consistencies trialed, including 3 oz water protocol, puree, solids. Pt can self feed, follows directions.  Pt denies dysphagia. No further assessment for swallowing at this time. Please re-consult if any changes in medical condition or mentation that would be a barrier to safe chewing/swallowing.      Plan:  SLP Plan: No skilled SLP  No Skilled SLP: At baseline function, No acute SLP goals identified  Discussed Risks/Benefits: Yes, Patient, Nursing  Patient/Caregiver Agreeable: Yes  SLP - OK to Discharge: Yes    Subjective   Pt seen bedside, he is upright, reports he ate 90% of breakfast, wife confirms. No concern for " chewing/swallowing.  MRI pending later today.  Admit d/t LUE weakness, stroke work-up.  Baseline mildly dysarthric speech, but intelligible in conversation. Pt is A&Ox4.      Per ED notes:  Isaiah Jin is a 68 y.o. with a past medical history that includes prior CVA with residual speech difficulties that is on Xarelto and Plavix presents with left arm numbness and weakness.  Awoke at 3 this morning was in his usual state of health.  Upon awakening later this morning, he noted left arm tingling.  Wife also noted that he was unable to use his left arm to pull out a chair, which is unusual for him.  Wife reported to EMS that the patient speech is currently at baseline.  On arrival, patient does have drift with his left upper extremity.  Speech continues to be at baseline per EMS.  No visual field deficits.  No aphasia.  No neglect.       CT Brain Attack Head wo IV contrast 2/11  IMPRESSION:  1. No acute intracranial pathology.  2. Remote infarcts right and left parieto-occipital regions.  3. Chronic paranasal sinusitis and chronic mild left mastoiditis.    MRI pending.       General Visit Information:  Patient Class: Inpatient  Living Environment: Home  Caregiver Feedback: doing well with meals/meds  Reason for Referral: assess oropharyngeal swallow  Prior Level of Function:  (left sided weakness/facial droop baseline from prior CVA in Nov 2023. Discharged from speech therapy for language and cognition.  Eating regular textures at home, no dysphagia.)  [employer] Diet: Regular textures, thin liquids at home  Current Diet : Regular textures, thin liquid orders in acute care.  Dysphagia Diagnosis: Within functional limits (mild left labial-facial weakness.)    Pain Assessment: 0-10  Pain Score: 0 - No pain     Oral/Motor Assessment:  Dentition: Adequate/Natural  Oral Motor: Within Functional Limits  Labial ROM: Within Functional Limits  Labial Symmetry: Abnormal symmetry left (mild)  Lingual ROM: Within Functional  Limits  Vocal Quality: Within Functional Limits  Intelligibility: Intelligible (very mild dysarthric speech, intelligible in conversation.)  Breath Support: Adequate for speech    Clinical Observations:  Patient Positioning: Upright in Bed  Management of Oral Secretions: Adequate  Suck Swallow Breathe Coordination: Functional  Was The 3 oz Swallow Protocol Completed: Yes    Inpatient:  Education Documentation  SLP has provided pt and caregiver/RN with the results and recommendations of this session.

## 2024-02-12 NOTE — PROGRESS NOTES
Subjective   Patient sitting comfortably in bed. Somewhat aphasic. Denies numbness, tingling, loss of balance, dizziness.       Objective     Last Recorded Vitals  /85   Pulse 65   Temp 36.2 °C (97.2 °F)   Resp 20   Wt 80.3 kg (177 lb)   SpO2 92%   Intake/Output last 3 Shifts:    Intake/Output Summary (Last 24 hours) at 2/12/2024 1344  Last data filed at 2/12/2024 1226  Gross per 24 hour   Intake 520 ml   Output 400 ml   Net 120 ml       Admission Weight  Weight: 80.5 kg (177 lb 7.5 oz) (02/11/24 0929)    Daily Weight  02/11/24 : 80.3 kg (177 lb)    Image Results  ECG 12 lead  Normal sinus rhythm  Left anterior fascicular block  Anterolateral infarct (cited on or before 08-SEP-2022)  Abnormal ECG  When compared with ECG of 08-SEP-2022 05:20,  Left anterior fascicular block is now Present  Criteria for Inferior infarct are no longer Present  T wave inversion less evident in Anterolateral leads      Physical Exam  Constitutional:       Appearance: Normal appearance.   HENT:      Head: Normocephalic and atraumatic.      Mouth/Throat:      Mouth: Mucous membranes are moist.   Eyes:      Extraocular Movements: Extraocular movements intact.   Cardiovascular:      Rate and Rhythm: Normal rate and regular rhythm.   Pulmonary:      Effort: Pulmonary effort is normal.      Breath sounds: Normal breath sounds.   Abdominal:      General: Abdomen is flat. There is no distension.      Palpations: Abdomen is soft.   Musculoskeletal:      Right lower leg: No edema.      Left lower leg: No edema.   Skin:     General: Skin is warm and dry.   Neurological:      Mental Status: He is alert and oriented to person, place, and time.      Cranial Nerves: Dysarthria present.      Comments: 4/5 strength in left arm abduction, left elbow flexion, and  strength.   Psychiatric:         Mood and Affect: Mood normal.            Assessment/Plan     LARY WOODALL is a 68 year old male with a history of CAD s/p CABG, AVR, CVA  2022, HLD, HTN, and DM II who presents with left upper extremity weakness. He was admitted with concerns for a stroke.    2/12: Vital signs stable overnight. MRI, MRA, and echo pending. Passed swallow eval. AM-PAC 24.     #CVA vs encephalopathy  #Hx CVA 2022  -Continue home Plavix and Xarelto  -Lipitor 80 mg daily  -MRI/MRA pending  -Echo pending  -Continue neurological assessments  -Neurology consulted      #CAD s/p CABG  #Aortic stenosis s/p AVR  -Continue Plavix, lasix, and metoprolol    #DMII  Home regimen: 14 units Lantus with sliding scale  -Continue 14 units Lantus with mealtime sliding scale    #HTN  -Continue home losartan    #HLD  -Continue home atorvastatin    This is a preliminary note, please await attending attestation for a finalized plan.    Dr. Braulio Blanco  Internal Medicine PGY-2  Please message me with any questions

## 2024-02-12 NOTE — PROGRESS NOTES
02/12/24 1435   Discharge Planning   Living Arrangements Spouse/significant other   Support Systems Spouse/significant other   Type of Residence Private residence   Who is requesting discharge planning? Provider   Home or Post Acute Services None   Patient expects to be discharged to: home   Financial Resource Strain   How hard is it for you to pay for the very basics like food, housing, medical care, and heating? Not very     Met with pt, pt admit for stroke like s/s.  Awaiting MRI.  Pt's baseline is ambulatory, has a walker at home if need.   Await therapy.  Pt denies any needs at this time.  Home address and insurnace verified.  Karlee De La Garza RN [facility]

## 2024-02-12 NOTE — CONSULTS
Inpatient consult to Neurology  Consult performed by: MAXIMILIANO Quinn-CNP  Consult ordered by: Bay Clements MD          Neurology consult    Reason For Consult: TIA.     History Of Present Illness  This is a 68 y.o. male with underlying hypertension, dyslipidemia, diabetes type II on insulin, CAD s/p CABG 2020, AVR, and hx of CVA 2022 (residual speech difficulties) who presented to [facility] ED c/o left upper extremity weakness.  Patient does have a history of stroke in 2022 and does have some remaining deficits from that time including left upper extremity weakness, left facial droop, dysarthria, and left peripheral visual field defects.  Patient states that he was then bathroom this morning and kept dropping his toothbrush.  The wife also states when he came out to the kitchen he was unable to pull out his chair from under the table.  Patient does follow with neurology as an outpatient since his stroke and is currently on Plavix, Xarelto, and high-dose statin.  Patient denied any chest pain, palpitations, numbness or tingling in extremities, visual changes, headache.  Patient was then transported to the ED.  On arrival /76, HR 63, satting 95% on room air.  CT head showed no acute intracranial pathology and remote infarcts and left parietal occipital lobe regions.  Initial labs grossly WNL with the exception of hyperglycemia to 10.  Patient to be admitted for stroke workup.     During our assessment patient was AOx3. Patient was able to follow commands. He stated that his left sided weakness improved. Denied headaches, SOB, chest pain, abdomen pain, difficulty to urinate, and bloody stools. MRI brain and new echocardiogram- pending.      Past Medical History  As above.    Surgical History  As above.     Social History  He reports that he has never smoked. He has never been exposed to tobacco smoke. He has never used smokeless tobacco. He reports that he does not currently use alcohol. He reports  that he does not use drugs.    Family History  Family History   Problem Relation Name Age of Onset    Coronary artery disease Father      No Known Problems Sibling       Allergies  Patient has no known allergies.    Review of Systems  A 12 point review of systems was performed and all systems were negative/normal except what is noted in the HPI. Please refer to the HPI for details.     Physical Exam  Constitutional: alert and oriented x3, no acute distress.  Eyes: PERRL, EOMI, clear sclera.  Head/Neck: Neck supple, no apparent injury, No JVD, no bruits.  Respiratory: clear breath sounds, no wheezes or rhonchi.   Cardiovascular: regular rate and rhythm, no murmurs.   Gastrointestinal: soft abdomen, non-tender, no rebound.  Genitourinary: no taylor.  Musculoskeletal: no joint swelling.   Extremities: no LE edema.  Skin: warm and dry, no rashes.    Neurological Exam  Mental Status: Awake, alert and oriented to person, place and time. Residual Speech difficulties noted. Follows complex commands. Attention and concentration are normal.      Cranial Nerves  CN II: Bilateral hemianopsia.  CN III, IV, VI: Extraocular movements intact bilaterally. Normal lids and orbits bilaterally.  CN V: Facial sensation is normal.  CN VII: Full and symmetric facial movement.  CN VIII: Hearing is normal.  CN IX, X: Palate elevates symmetrically  CN XI: Shoulder shrug strength is normal.  CN XII: Tongue midline without atrophy or fasciculations.     Motor  Normal muscle bulk throughout. No fasciculations present. Normal muscle tone. No abnormal involuntary movements. Strength is decreased ( 5 - ) left hand .      Sensory  Normal sensory throughout all four extremities.      Coordination  Finger-to-nose, rapid alternating movements without dysmetria.   Right Heel-to-shin with mild ataxia.         Last Recorded Vitals  /85   Pulse 65   Temp 36.2 °C (97.2 °F)   Resp 20   Wt 80.3 kg (177 lb)   SpO2 92%     Relevant Results    CT  brain attack head wo IV contrast   IMPRESSION:  1. No acute intracranial pathology.  2. Remote infarcts right and left parieto-occipital regions.  3. Chronic paranasal sinusitis and chronic mild left mastoiditis.     Assessment/Plan     Assessment & Plan Neurology:    #Possible new CVA vs TIA  -Physical exam with changes as above.  -Reviewed CT brain images.   -Continue with home meds: plavix, xarelto, and statin.   -New echocardiogram pending.   -MRI brain pending, if new acute changes will consider add aspirin.         Lazara Ko MD

## 2024-02-12 NOTE — PROGRESS NOTES
"Occupational Therapy    Evaluation    Patient Name: Isaiah Jin \"Arun"  MRN: [id number]  Today's Date: 2/12/2024  Time Calculation  Start Time: 1013  Stop Time: 1024  Time Calculation (min): 11 min        Assessment:  End of Session Patient Position: Bed, 2 rail up, Alarm off, not on at start of session     Plan:  Treatment Interventions: UE strengthening/ROM, Neuromuscular reeducation  OT Frequency: 1 time per week  OT Discharge Recommendations:  (outpatient if LUE deficits persist)  OT - OK to Discharge: Yes (to next level of care when cleared by medical team)  Treatment Interventions: UE strengthening/ROM, Neuromuscular reeducation    Subjective   Current Problem:  1. LUE weakness        2. History of stroke        3. History of dysarthria        4. Other cardiomyopathy (CMS/HCC)  Transthoracic Echo (TTE) Complete    Transthoracic Echo (TTE) Complete        General:  General  Reason for Referral: impaired adl  Referred By: Malena  Past Medical History Relevant to Rehab: pt. admitted with LUE numbness/weakness, dx:  TIA  pmh:  cva 2022 with residual speech difficulty and dysarthria, dmII, cad, cabg, avr, hld, htn  Prior to Session Communication: Bedside nurse  Patient Position Received: Bed, 2 rail up, Alarm off, not on at start of session  General Comment: pt. agreeable to therapy intervention  Precautions:  Precautions Comment: falls  Vital Signs:     Pain:       Objective   Cognition:  Overall Cognitive Status: Within Functional Limits           Home Living:  Home Living Comments: pt. lives with spouse and has 2 daughters who work; 1 step without rail for entry to home, pt. has 1st floor set up, tub/shower with grab bar, hhs, seat, RTS, standard bed  Prior Function:  Hand Dominance: Right  Prior Function Comments: independent with adl/mobility without use of device, family assists with iadl tasks  IADL History:     ADL:  ADL Comments: pt. able to don/dof sock seated eob, demonstrates sufficient " balance/mobility to complete standing aspects of adl tasks at supervision level  Activity Tolerance:     Bed Mobility/Transfers: Bed Mobility  Bed Mobility:  (independent supine <> sit)    Transfers  Transfer:  (independent sit<> stand from eob)      Ambulation/Gait Training:  Ambulation/Gait Training  Ambulation/Gait Training Performed:  (mobility within room supervision, some weakness R knee due to oa/wears a brace)  Strength:   Sensation Comment: RUE;  strength wfl, LUE:   4+/5, elbow 4/5, shoulder 4+/5  Sensation:  wfl     Coordination:  Coordination Comment: slower/less accurate opposition L hand     Outcome Measures:[facility] Daily Activity  Putting on and taking off regular lower body clothing: None  Bathing (including washing, rinsing, drying): None  Putting on and taking off regular upper body clothing: None  Toileting, which includes using toilet, bedpan or urinal: None  Taking care of personal grooming such as brushing teeth: None  Eating Meals: None  Daily Activity - Total Score: 24        Education Documentation  Home Exercise Program, taught by Shasha Jefferson OT at 2/12/2024  4:42 PM.  Learner: Patient  Readiness: Acceptance  Method: Explanation  Response: Verbalizes Understanding, Needs Reinforcement    Education Comments  No comments found.        OP EDUCATION:       Goals:  Encounter Problems       Encounter Problems (Active)       OT Goals       increase LUE strength/coordination for assist with fm/gm aspects of adl tasks (Progressing)       Start:  02/12/24    Expected End:  02/19/24

## 2024-02-12 NOTE — PROGRESS NOTES
"Physical Therapy    Physical Therapy Evaluation    Patient Name: Isaiah Jin \"Arun"  MRN: [id number]  Today's Date: 2/12/2024   Time Calculation  Start Time: 1012  Stop Time: 1021  Time Calculation (min): 9 min    Assessment/Plan   PT Assessment  Evaluation/Treatment Tolerance: Patient tolerated treatment well  End of Session Patient Position: Bed, 2 rail up (call light in reach)  IP OR SWING BED PT PLAN  Inpatient or Swing Bed: Inpatient  PT Plan  PT Plan: PT Eval only  PT Eval Only Reason: Only single session needed  PT Discharge Recommendations: No further acute PT  PT - OK to Discharge: Yes (when cleared by medical team)    Subjective     Current Problem:  Patient Active Problem List   Diagnosis    Aortic stenosis    Aphasia, post-stroke    Apraxia    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    CAD (coronary artery disease)    Cardioembolic stroke (CMS/HCC)    Cardiomyopathy, unspecified (CMS/HCC)    Cerebrovascular accident (CVA) (CMS/HCC)    Cognitive communication deficit    Syncope, convulsive    Decreased coordination    Type 2 diabetes mellitus without complications (CMS/HCC)    Dysarthria due to recent stroke    Essential (primary) hypertension    H/O: stroke with residual effects    Screening for prostate cancer    Dysarthria and anarthria    Wound of right leg    Visual field loss    Thoracic aortic aneurysm (CMS/HCC)    S/P CABG x 4    S/P AVR (aortic valve replacement)    S/P ascending aortic replacement    Right knee pain    Right hemisphere, cerebral infarction (CMS/HCC)    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, unspecified    Mixed hyperlipidemia    Hyperlipidemia, unspecified    Mitral valve regurgitation    Metabolic encephalopathy    Dysphagia following cerebral infarction    Acute pulmonary edema (CMS/HCC)    Heart failure (CMS/HCC)    Benign prostatic hyperplasia without lower urinary tract symptoms    Hemiplegia and hemiparesis following cerebral infarction " affecting left non-dominant side (CMS/HCC)    Dependence on renal dialysis (CMS/HCC)    Convulsions, unspecified convulsion type (CMS/HCC)    Facial weakness    Impaired cognition    Primary insomnia    Unresponsive    Benign prostatic hyperplasia    Cardiomyopathy (CMS/HCC)    Disorder of brain    Seizure (CMS/HCC)    Dysphagia    Primary hypertension    Lipoma of extremity    Mood disorder due to a general medical condition    Osteoarthritis of right knee    Knee pain    History of open heart surgery    Syncope and collapse    Thoracic aortic aneurysm (TAA) (CMS/HCC)    Type 2 diabetes mellitus without complication (CMS/HCC)    Visual field defect    Injury of lower extremity    LUE weakness       General Visit Information:  General  Reason for Referral: PT eval & treat/impaired mobility DX: tia (2/11/24 lue numbness/weakness; ct head (-). mri/mra ordered)  Referred By: Malena  Caregiver Feedback: Per conference w/ RN patient stable to participate in therapy  Co-Treatment: OT  Co-Treatment Reason: to maximize pt safety & mobility  General Comment: Pleasant & cooperative, receptive to mobility& instructions; dysarthric speech, pt notes lue function has returned to base line    Home Living:  Home Living  Home Living Comments: lives w/ spouse who is home & 2 daughters who work; 1 steps w/o rail to enter [employer] home, patient has 1st fl set up; tub shower w/ grab bar, hand held shower hose, seat; raised toilet; standard bed    Prior Level of Function:  Prior Function Per Pt/Caregiver Report  Hand Dominance: Right  Prior Function Comments: independent mobilty w/o use of device, no h/o falls; ind adl, family manages iadl & driving    Precautions:  Precautions  Precautions Comment: fall    Vital Signs:     Objective     Pain:  Pain Assessment  Pain Assessment:  (0/10)    Cognition:  Cognition  Overall Cognitive Status:  (0x4)    General Assessments:         Sensation  Sensation Comment: denies numbness/tingling         Coordination  Coordination Comment: ankle chanelle wfl   Functional Assessments:     Bed Mobility  Bed Mobility:  (independent supine<>sit)  Transfers  Transfer:  (independent sit<>stand w/o device)  Ambulation/Gait Training  Ambulation/Gait Training Performed:  (independent w/o device ~50ft total walking in room, self maintains balance w/ changes in direction, maneuvering obstacles & w/ turning head side to side)      Extremity/Trunk Assessments:        RLE   RLE :  (arom & strength wfl)  LLE   LLE :  (arom & strength wfl)    Outcome Measures:  [facility] Basic Mobility  Turning from your back to your side while in a flat bed without using bedrails: None  Moving from lying on your back to sitting on the side of a flat bed without using bedrails: None  Moving to and from bed to chair (including a wheelchair): None  Standing up from a chair using your arms (e.g. wheelchair or bedside chair): None  To walk in hospital room: None  Climbing 3-5 steps with railing: None  Basic Mobility - Total Score: 24           Education Documentation  Mobility Training, taught by Priscilla Murphy PT at 2/12/2024 12:02 PM.  Learner: Patient  Readiness: Acceptance  Method: Explanation  Response: Verbalizes Understanding

## 2024-02-13 ENCOUNTER — APPOINTMENT (OUTPATIENT)
Dept: CARDIOLOGY | Facility: HOSPITAL | Age: 69
DRG: 065 | End: 2024-02-13
Payer: MEDICARE

## 2024-02-13 VITALS
HEIGHT: 66 IN | SYSTOLIC BLOOD PRESSURE: 130 MMHG | OXYGEN SATURATION: 91 % | BODY MASS INDEX: 28.45 KG/M2 | HEART RATE: 69 BPM | TEMPERATURE: 97.5 F | RESPIRATION RATE: 14 BRPM | WEIGHT: 177 LBS | DIASTOLIC BLOOD PRESSURE: 75 MMHG

## 2024-02-13 PROBLEM — I63.9 ACUTE CVA (CEREBROVASCULAR ACCIDENT) (MULTI): Status: ACTIVE | Noted: 2024-02-13

## 2024-02-13 LAB
ALBUMIN SERPL BCP-MCNC: 4.3 G/DL (ref 3.4–5)
ANION GAP SERPL CALC-SCNC: 13 MMOL/L (ref 10–20)
BUN SERPL-MCNC: 25 MG/DL (ref 6–23)
CALCIUM SERPL-MCNC: 9.7 MG/DL (ref 8.6–10.3)
CHLORIDE SERPL-SCNC: 103 MMOL/L (ref 98–107)
CO2 SERPL-SCNC: 27 MMOL/L (ref 21–32)
CREAT SERPL-MCNC: 1.46 MG/DL (ref 0.5–1.3)
EGFRCR SERPLBLD CKD-EPI 2021: 52 ML/MIN/1.73M*2
ERYTHROCYTE [DISTWIDTH] IN BLOOD BY AUTOMATED COUNT: 12.4 % (ref 11.5–14.5)
GLUCOSE BLD MANUAL STRIP-MCNC: 144 MG/DL (ref 74–99)
GLUCOSE BLD MANUAL STRIP-MCNC: 230 MG/DL (ref 74–99)
GLUCOSE SERPL-MCNC: 143 MG/DL (ref 74–99)
HCT VFR BLD AUTO: 38.7 % (ref 41–52)
HGB BLD-MCNC: 13 G/DL (ref 13.5–17.5)
MAGNESIUM SERPL-MCNC: 2.36 MG/DL (ref 1.6–2.4)
MCH RBC QN AUTO: 30.4 PG (ref 26–34)
MCHC RBC AUTO-ENTMCNC: 33.6 G/DL (ref 32–36)
MCV RBC AUTO: 90 FL (ref 80–100)
NRBC BLD-RTO: 0 /100 WBCS (ref 0–0)
PHOSPHATE SERPL-MCNC: 4 MG/DL (ref 2.5–4.9)
PLATELET # BLD AUTO: 270 X10*3/UL (ref 150–450)
POTASSIUM SERPL-SCNC: 4.1 MMOL/L (ref 3.5–5.3)
RBC # BLD AUTO: 4.28 X10*6/UL (ref 4.5–5.9)
SODIUM SERPL-SCNC: 139 MMOL/L (ref 136–145)
WBC # BLD AUTO: 8.3 X10*3/UL (ref 4.4–11.3)

## 2024-02-13 PROCEDURE — 2500000002 HC RX 250 W HCPCS SELF ADMINISTERED DRUGS (ALT 637 FOR MEDICARE OP, ALT 636 FOR OP/ED): Performed by: INTERNAL MEDICINE

## 2024-02-13 PROCEDURE — 82947 ASSAY GLUCOSE BLOOD QUANT: CPT

## 2024-02-13 PROCEDURE — 36415 COLL VENOUS BLD VENIPUNCTURE: CPT

## 2024-02-13 PROCEDURE — 2500000001 HC RX 250 WO HCPCS SELF ADMINISTERED DRUGS (ALT 637 FOR MEDICARE OP): Performed by: NURSE PRACTITIONER

## 2024-02-13 PROCEDURE — 83735 ASSAY OF MAGNESIUM: CPT

## 2024-02-13 PROCEDURE — 2500000001 HC RX 250 WO HCPCS SELF ADMINISTERED DRUGS (ALT 637 FOR MEDICARE OP)

## 2024-02-13 PROCEDURE — 80069 RENAL FUNCTION PANEL: CPT

## 2024-02-13 PROCEDURE — 85027 COMPLETE CBC AUTOMATED: CPT

## 2024-02-13 PROCEDURE — 93270 REMOTE 30 DAY ECG REV/REPORT: CPT

## 2024-02-13 PROCEDURE — 99233 SBSQ HOSP IP/OBS HIGH 50: CPT | Performed by: NURSE PRACTITIONER

## 2024-02-13 PROCEDURE — 99239 HOSP IP/OBS DSCHRG MGMT >30: CPT | Performed by: INTERNAL MEDICINE

## 2024-02-13 PROCEDURE — 93272 ECG/REVIEW INTERPRET ONLY: CPT | Performed by: INTERNAL MEDICINE

## 2024-02-13 RX ORDER — ASPIRIN 81 MG/1
81 TABLET ORAL DAILY
Qty: 63 TABLET | Refills: 0 | Status: SHIPPED | OUTPATIENT
Start: 2024-02-14 | End: 2024-03-25 | Stop reason: WASHOUT

## 2024-02-13 RX ORDER — ASPIRIN 81 MG/1
81 TABLET ORAL DAILY
Status: DISCONTINUED | OUTPATIENT
Start: 2024-02-13 | End: 2024-02-13 | Stop reason: HOSPADM

## 2024-02-13 RX ADMIN — METOPROLOL SUCCINATE 50 MG: 50 TABLET, EXTENDED RELEASE ORAL at 09:17

## 2024-02-13 RX ADMIN — LOSARTAN POTASSIUM 25 MG: 25 TABLET, FILM COATED ORAL at 09:17

## 2024-02-13 RX ADMIN — ATORVASTATIN CALCIUM 80 MG: 80 TABLET, FILM COATED ORAL at 09:17

## 2024-02-13 RX ADMIN — FENOFIBRATE 160 MG: 160 TABLET ORAL at 09:17

## 2024-02-13 RX ADMIN — CLOPIDOGREL BISULFATE 75 MG: 75 TABLET ORAL at 09:17

## 2024-02-13 RX ADMIN — ASPIRIN 81 MG: 81 TABLET, COATED ORAL at 12:25

## 2024-02-13 RX ADMIN — FUROSEMIDE 40 MG: 40 TABLET ORAL at 09:17

## 2024-02-13 RX ADMIN — INSULIN LISPRO 4 UNITS: 100 INJECTION, SOLUTION INTRAVENOUS; SUBCUTANEOUS at 12:16

## 2024-02-13 ASSESSMENT — PAIN SCALES - GENERAL: PAINLEVEL_OUTOF10: 0 - NO PAIN

## 2024-02-13 NOTE — PROGRESS NOTES
"Duane Jin is a 68 y.o. male on day 2 of admission presenting with LUE weakness.    Subjective   MRI of the brain without contrast does indicate a acute to subacute left frontal lobe CVA.  Cardiogram noting suboptimal ejection fraction of 45% with evidence of an aortic valve replacement.  Patient to add ASA 81 mg p.o. daily to already established Xarelto plus Plavix regimen for CVA prophylaxis.  Neurology to sign off on care.       Objective     Last Recorded Vitals  Blood pressure 130/75, pulse 69, temperature 36.4 °C (97.5 °F), resp. rate 14, height 1.676 m (5' 5.98\"), weight 80.3 kg (177 lb), SpO2 91 %.  Physical exam/neurological exam    Relevant Results    Mental Status: Awake, alert and oriented to person, place and time. Residual Speech difficulties noted. Follows complex commands. Attention and concentration are normal.       Cranial Nerves  CN II: Bilateral hemianopsia.  CN III, IV, VI: Extraocular movements intact bilaterally. Normal lids and orbits bilaterally.  CN V: Facial sensation is normal.  CN VII: Full and symmetric facial movement.  CN VIII: Hearing is normal.  CN IX, X: Palate elevates symmetrically  CN XI: Shoulder shrug strength is normal.  CN XII: Tongue midline without atrophy or fasciculations.     Motor  Normal muscle bulk throughout. No fasciculations present. Normal muscle tone. No abnormal involuntary movements. Strength is decreased ( 5 - ) left hand .      Sensory  Normal sensory throughout all four extremities.      Coordination  Finger-to-nose, rapid alternating movements without dysmetria.   Right Heel-to-shin with mild ataxia.            [place] Coma Scale  Best Eye Response: Spontaneous  Best Verbal Response: Oriented  Best Motor Response: Follows commands  [place] Coma Scale Score: 15        NIH Stroke Scale: 4    MR brain wo IV contrast    Result Date: 2/12/2024  Interpreted By:  Clyde Ventura, STUDY: MR BRAIN WO IV CONTRAST; MR ANGIO HEAD WO IV CONTRAST;  " 2/12/2024 7:35 pm   INDICATION: Signs/Symptoms:stroke; Signs/Symptoms:stroke - chronic aphasia old stroke.  new left motor weakness. Stroke protocol.   COMPARISON: CT head w/o contrast dated 02/11/2024; MRI brain 09/06/2022   ACCESSION NUMBER(S): [id number]; [id number]   ORDERING CLINICIAN: NEGIN GIBSON   TECHNIQUE: Multiplanar, multi-sequence images of the brain were obtained without contrast.   3D time-of-flight MR angiography of the head was performed. The images were reviewed as source images and maximum intensity projections.   FINDINGS: MRI BRAIN:   Diffusion weighted images show a single punctate focus of DWI hyperintensity in the subcortical white matter of the posterolateral left frontal lobe (image 64/80, series 6) that is not well characterized on the ADC map, where there is no significant signal change. Otherwise, there is no evidence of acute or subacute ischemic change.   There is redemonstration of chronic infarcts in the right parietal lobe and bilateral occipital lobes. There is redemonstration of chronic lacunar infarcts within the bilateral cerebellar hemispheres, some of which are new from 09/06/2022; for example, 1 of the new (chronic) lacunar infarcts in the inferolateral left cerebellar hemisphere (axial T2WI 9/40, series 8). There are a couple punctate foci of hemosiderin deposition in the anterior right frontal and posterior left frontal white matter likely representing chronic microhemorrhages. Mild periventricular and subcortical hemispheric T2/FLAIR white matter hyperintensities are most compatible with chronic small vessel ischemic disease.   There is no acute intraparenchymal hemorrhage, mass, mass-effect, or an extra-axial fluid collection.   The ventricular size and cerebral volume are age-concordant.   Normal morphology of midline structures. The craniovertebral junction is normal.   The orbits and globes are unremarkable.   The paranasal sinuses show no air-fluid levels or  hemorrhage. Mucous retention cysts in the maxillary sinuses with mild macro mucosal thickening. Mild mucosal thickening in the ethmoid air cells.   The mastoid air cells are clear.     MRA HEAD: The left vertebral artery is non dominant. There is a fetal type left posterior cerebral artery. There is no hemodynamically significant intracranial stenosis or large vessel occlusion. There is no intracranial aneurysm.       MRI BRAIN: 1. Single punctate focus of DWI hyperintensity in the left frontal lobe white matter that is too small to characterize on ADC map, which does not demonstrate signal changes, therefore either represents an acute or subacute ischemic infarct. 2. Numerous chronic lacunar infarcts in the bilateral cerebellar hemispheres, multiple of which are new from 09/06/2022. 3. Redemonstration chronic infarcts in the bilateral occipital lobes and right parietal lobe. 4. Redemonstration of two foci of chronic microhemorrhage in the right and left frontal lobe.   MRA HEAD: 1. No evidence of major vessel occlusion or significant stenosis on MRA head.   MACRO: None.   Signed by: Clyde Ventura 2/12/2024 7:59 PM Dictation workstation:   [id number]    MR angio head wo IV contrast    Result Date: 2/12/2024  Interpreted By:  Clyde Ventura, STUDY: MR BRAIN WO IV CONTRAST; MR ANGIO HEAD WO IV CONTRAST;  2/12/2024 7:35 pm   INDICATION: Signs/Symptoms:stroke; Signs/Symptoms:stroke - chronic aphasia old stroke.  new left motor weakness. Stroke protocol.   COMPARISON: CT head w/o contrast dated 02/11/2024; MRI brain 09/06/2022   ACCESSION NUMBER(S): [id number]; [id number]   ORDERING CLINICIAN: NEGIN GIBSON   TECHNIQUE: Multiplanar, multi-sequence images of the brain were obtained without contrast.   3D time-of-flight MR angiography of the head was performed. The images were reviewed as source images and maximum intensity projections.   FINDINGS: MRI BRAIN:   Diffusion weighted images show a single  punctate focus of DWI hyperintensity in the subcortical white matter of the posterolateral left frontal lobe (image 64/80, series 6) that is not well characterized on the ADC map, where there is no significant signal change. Otherwise, there is no evidence of acute or subacute ischemic change.   There is redemonstration of chronic infarcts in the right parietal lobe and bilateral occipital lobes. There is redemonstration of chronic lacunar infarcts within the bilateral cerebellar hemispheres, some of which are new from 09/06/2022; for example, 1 of the new (chronic) lacunar infarcts in the inferolateral left cerebellar hemisphere (axial T2WI 9/40, series 8). There are a couple punctate foci of hemosiderin deposition in the anterior right frontal and posterior left frontal white matter likely representing chronic microhemorrhages. Mild periventricular and subcortical hemispheric T2/FLAIR white matter hyperintensities are most compatible with chronic small vessel ischemic disease.   There is no acute intraparenchymal hemorrhage, mass, mass-effect, or an extra-axial fluid collection.   The ventricular size and cerebral volume are age-concordant.   Normal morphology of midline structures. The craniovertebral junction is normal.   The orbits and globes are unremarkable.   The paranasal sinuses show no air-fluid levels or hemorrhage. Mucous retention cysts in the maxillary sinuses with mild macro mucosal thickening. Mild mucosal thickening in the ethmoid air cells.   The mastoid air cells are clear.     MRA HEAD: The left vertebral artery is non dominant. There is a fetal type left posterior cerebral artery. There is no hemodynamically significant intracranial stenosis or large vessel occlusion. There is no intracranial aneurysm.       MRI BRAIN: 1. Single punctate focus of DWI hyperintensity in the left frontal lobe white matter that is too small to characterize on ADC map, which does not demonstrate signal changes,  therefore either represents an acute or subacute ischemic infarct. 2. Numerous chronic lacunar infarcts in the bilateral cerebellar hemispheres, multiple of which are new from 09/06/2022. 3. Redemonstration chronic infarcts in the bilateral occipital lobes and right parietal lobe. 4. Redemonstration of two foci of chronic microhemorrhage in the right and left frontal lobe.   MRA HEAD: 1. No evidence of major vessel occlusion or significant stenosis on MRA head.   MACRO: None.   Signed by: Clyde Ventura 2/12/2024 7:59 PM Dictation workstation:   [id number]    Transthoracic Echo (TTE) Complete    Result Date: 2/12/2024    [facility], [address] [place] [id number] [phone] and                                 Fax [phone] TRANSTHORACIC ECHOCARDIOGRAM REPORT  Patient Name:      LARY WOODALL   Reading Physician:    [id number] Zeferino Munson MD Study Date:        2/12/2024            Ordering Provider:    [id number] LETICIA GARCÍA MRN/PID:           [id number]             Fellow: Accession#:        [id number]         Nurse:                Afia Colorado RN Date of Birth/Age: 1955 / 68 years Sonographer:          Kwabena Ragsdale RDCS Gender:            M                    Additional Staff: Height:            167.64 cm            Admit Date:           2/11/2024 Weight:            80.29 kg             Admission Status:     Inpatient -                                                               Routine BSA:               1.90 m2              Encounter#:           [id number]                                         Department Location:  [facility] Blood Pressure: 137 /85 mmHg Study Type:    TRANSTHORACIC ECHO (TTE) COMPLETE Diagnosis/ICD: Other cardiomyopathies-I42.8 CPT Code:      Echo Complete w Full  "[id number] Patient History: CABG:              Mulitivessel CABG. Valve Disorders:   Mitral Regurgitation. Diabetes:          Yes Pertinent History: CAD, Cardiomyopathy, CVA, HTN, Hyperlipidemia and CHF. AVR. Study Detail: The following Echo studies were performed: 2D, M-Mode, Doppler and               color flow. Technically challenging study due to body habitus and               patient lying in supine position. Definity used as a contrast               agent for endocardial border definition. Total contrast used for               this procedure was 3 mL via IV push.  PHYSICIAN INTERPRETATION: Left Ventricle: The left ventricular systolic function is mildly decreased, with an estimated ejection fraction of 45%. There are multiple wall motion abnormalities. The left ventricular cavity size is normal. There is mild to moderate concentric left ventricular hypertrophy. Abnormal (paradoxical) septal motion consistent with post-operative status. Left ventricular diastolic filling was indeterminate. There is no definite left ventricular thrombus visualized. The distal septal, inferior, and lateral segments are akinetic; LV apex is dyskinetic (similar to prior study). No LV thrombus on echo-contrast study; distal LV apical \"smoke\" consistent with flow stasis. Left Atrium: The left atrium is normal in size. Right Ventricle: The right ventricle was not well visualized. There is reduced right ventricular systolic function. Right Atrium: The right atrium was not well visualized. Aortic Valve: There is a prosthetic aortic valve present. Echo findings are consistent with normal aortic valve prosthesis structure and function. There is no evidence of aortic valve regurgitation. The peak instantaneous gradient of the aortic valve is 16.2 mmHg. The mean gradient of the aortic valve is 9.0 mmHg. Mitral Valve: The mitral valve is mildly thickened. There is mild mitral annular calcification. There is trace mitral valve " "regurgitation. Tricuspid Valve: The tricuspid valve is structurally normal. No evidence of tricuspid regurgitation. The right ventricular systolic pressure is unable to be estimated. Pulmonic Valve: The pulmonic valve is structurally normal. There is physiologic pulmonic valve regurgitation. Pericardium: There is a trivial pericardial effusion. Aorta: The aortic root is abnormal. S/p ascending aortic replacemtn with 32 mmGelweave graft. In comparison to the previous echocardiogram(s): Compared with study from 8/2/2023,. LVEF 45% on today's study; bioprosthetic AVR in place with normal function; no significant change in AV gradients.  CONCLUSIONS:  1. Left ventricular systolic function is mildly decreased with a 45% estimated ejection fraction.  2. There are multiple wall motion abnormalities.  3. The distal septal, inferior, and lateral segments are akinetic; LV apex is dyskinetic (similar to prior study). No LV thrombus on echo-contrast study; distal LV apical \"smoke\" consistent with flow stasis.  4. Abnormal septal motion consistent with post-operative status.  5. There is reduced right ventricular systolic function.  6. LVEF 45% on today's study; bioprosthetic AVR in place with normal function; no significant change in AV gradients. QUANTITATIVE DATA SUMMARY: 2D MEASUREMENTS:                           Normal Ranges: LAs:           4.30 cm    (2.7-4.0cm) IVSd:          1.60 cm    (0.6-1.1cm) LVPWd:         1.04 cm    (0.6-1.1cm) LVIDd:         4.60 cm    (3.9-5.9cm) LVIDs:         3.47 cm LV Mass Index: 123.9 g/m2 LV % FS        24.6 % LA VOLUME:                             Normal Ranges: LA Volume Index: 26.0 ml/m2 M-MODE MEASUREMENTS:                  Normal Ranges: Ao Root: 3.20 cm (2.0-3.7cm) LAs:     5.60 cm (2.7-4.0cm) LV SYSTOLIC FUNCTION BY 2D PLANIMETRY (MOD):                     Normal Ranges: EF-A4C View: 46.6 % (>=55%) LV DIASTOLIC FUNCTION:                               Normal Ranges: MV Peak E:       "  1.03 m/s    (0.7-1.2 m/s) MV Peak A:        1.07 m/s    (0.42-0.7 m/s) E/A Ratio:        0.96        (1.0-2.2) MV lateral e'     0.09 m/s MV medial e'      0.06 m/s MV A Dur:         130.00 msec PulmV Sys Seb:    43.80 cm/s PulmV Diallo Seb:   40.60 cm/s PulmV S/D Seb:    1.10 PulmV A Revs Seb: 27.80 cm/s PulmV A Revs Dur: 123.00 msec MITRAL VALVE:                 Normal Ranges: MV DT: 190 msec (150-240msec) AORTIC VALVE:                                    Normal Ranges: AoV Vmax:                2.01 m/s  (<=1.7m/s) AoV Peak P.2 mmHg (<20mmHg) AoV Mean P.0 mmHg  (1.7-11.5mmHg) LVOT Max Seb:            1.13 m/s  (<=1.1m/s) AoV VTI:                 44.50 cm  (18-25cm) LVOT VTI:                23.70 cm LVOT Diameter:           2.20 cm   (1.8-2.4cm) AoV Area, VTI:           2.02 cm2  (2.5-5.5cm2) AoV Area,Vmax:           2.14 cm2  (2.5-4.5cm2) AoV Dimensionless Index: 0.53  RIGHT VENTRICLE: RV s' 0.09 m/s PULMONIC VALVE:                      Normal Ranges: PV Max Seb: 1.0 m/s  (0.6-0.9m/s) PV Max PG:  3.6 mmHg Pulmonary Veins: PulmV A Revs Dur: 123.00 msec PulmV A Revs Seb: 27.80 cm/s PulmV Diallo Seb:   40.60 cm/s PulmV S/D Seb:    1.10 PulmV Sys Seb:    43.80 cm/s  [id number] Zeferino Munson MD Electronically signed on 2024 at 5:10:27 PM  ** Final **     ECG 12 lead    Result Date: 2024  Normal sinus rhythm Left anterior fascicular block Anterolateral infarct (cited on or before 08-SEP-2022) Abnormal ECG When compared with ECG of 08-SEP-2022 05:20, Left anterior fascicular block is now Present Criteria for Inferior infarct are no longer Present T wave inversion less evident in Anterolateral leads See ED provider note for full interpretation and clinical correlation Confirmed by Eula Aly ([id number]) on 2024 4:45:05 PM    CT brain attack head wo IV contrast    Result Date: 2024  Interpreted By:  Cecilia Meeks, STUDY: CT BRAIN ATTACK HEAD WO IV CONTRAST;  2024 9:08  am   INDICATION: Signs/Symptoms:Stroke Evaluation.   COMPARISON: 09/07/2022   ACCESSION NUMBER(S): [id number]   ORDERING CLINICIAN: LETICIA GARCÍA   TECHNIQUE: Examination was performed in the axial plane with sagittal and coronal reconstructions. Bone and soft tissue algorithms were performed.   FINDINGS: INTRACRANIAL: The ventricular system is symmetrical and nondilated. No mass or mass effect is identified. There is no hemorrhage or subdural fluid collection. There is no acute infarct. There is a remote infarct of the right parieto-occipital region in the remote infarct of the left parieto-occipital region.   EXTRACRANIAL: There is an unchanged 2.2 cm mucous retention cyst or polyp along the medial wall of the left maxillary sinus. There is mild mucosal thickening of the maxillary sinuses. There is mucosal thickening and opacification of multiple ethmoid air cells. There is mild mucosal thickening of the sphenoid sinuses. There is a 1.1 cm mucous retention cyst or polyp along the anterior wall of the left sphenoid sinus. There is opacification of a few left mastoid air cells consistent with mastoiditis. This is chronic and was seen on 09/07/2022.       1. No acute intracranial pathology. 2. Remote infarcts right and left parieto-occipital regions. 3. Chronic paranasal sinusitis and chronic mild left mastoiditis.   MACRO: Cecilia Meeks discussed the significance and urgency of this critical finding by secure chat with  LETICIA GARCÍA on 2/11/2024 at 9:14 am.  (**-RCF-**) Findings:  See findings.   Signed by: Cecilia Meeks 2/11/2024 9:14 AM Dictation workstation:   [id number]   Results for orders placed or performed during the hospital encounter of 02/11/24 (from the past 24 hour(s))   POCT GLUCOSE   Result Value Ref Range    POCT Glucose 157 (H) 74 - 99 mg/dL   Transthoracic Echo (TTE) Complete   Result Value Ref Range    AV pk alcides 2.01 m/s    AV mn grad 9.0 mmHg    LVOT diam 2.20 cm    MV E/A ratio 0.96      RV free wall pk S' 8.59 cm/s    LVIDd 4.60 cm    Aortic Valve Area by Continuity of VTI 2.02 cm2    Aortic Valve Area by Continuity of Peak Velocity 2.14 cm2    AV pk grad 16.2 mmHg    LV A4C EF 46.6    POCT GLUCOSE   Result Value Ref Range    POCT Glucose 145 (H) 74 - 99 mg/dL   POCT GLUCOSE   Result Value Ref Range    POCT Glucose 248 (H) 74 - 99 mg/dL   CBC   Result Value Ref Range    WBC 8.3 4.4 - 11.3 x10*3/uL    nRBC 0.0 0.0 - 0.0 /100 WBCs    RBC 4.28 (L) 4.50 - 5.90 x10*6/uL    Hemoglobin 13.0 (L) 13.5 - 17.5 g/dL    Hematocrit 38.7 (L) 41.0 - 52.0 %    MCV 90 80 - 100 fL    MCH 30.4 26.0 - 34.0 pg    MCHC 33.6 32.0 - 36.0 g/dL    RDW 12.4 11.5 - 14.5 %    Platelets 270 150 - 450 x10*3/uL   Renal Function Panel   Result Value Ref Range    Glucose 143 (H) 74 - 99 mg/dL    Sodium 139 136 - 145 mmol/L    Potassium 4.1 3.5 - 5.3 mmol/L    Chloride 103 98 - 107 mmol/L    Bicarbonate 27 21 - 32 mmol/L    Anion Gap 13 10 - 20 mmol/L    Urea Nitrogen 25 (H) 6 - 23 mg/dL    Creatinine 1.46 (H) 0.50 - 1.30 mg/dL    eGFR 52 (L) >60 mL/min/1.73m*2    Calcium 9.7 8.6 - 10.3 mg/dL    Phosphorus 4.0 2.5 - 4.9 mg/dL    Albumin 4.3 3.4 - 5.0 g/dL   Magnesium   Result Value Ref Range    Magnesium 2.36 1.60 - 2.40 mg/dL   POCT GLUCOSE   Result Value Ref Range    POCT Glucose 144 (H) 74 - 99 mg/dL   POCT GLUCOSE   Result Value Ref Range    POCT Glucose 230 (H) 74 - 99 mg/dL     Scheduled medications  aspirin, 81 mg, oral, Daily  atorvastatin, 80 mg, oral, Daily  clopidogrel, 75 mg, oral, Daily  fenofibrate, 160 mg, oral, Daily  furosemide, 40 mg, oral, Daily  insulin lispro, 0-10 Units, subcutaneous, TID with meals  losartan, 25 mg, oral, Daily  metoprolol succinate XL, 50 mg, oral, Daily  perflutren protein A microsphere, 0.5 mL, intravenous, Once in imaging  QUEtiapine, 12.5 mg, oral, Nightly  rivaroxaban, 20 mg, oral, q PM  sulfur hexafluoride microsphr, 2 mL, intravenous, Once in imaging  tamsulosin, 0.4 mg, oral,  Nightly      Continuous medications     PRN medications  PRN medications: dextrose 10 % in water (D10W), dextrose, glucagon                            Assessment/Plan   This patient currently has cardiac telemetry ordered; if you would like to modify or discontinue the telemetry order, click here to go to the orders activity to modify/discontinue the order.  Principal Problem:    LUE weakness  Acute CVA  -Patient to add ASA 81 mg p.o. daily to previously prescribed regimen of Xarelto 20 mg p.o. daily and Plavix 75 mg p.o. daily for CVA prophylaxis.  Patient to continue this addition x 21 days prior to returning back to Xarelto plus Plavix regimen.  It is also recommended for patient to continue previously prescribed rosuvastatin 40 mg p.o. daily for additional prophylaxis.  -Recommendations for needs during hospitalization and at discharge via PT and OT  -Continue promotion of lifestyle modifications, such as: Strict BP and glycemic control, dietary habit changes, incorporation of daily exercise regimen, adherence to all prescription/OTC medication schedules, attendance to all follow-up appointments, cessation from smoking if applicable, abstinence from alcohol and illicit drug use if applicable  -Patient to follow-up with PCP in 1 to 2 weeks postdischarge  -Patient to follow-up with previously established neurologist Dr. Juanjo Miller within 1 to 2 months postdischarge    Total face-to-face time spent with patient today was approximately 30 minutes; more than 50% of my time was spent counseling patient on: preparation to see patient via chart review of resulted laboratory values, radiographic imaging, prescribed medications, and impairment of involved organ systems. Time also spent on medical examination, placing appropriate orders for testing/medications, communicating with other health care providers, counseling/educating the patient/family, and care coordination.    Neurology to sign off at this time. Thank you  for the opportunity to be a part of this patient's multidisciplinary treatment team.  If any additional questions or concerns arise, please do not hesitate to contact me or the on-call neurologist via [employer].    *This note was created using voice recognition transcription software. Despite proofreading, unintentional typographical errors may be present. Please contact the department of neurology with any questions or concerns.         Elisabeth Lopez, MAXIMILIANO-CNP

## 2024-02-13 NOTE — DISCHARGE SUMMARY
"Discharge Diagnosis  Acute CVA (cerebrovascular accident) (CMS/[facility])    Issues Requiring Follow-Up      Discharge Meds     Your medication list        CONTINUE taking these medications        Instructions Last Dose Given Next Dose Due   BD Ultra-Fine Kerry Pen Needle 32 gauge x 5/32\" needle  Generic drug: pen needle, diabetic      AS DIRECTED              ASK your doctor about these medications        Instructions Last Dose Given Next Dose Due   atorvastatin 80 mg tablet  Commonly known as: Lipitor      TAKE 1 TABLET BY MOUTH EVERY DAY       clopidogrel 75 mg tablet  Commonly known as: Plavix      TAKE 1 TABLET BY MOUTH EVERY DAY       cyanocobalamin (vitamin B-12) 1,000 mcg tablet extended release  Commonly known as: Vitamin B-12  Ask about: Which instructions should I use?           fenofibrate 160 mg tablet  Commonly known as: Triglide      TAKE 1 TABLET BY MOUTH EVERY DAY       furosemide 40 mg tablet  Commonly known as: Lasix      TAKE 1 TABLET BY MOUTH EVERY DAY       insulin glargine-yfgn 100 unit/mL (3 mL) Pen  Commonly known as: Semglee(insulin glarg-yfgn)Pen      Inject 14 Units under the skin once every 24 hours. Take as directed per insulin instructions.       Lantus Solostar U-100 Insulin 100 unit/mL (3 mL) pen  Generic drug: insulin glargine           losartan 25 mg tablet  Commonly known as: Cozaar      TAKE 1 TABLET BY MOUTH EVERY DAY AS DIRECTED       metFORMIN  mg 24 hr tablet  Commonly known as: Glucophage-XR           metoprolol succinate XL 50 mg 24 hr tablet  Commonly known as: Toprol-XL      TAKE 1 TABLET BY MOUTH EVERY DAY       multivitamin with minerals tablet           QUEtiapine 25 mg tablet  Commonly known as: SEROquel      Take 0.5 tablets (12.5 mg) by mouth once daily at bedtime.       tamsulosin 0.4 mg 24 hr capsule  Commonly known as: Flomax      TAKE 1 CAPSULE BY MOUTH EVERYDAY AT BEDTIME       Xarelto 20 mg tablet  Generic drug: rivaroxaban                    Test Results " "Pending At Discharge  Pending Labs       No current pending labs.            Hospital Course   Duane Jin is a 68 y.o. male with past med history of T2DM, CAD, CABG 2020, AVR, CVA 2022, HLD, HTN presenting to [facility] ED with chief complaint of left upper extremity weakness. Patient does have a history of stroke in 2022 and does have some remaining deficits from that time including left upper extremity weakness, left facial droop, dysarthria, and left peripheral visual field defects. Initial CT head negative, MRI showed a hyperdensity in the left frontal lobe suggestive of an acute or subacute infarct, as well as numerous chronic lacunar infarcts in the cerebellum, new from 9/22. Echocardiogram showed LV dyskinesis with apical \"smoke\" consistent with flow stasis. Neurology was consulted who recommended starting a 21 day course of aspirin in addition to his home Xarelto and Plavix. Patient was evaluated by physical therapy who did not recommend further physical therapy. Patient will be discharged home with instructions to follow up with his PCP in two weeks and his neurologist Dr. Wall in 1-2 months.    Pertinent Physical Exam At Time of Discharge  Physical Exam  Constitutional:       Appearance: Normal appearance.   HENT:      Head: Normocephalic and atraumatic.      Mouth/Throat:      Mouth: Mucous membranes are moist.   Eyes:      Extraocular Movements: Extraocular movements intact.   Cardiovascular:      Rate and Rhythm: Normal rate and regular rhythm.   Pulmonary:      Effort: Pulmonary effort is normal.      Breath sounds: Normal breath sounds.   Abdominal:      General: Abdomen is flat. There is no distension.      Palpations: Abdomen is soft.   Musculoskeletal:      Right lower leg: No edema.      Left lower leg: No edema.   Skin:     General: Skin is warm and dry.   Neurological:      General: No focal deficit present.      Mental Status: He is alert and oriented to person, place, and time. "   Psychiatric:         Mood and Affect: Mood normal.         Outpatient Follow-Up  Future Appointments   Date Time Provider Department Center   5/7/2024  9:00 AM Kwabena ZIMMERMAN DO [id number] [place]   5/28/2024 10:15 AM Anuj Ham MD [id number] [place]   9/12/2024  2:00 PM Corazon Meyer MD [id number] [facility]         Braulio Blanco DO

## 2024-02-13 NOTE — NURSING NOTE
At approximately 915am, this RN was in the patient's room and was doing his med pass.  When I asked the patient for his name and his birthday, he told me his street address rather than his birthday and he told me the year was 2014.  His left facial droop seemed to be greater than it was in prior assessments, where I felt it was barely noticeable.  At this time I told the patient that I felt he was a little more confused that normal and his droop looked more significant and that I was going to have some people come up and check him out.  I went out side of the room and called the  and asked her to call a stroke team.  I ran in to the nursing supervisor Lavonne, and Dr. Vela at this time.  Dr. Vela told me to cancel the stroke team so it was cancelled.  I will continue to monitor.

## 2024-02-15 ENCOUNTER — PATIENT OUTREACH (OUTPATIENT)
Dept: CARE COORDINATION | Facility: CLINIC | Age: 69
End: 2024-02-15
Payer: MEDICARE

## 2024-02-15 NOTE — PROGRESS NOTES
Discharge Facility:[facility]  Discharge Diagnosis:Acute CVA   Admission Date:2/11/2024  Discharge Date: 2/13/2024    PCP Appointment Date:TBD  Specialist Appointment Date: Neurology/Mae TORRESD, Cardio/Fatoumata 5/28/2024, may see sooner   Hospital Encounter and Summary: Linked  See discharge assessment below for further details  Engagement  Call Start Time: 1239 (2/15/2024 12:46 PM)    Medications  Medications reviewed with patient/caregiver?: Yes (2/15/2024 12:46 PM)  Is the patient having any side effects they believe may be caused by any medication additions or changes?: No (2/15/2024 12:46 PM)  Does the patient have all medications ordered at discharge?: Yes (2/15/2024 12:46 PM)  Care Management Interventions: No intervention needed (2/15/2024 12:46 PM)  Prescription Comments: -- (ASA 81 mg one qd x 3 weeks) (2/15/2024 12:46 PM)  Is the patient taking all medications as directed (includes completed medication regime)?: Yes (2/15/2024 12:46 PM)    Appointments  Does the patient have a primary care provider?: Yes (2/15/2024 12:46 PM)  Care Management Interventions: -- (Roselyn states lots going on with their daughter and upcoming surgery. She will contact PCP if changes mind.) (2/15/2024 12:46 PM)  Has the patient kept scheduled appointments due by today?: Yes (2/15/2024 12:46 PM)    Self Management  What is the home health agency?: -- (N/A Told that if would like Therapy to come to home can get order for if feels would help. She will d/w Neurology) (2/15/2024 12:46 PM)  What Durable Medical Equipment (DME) was ordered?: -- (N/A) (2/15/2024 12:46 PM)    Patient Teaching  Does the patient have access to their discharge instructions?: Yes (2/15/2024 12:46 PM)  What is the patient's perception of their health status since discharge?: Improving (2/15/2024 12:46 PM)  Is the patient/caregiver able to teach back the hierarchy of who to call/visit for symptoms/problems? PCP, Specialist, Home Health nurse, Urgent Care, ED, 911: Yes  (2/15/2024 12:46 PM)    Wrap Up  Wrap Up Additional Comments: -- (Spoke with Roselyn. She states Duane seems to be doing pretty good, he helped her with some chores around the homeShe has contacted Neurology for fu. He has heart monitor on for few weeks and will fu with Cardio when complete.) (2/15/2024 12:46 PM)

## 2024-02-19 ENCOUNTER — TELEPHONE (OUTPATIENT)
Dept: NEUROLOGY | Facility: CLINIC | Age: 69
End: 2024-02-19
Payer: MEDICARE

## 2024-02-19 NOTE — TELEPHONE ENCOUNTER
I spoke to patient wife per our conversation ok to add Aspirin 81mg  x 21days per discharge summary. Watch for increased bleeding with addition of Aspirin. At hospital follow up may discuss changing Xarelto to something else. As far as dental work goes right now if they can postpone that would be best. Patients wife verbalized understanding and is agreeable with this plan.

## 2024-02-21 DIAGNOSIS — I50.9 HEART FAILURE, UNSPECIFIED HF CHRONICITY, UNSPECIFIED HEART FAILURE TYPE (MULTI): ICD-10-CM

## 2024-02-21 DIAGNOSIS — I63.9 CARDIOEMBOLIC STROKE (MULTI): Primary | ICD-10-CM

## 2024-02-21 DIAGNOSIS — Z79.4 TYPE 2 DIABETES MELLITUS WITHOUT COMPLICATION, WITH LONG-TERM CURRENT USE OF INSULIN (MULTI): Chronic | ICD-10-CM

## 2024-02-21 DIAGNOSIS — E11.9 TYPE 2 DIABETES MELLITUS WITHOUT COMPLICATION, WITH LONG-TERM CURRENT USE OF INSULIN (MULTI): Chronic | ICD-10-CM

## 2024-02-27 ENCOUNTER — TELEMEDICINE (OUTPATIENT)
Dept: PHARMACY | Facility: HOSPITAL | Age: 69
End: 2024-02-27
Payer: MEDICARE

## 2024-02-27 DIAGNOSIS — I50.9 HEART FAILURE, UNSPECIFIED HF CHRONICITY, UNSPECIFIED HEART FAILURE TYPE (MULTI): ICD-10-CM

## 2024-02-27 DIAGNOSIS — E11.9 TYPE 2 DIABETES MELLITUS WITHOUT COMPLICATION, WITH LONG-TERM CURRENT USE OF INSULIN (MULTI): Chronic | ICD-10-CM

## 2024-02-27 DIAGNOSIS — I63.9 CARDIOEMBOLIC STROKE (MULTI): ICD-10-CM

## 2024-02-27 DIAGNOSIS — Z79.4 TYPE 2 DIABETES MELLITUS WITHOUT COMPLICATION, WITH LONG-TERM CURRENT USE OF INSULIN (MULTI): Chronic | ICD-10-CM

## 2024-02-27 RX ORDER — DAPAGLIFLOZIN 5 MG/1
5 TABLET, FILM COATED ORAL DAILY
Qty: 30 TABLET | Refills: 3 | Status: SHIPPED | OUTPATIENT
Start: 2024-02-27 | End: 2025-02-26

## 2024-02-27 ASSESSMENT — ENCOUNTER SYMPTOMS
SHORTNESS OF BREATH: 0
POLYDIPSIA: 0
UNEXPECTED WEIGHT CHANGE: 0
EDEMA: 0
VISUAL CHANGE: 0

## 2024-02-27 NOTE — PROGRESS NOTES
"Pharmacy Post-Discharge Visit  Isaiah Jin \"Arun" is a 68 y.o. male was referred to Clinical Pharmacy Team to complete a post-discharge medication optimization and monitoring visit.  The patient was referred for their Congestive Heart Failure, Diabetes, and Cerebrovascular Accident. Spoke to caregiver, patient's wife.     Admission Date: 1/24  Discharge Date: 1/31    Referring Provider: Kwabena Gomez,     Subjective   No Known Allergies    [id number] - [place], [place] - [address] [place] [address] AT [address] [address]  Phone: [phone] Fax: [phone]      Social History     Social History Narrative    Not on file        Notable Medication changes following discharge:  Start: Aspirin 81 mg :1 tab daily x 21 days  Stop: N/A  Change: N/A    Congestive Heart Failure  Presents for initial visit. The disease course has been stable. Pertinent negatives include no edema, shortness of breath or unexpected weight change. The symptoms have been stable. Past treatments include beta blockers and angiotensin receptor blockers. Compliance with prior treatments has been good.   Diabetes  He presents for his initial diabetic visit. He has type 2 diabetes mellitus. His disease course has been improving. There are no hypoglycemic associated symptoms. Pertinent negatives for diabetes include no polydipsia and no visual change. Diabetic complications include a CVA. Current diabetic treatment includes insulin injections and oral agent (monotherapy). He is compliant with treatment all of the time. An ACE inhibitor/angiotensin II receptor blocker is being taken. He does not see a podiatrist.Eye exam is current.   Cerebrovascular Accident  This is a recurrent problem. Pertinent negatives include no visual change.     Review of Systems   Constitutional:  Negative for unexpected weight change.   Respiratory:  Negative for shortness of breath.    Endocrine: Negative for " polydipsia.     CHF medications  Losartan 25 mg once daily  Metoprolol XL 50 mg once daily    DM medications  Lantus 14 units once daily  Metformin 500 mg once daily    CVA Medications  Clopidogrel 75 mg once daily  Xarelto 20 mg once daily  Atorvastatin 80 mg once daily    DISCUSSION  CHF (estimated ejection fraction: 40% 23)  Patient denies SOB, swelling, weight fluctuations  Patient is limiting his salt intake  Patient follows with cardiology- next appointment   Patient does not check BP regularly at home but last in office reading was 130/75  Patient has never been on Entresto in the past   Counseled on MOA of Entresto and recommended to replace losartan for reduction in morbidity and mortality in patients with heart failure   Patient to discuss with cardiologist   DM (A1c 8.6% 24)  FB-130s  Denies signs of hypoglycemia and hyperglycemia   Patient drinks 1-2 glasses of low sugar juice daily   Patient is up to date with eye exam and foot exam  Patient has never been on SGLT2 inhibitor  Counseled on MOA, administration, side effects  Denies history of UTIs  Recommend starting Farxiga 5 mg for blood sugar lowering, renal protection and heart protection   Patient is agreeable to start  CVA  Patient has been taking aspirin + clopidogrel + Xarelto daily as prescribed and denies signs of increased bleeding   States Xarelto is very costly ($700/ 3 month supply- likely due to deductible  Patient was previously on  patient assistance program that brought price down  Patient may qualify for  Patient Assistance Program based on household income   Caregiver to submit proof of income for application   Objective     There were no vitals taken for this visit.     LAB  Lab Results   Component Value Date    BILITOT 0.5 2024    CALCIUM 9.7 2024    CO2 27 2024     2024    CREATININE 1.46 (H) 2024    GLUCOSE 143 (H) 2024    ALKPHOS 40 2024    K 4.1  02/13/2024    PROT 6.3 (L) 02/12/2024     02/13/2024    AST 13 02/12/2024    ALT 16 02/12/2024    BUN 25 (H) 02/13/2024    ANIONGAP 13 02/13/2024    MG 2.36 02/13/2024    PHOS 4.0 02/13/2024    ALBUMIN 4.3 02/13/2024    GFRMALE 63 05/26/2023     Lab Results   Component Value Date    TRIG 147 02/11/2024    CHOL 124 02/11/2024    LDLCALC 62 02/11/2024    HDL 32.7 02/11/2024     Lab Results   Component Value Date    HGBA1C 8.6 (H) 02/11/2024         Current Outpatient Medications on File Prior to Visit   Medication Sig Dispense Refill    aspirin 81 mg EC tablet Take 1 tablet (81 mg) by mouth once daily. Do not start before February 14, 2024. 63 tablet 0    atorvastatin (Lipitor) 80 mg tablet TAKE 1 TABLET BY MOUTH EVERY DAY (Patient taking differently: Take 1 tablet (80 mg) by mouth once daily in the morning.) 90 tablet 3    clopidogrel (Plavix) 75 mg tablet TAKE 1 TABLET BY MOUTH EVERY DAY (Patient taking differently: Take 1 tablet (75 mg) by mouth once daily in the morning.) 90 tablet 1    cyanocobalamin, vitamin B-12, (Vitamin B-12) 1,000 mcg tablet extended release Take 1 tablet (1,000 mcg) by mouth once daily in the morning.      fenofibrate (Triglide) 160 mg tablet TAKE 1 TABLET BY MOUTH EVERY DAY (Patient taking differently: Take 1 tablet (160 mg) by mouth once daily in the morning.) 90 tablet 3    furosemide (Lasix) 40 mg tablet TAKE 1 TABLET BY MOUTH EVERY DAY (Patient taking differently: Take 1 tablet (40 mg) by mouth once daily in the morning.) 90 tablet 2    Lantus Solostar U-100 Insulin 100 unit/mL (3 mL) pen Inject 14 Units under the skin once daily at bedtime.      losartan (Cozaar) 25 mg tablet TAKE 1 TABLET BY MOUTH EVERY DAY AS DIRECTED 90 tablet 3    metFORMIN  mg 24 hr tablet TAKE 1 TABLET BY MOUTH DAILY WITH EVENING MEAL 90 tablet 3    metoprolol succinate XL (Toprol-XL) 50 mg 24 hr tablet TAKE 1 TABLET BY MOUTH EVERY DAY (Patient taking differently: Take 1 tablet (50 mg) by mouth once  "daily in the morning.) 90 tablet 3    multivitamin with minerals (multivit-min-iron fum-folic ac) tablet Take 1 tablet by mouth once daily in the morning.      pen needle, diabetic (BD Ultra-Fine Kerry Pen Needle) 32 gauge x 5/32\" needle AS DIRECTED 100 each 3    QUEtiapine (SEROquel) 25 mg tablet Take 0.5 tablets (12.5 mg) by mouth once daily at bedtime. 45 tablet 3    tamsulosin (Flomax) 0.4 mg 24 hr capsule TAKE 1 CAPSULE BY MOUTH EVERYDAY AT BEDTIME 90 capsule 1    Xarelto 20 mg tablet Take 1 tablet (20 mg) by mouth once daily in the evening.      [DISCONTINUED] insulin glargine-yfgn (Semglee,insulin glarg-yfgn,Pen) 100 unit/mL (3 mL) Pen Inject 14 Units under the skin once every 24 hours. Take as directed per insulin instructions. 3 mL 12     No current facility-administered medications on file prior to visit.      Assessment/Plan   Problem List Items Addressed This Visit       Cardioembolic stroke (CMS/[facility])     CONTINUE Xarelto 20 mg daily, clopidogrel 75 mg daily and aspirin 81 mg daily and monitor for signs of bleeding. Take aspirin for 21 days as instructed at hospital discharge  Please submit proof of income for  Patient Assistance Application          Type 2 diabetes mellitus without complications (CMS/[facility]) (Chronic)     Diabetes is uncontrolled with A1c of 8.6% ( goal <7%) however recent fasting blood glucose have improved and are within target goal of   START Farxiga 5 mg once daily due to cardiovascular, renal, and glycemic benefits   Prescription sent to  [facility] to be delivered and used with free voucher  CONTINUE lantus 14 mg daily and metformin 500 mg daily         Heart failure (CMS/HCC)     Heart failure is controlled as this time as patient is asymptomatic although patient can benefit from adding more guideline directed therapy to his medication regimen as blood pressures are adequately controlled   Recommend switching from losartan to ENTRESTO 24 mg-26 mg twice daily  Patient and " caregiver to discuss with cardiologist   In the meantime, CONTINUE metoprolol XL 50 mg daily and losartan 25 mg daily          Follow up in 2 weeks to check on  PAP application status  Continue all meds under the continuation of care with the referring provider and clinical pharmacy team.  -----------------  ADDENDUM 2/29    Proof of income has been received   Patient Assistance Application submitted for both XARELTO 20 mg and Farxiga 5 mg   Rufina Mercado PharmD     Verbal consent to manage patient's drug therapy was obtained from the patient and an individual authorized to act on behalf of a patient. They were informed they may decline to participate or withdraw from participation in pharmacy services at any time.

## 2024-02-27 NOTE — ASSESSMENT & PLAN NOTE
CONTINUE Xarelto 20 mg daily, clopidogrel 75 mg daily and aspirin 81 mg daily and monitor for signs of bleeding. Take aspirin for 21 days as instructed at hospital discharge  Please submit proof of income for  Patient Assistance Application

## 2024-02-27 NOTE — ASSESSMENT & PLAN NOTE
Diabetes is uncontrolled with A1c of 8.6% ( goal <7%) however recent fasting blood glucose have improved and are within target goal of   START Farxiga 5 mg once daily due to cardiovascular, renal, and glycemic benefits   Prescription sent to [facility] to be delivered and used with free voucher  CONTINUE lantus 14 mg daily and metformin 500 mg daily

## 2024-02-27 NOTE — ASSESSMENT & PLAN NOTE
Heart failure is controlled as this time as patient is asymptomatic although patient can benefit from adding more guideline directed therapy to his medication regimen as blood pressures are adequately controlled   Recommend switching from losartan to ENTRESTO 24 mg-26 mg twice daily  Patient and caregiver to discuss with cardiologist   In the meantime, CONTINUE metoprolol XL 50 mg daily and losartan 25 mg daily

## 2024-02-29 ENCOUNTER — PATIENT OUTREACH (OUTPATIENT)
Dept: PRIMARY CARE | Facility: CLINIC | Age: 69
End: 2024-02-29
Payer: MEDICARE

## 2024-02-29 PROCEDURE — RXMED WILLOW AMBULATORY MEDICATION CHARGE

## 2024-02-29 RX ORDER — RIVAROXABAN 20 MG/1
20 TABLET, FILM COATED ORAL EVERY EVENING
Qty: 90 TABLET | Refills: 3 | Status: SHIPPED | OUTPATIENT
Start: 2024-02-29

## 2024-02-29 NOTE — PROGRESS NOTES
Unable to reach patient for call back after patient's follow up appointment with PCP.   KALIAM with call back number for patient to call if needed   If no voicemail available call attempts x 2 were made to contact the patient to assist with any questions or concerns patient may have.

## 2024-03-01 ENCOUNTER — PHARMACY VISIT (OUTPATIENT)
Dept: PHARMACY | Facility: CLINIC | Age: 69
End: 2024-03-01
Payer: COMMERCIAL

## 2024-03-06 LAB — BODY SURFACE AREA: 1.93 M2

## 2024-03-06 PROCEDURE — RXMED WILLOW AMBULATORY MEDICATION CHARGE

## 2024-03-07 ENCOUNTER — PHARMACY VISIT (OUTPATIENT)
Dept: PHARMACY | Facility: CLINIC | Age: 69
End: 2024-03-07
Payer: COMMERCIAL

## 2024-03-12 ENCOUNTER — TELEMEDICINE (OUTPATIENT)
Dept: PHARMACY | Facility: HOSPITAL | Age: 69
End: 2024-03-12
Payer: MEDICARE

## 2024-03-12 DIAGNOSIS — Z79.4 TYPE 2 DIABETES MELLITUS WITHOUT COMPLICATION, WITH LONG-TERM CURRENT USE OF INSULIN (MULTI): Primary | ICD-10-CM

## 2024-03-12 DIAGNOSIS — E11.9 TYPE 2 DIABETES MELLITUS WITHOUT COMPLICATION, WITH LONG-TERM CURRENT USE OF INSULIN (MULTI): Primary | ICD-10-CM

## 2024-03-12 RX ORDER — INSULIN GLARGINE 100 [IU]/ML
INJECTION, SOLUTION SUBCUTANEOUS
Qty: 15 ML | Refills: 3 | Status: SHIPPED | OUTPATIENT
Start: 2024-03-12 | End: 2024-04-15 | Stop reason: ALTCHOICE

## 2024-03-12 NOTE — PROGRESS NOTES
"Pharmacy Post-Discharge Visit  Isaiah Jin \"Duane\" is a 68 y.o. male was referred to Clinical Pharmacy Team to complete a post-discharge medication optimization and monitoring visit.  The patient was referred for their Congestive Heart Failure, Diabetes, and Cerebrovascular Accident.     Caregiver presents for a follow up to discuss  Patient Assistance Program status.     Admission Date:   Discharge Date:     Referring Provider: Kwabena Gomez DO    Subjective     DISCUSSION  Patient was approved for  Patient Assistance Program for Farxiga  Active benefit period is through 3/6/25  Xarelto is refill too soon until 3/30/24 but will be approved as well   Caregiver states that patient's Lantus insulin is expensive as well  Will have this medication added to patient's application   Patient has taken 2 doses of Farxiga so far and has been tolerating it well  Advised to take in the mornings   FB-130s  BP: 130s/80s  Patient has been going on walks lately   Denies signs of hypoglycemia   Denies signs of bleeding  Denies missed doses of any medications  Patient has finished his 3 week course of aspirin     Objective     There were no vitals taken for this visit.     LAB  Lab Results   Component Value Date    BILITOT 0.5 2024    CALCIUM 9.7 2024    CO2 27 2024     2024    CREATININE 1.46 (H) 2024    GLUCOSE 143 (H) 2024    ALKPHOS 40 2024    K 4.1 2024    PROT 6.3 (L) 2024     2024    AST 13 2024    ALT 16 2024    BUN 25 (H) 2024    ANIONGAP 13 2024    MG 2.36 2024    PHOS 4.0 2024    ALBUMIN 4.3 2024    GFRMALE 63 2023     Lab Results   Component Value Date    TRIG 147 2024    CHOL 124 2024    LDLCALC 62 2024    HDL 32.7 2024     Lab Results   Component Value Date    HGBA1C 8.6 (H) 2024         Current Outpatient Medications on File Prior to Visit " "  Medication Sig Dispense Refill    aspirin 81 mg EC tablet Take 1 tablet (81 mg) by mouth once daily. Do not start before February 14, 2024. 63 tablet 0    atorvastatin (Lipitor) 80 mg tablet TAKE 1 TABLET BY MOUTH EVERY DAY (Patient taking differently: Take 1 tablet (80 mg) by mouth once daily in the morning.) 90 tablet 3    clopidogrel (Plavix) 75 mg tablet TAKE 1 TABLET BY MOUTH EVERY DAY (Patient taking differently: Take 1 tablet (75 mg) by mouth once daily in the morning.) 90 tablet 1    cyanocobalamin, vitamin B-12, (Vitamin B-12) 1,000 mcg tablet extended release Take 1 tablet (1,000 mcg) by mouth once daily in the morning.      dapagliflozin propanediol (Farxiga) 5 mg Take 1 tablet (5 mg) by mouth once daily. 30 tablet 3    fenofibrate (Triglide) 160 mg tablet TAKE 1 TABLET BY MOUTH EVERY DAY (Patient taking differently: Take 1 tablet (160 mg) by mouth once daily in the morning.) 90 tablet 3    furosemide (Lasix) 40 mg tablet TAKE 1 TABLET BY MOUTH EVERY DAY (Patient taking differently: Take 1 tablet (40 mg) by mouth once daily in the morning.) 90 tablet 2    Lantus Solostar U-100 Insulin 100 unit/mL (3 mL) pen Inject 14 Units under the skin once daily at bedtime.      losartan (Cozaar) 25 mg tablet TAKE 1 TABLET BY MOUTH EVERY DAY AS DIRECTED 90 tablet 3    metFORMIN  mg 24 hr tablet TAKE 1 TABLET BY MOUTH DAILY WITH EVENING MEAL 90 tablet 3    metoprolol succinate XL (Toprol-XL) 50 mg 24 hr tablet TAKE 1 TABLET BY MOUTH EVERY DAY (Patient taking differently: Take 1 tablet (50 mg) by mouth once daily in the morning.) 90 tablet 3    multivitamin with minerals (multivit-min-iron fum-folic ac) tablet Take 1 tablet by mouth once daily in the morning.      pen needle, diabetic (BD Ultra-Fine Kerry Pen Needle) 32 gauge x 5/32\" needle AS DIRECTED 100 each 3    QUEtiapine (SEROquel) 25 mg tablet Take 0.5 tablets (12.5 mg) by mouth once daily at bedtime. 45 tablet 3    tamsulosin (Flomax) 0.4 mg 24 hr capsule " TAKE 1 CAPSULE BY MOUTH EVERYDAY AT BEDTIME 90 capsule 1    Xarelto 20 mg tablet Take 1 tablet (20 mg) by mouth once daily in the evening. 90 tablet 3     No current facility-administered medications on file prior to visit.        Assessment/Plan   Cardioembolic stroke (CMS/[facility])        CONTINUE Xarelto 20 mg daily, clopidogrel 75 mg daily and aspirin 81 mg daily and monitor for signs of bleeding.            Type 2 diabetes mellitus without complications (CMS/[facility]) (Chronic)       Diabetes is uncontrolled with A1c of 8.6% ( goal <7%) however recent fasting blood glucose have improved and are within target goal of   CONTINUE lantus 14 mg daily, Farxiga 5 mg daily, and metformin 500 mg daily  Will have Lantus added to patient's PAP profile           Heart failure (CMS/[facility])       Heart failure is controlled as this time as patient is asymptomatic although patient can benefit from adding more guideline directed therapy to his medication regimen as blood pressures are adequately controlled   Recommend switching from losartan to ENTRESTO 24 mg-26 mg twice daily  Patient and caregiver to discuss with cardiologist at next visit in May  In the meantime, CONTINUE metoprolol XL 50 mg daily and losartan 25 mg daily           Follow up 4/16 to assess blood glucose control and tolerability to Farxiga     Continue all meds under the continuation of care with the referring provider and clinical pharmacy team.  -------  ADDENDUM 4/15  Insurance no longer covers Lantus but will cover Semglee (same active ingredient  Will update  Patient Assistance Program to add Semglee to patient's profile instead  Rufina Mercado PharmD     Verbal consent to manage patient's drug therapy was obtained from the patient. They were informed they may decline to participate or withdraw from participation in pharmacy services at any time.

## 2024-03-28 ENCOUNTER — TELEMEDICINE (OUTPATIENT)
Dept: NEUROLOGY | Facility: CLINIC | Age: 69
End: 2024-03-28
Payer: MEDICARE

## 2024-03-28 DIAGNOSIS — I10 ESSENTIAL (PRIMARY) HYPERTENSION: Chronic | ICD-10-CM

## 2024-03-28 DIAGNOSIS — E11.9 TYPE 2 DIABETES MELLITUS WITHOUT COMPLICATION, UNSPECIFIED WHETHER LONG TERM INSULIN USE (MULTI): Chronic | ICD-10-CM

## 2024-03-28 DIAGNOSIS — I69.354 HEMIPLEGIA AND HEMIPARESIS FOLLOWING CEREBRAL INFARCTION AFFECTING LEFT NON-DOMINANT SIDE (MULTI): ICD-10-CM

## 2024-03-28 DIAGNOSIS — I63.9 CARDIOEMBOLIC STROKE (MULTI): ICD-10-CM

## 2024-03-28 DIAGNOSIS — F91.9 BEHAVIORAL DISORDER AS SEQUELA OF CEREBRAL INFARCTION: Primary | ICD-10-CM

## 2024-03-28 DIAGNOSIS — E78.2 MIXED HYPERLIPIDEMIA: ICD-10-CM

## 2024-03-28 DIAGNOSIS — I69.398 BEHAVIORAL DISORDER AS SEQUELA OF CEREBRAL INFARCTION: Primary | ICD-10-CM

## 2024-03-28 PROCEDURE — 99215 OFFICE O/P EST HI 40 MIN: CPT | Performed by: PSYCHIATRY & NEUROLOGY

## 2024-03-28 PROCEDURE — 4010F ACE/ARB THERAPY RXD/TAKEN: CPT | Performed by: PSYCHIATRY & NEUROLOGY

## 2024-03-28 PROCEDURE — 3048F LDL-C <100 MG/DL: CPT | Performed by: PSYCHIATRY & NEUROLOGY

## 2024-03-28 PROCEDURE — 3052F HG A1C>EQUAL 8.0%<EQUAL 9.0%: CPT | Performed by: PSYCHIATRY & NEUROLOGY

## 2024-03-28 PROCEDURE — 99215 OFFICE O/P EST HI 40 MIN: CPT | Mod: GT,95 | Performed by: PSYCHIATRY & NEUROLOGY

## 2024-03-28 PROCEDURE — 1036F TOBACCO NON-USER: CPT | Performed by: PSYCHIATRY & NEUROLOGY

## 2024-03-28 PROCEDURE — 1159F MED LIST DOCD IN RCRD: CPT | Performed by: PSYCHIATRY & NEUROLOGY

## 2024-03-28 RX ORDER — QUETIAPINE FUMARATE 25 MG/1
25 TABLET, FILM COATED ORAL NIGHTLY
Qty: 90 TABLET | Refills: 3 | Status: SHIPPED | OUTPATIENT
Start: 2024-03-28 | End: 2025-03-28

## 2024-03-28 ASSESSMENT — ACTIVITIES OF DAILY LIVING (ADL)
TOTAL_SCORE: 100
BED_TO_CHAIR_AND_BACK: INDEPENDENT
MOBILITY_LEVEL_SURFACES: INDEPENDENT (BUT MAY USE ANY AID FOR EXAMPLE, STICK) > 50 YARDS
FEEDING: INDEPENDENT
STAIRS: INDEPENDENT
BLADDER: CONTINENT
BATHING: INDEPENDENT (OR IN SHOWER)
DRESSING: INDEPENDENT (INCLUDING BUTTONS, ZIPS, LACES,ETC.)
BOWELS: INDEPENDENT (INCLUDING BUTTONS, ZIPS, LACES, ETC.)
TOILET_USE: INDEPENDENT (ON AND OFF, DRESSING, WIPING)
GROOMING: INDEPENDENT FACE/HAIR/TEETH.SHAVING (IMPLEMENTS PROVIDED)
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
## 2024-03-29 PROCEDURE — RXMED WILLOW AMBULATORY MEDICATION CHARGE

## 2024-04-01 ENCOUNTER — PHARMACY VISIT (OUTPATIENT)
Dept: PHARMACY | Facility: CLINIC | Age: 69
End: 2024-04-01
Payer: COMMERCIAL

## 2024-04-01 PROCEDURE — RXMED WILLOW AMBULATORY MEDICATION CHARGE

## 2024-04-02 ENCOUNTER — PHARMACY VISIT (OUTPATIENT)
Dept: PHARMACY | Facility: CLINIC | Age: 69
End: 2024-04-02
Payer: COMMERCIAL

## 2024-04-08 ENCOUNTER — SPECIALTY PHARMACY (OUTPATIENT)
Dept: PHARMACY | Facility: CLINIC | Age: 69
End: 2024-04-08

## 2024-04-15 PROCEDURE — RXMED WILLOW AMBULATORY MEDICATION CHARGE

## 2024-04-15 RX ORDER — INSULIN GLARGINE-YFGN 100 [IU]/ML
14 INJECTION, SOLUTION SUBCUTANEOUS EVERY 24 HOURS
Qty: 15 ML | Refills: 2 | Status: SHIPPED | OUTPATIENT
Start: 2024-04-15 | End: 2025-04-15

## 2024-04-16 ENCOUNTER — TELEMEDICINE (OUTPATIENT)
Dept: PHARMACY | Facility: HOSPITAL | Age: 69
End: 2024-04-16
Payer: MEDICARE

## 2024-04-16 DIAGNOSIS — I50.9 HEART FAILURE, UNSPECIFIED HF CHRONICITY, UNSPECIFIED HEART FAILURE TYPE (MULTI): Primary | ICD-10-CM

## 2024-04-16 DIAGNOSIS — Z79.4 TYPE 2 DIABETES MELLITUS WITHOUT COMPLICATION, WITH LONG-TERM CURRENT USE OF INSULIN (MULTI): ICD-10-CM

## 2024-04-16 DIAGNOSIS — I63.9 CARDIOEMBOLIC STROKE (MULTI): ICD-10-CM

## 2024-04-16 DIAGNOSIS — E11.9 TYPE 2 DIABETES MELLITUS WITHOUT COMPLICATION, WITH LONG-TERM CURRENT USE OF INSULIN (MULTI): ICD-10-CM

## 2024-04-16 NOTE — PROGRESS NOTES
"      Patient ID: Isaiah Jin \"Duane\" is a 68 y.o. male who presents for Diabetes and Congestive Heart Failure. At last visit, patient was approved for  PAP for his Xarelto, Lantus, and Farxiga.     Spoke with wife, Karol.    Referring Provider: Kwabena Gomez DO  PCP: Kwabena Gomez DO Last visit with PCP: 23 Next visit with PCP: 24      Subjective   Treatment Adherence:   Patient did take medications today.   Number of missed doses in last 7 days: 0.      Preferred pharmacy:  [facility] for Lantus, Xarelto, and Farxiga  Can patient afford prescribed medications: Yes, Patient is enrolled in  PAP    DISCUSSION  Patient has started taking Farxiga daily and denies any side effects  Counseled patient to hold Farxiga when he is sick ( severe diarrhea/vomiting) to decrease risk of dehydration leading to an MIMI   FB   /76  Pulse: 65   Denies any shortness of breath, swelling, weight fluctuations  Denies any signs of bleeding   Denies any signs of hypoglycemia   Patient has been more active lately as the weather is warming up  Patient is helping with yard work and mowing the lawn  Patient's insurance on longer covers Lantus  Switched to Semglee as active ingredient is the same   PAP profile is updated  Overall patient feels like he is doing well and has no further questions or concerns    Objective     There were no vitals taken for this visit.   BP Readings from Last 4 Encounters:   24 130/75   23 138/72   23 128/72   23 110/64      There were no vitals filed for this visit.     Labs  Lab Results   Component Value Date    BILITOT 0.5 2024    CALCIUM 9.7 2024    CO2 27 2024     2024    CREATININE 1.46 (H) 2024    GLUCOSE 143 (H) 2024    ALKPHOS 40 2024    K 4.1 2024    PROT 6.3 (L) 2024     2024    AST 13 2024    ALT 16 2024    BUN 25 (H) 2024    ANIONGAP 13 " "02/13/2024    MG 2.36 02/13/2024    PHOS 4.0 02/13/2024    ALBUMIN 4.3 02/13/2024    GFRMALE 63 05/26/2023     Lab Results   Component Value Date    TRIG 147 02/11/2024    CHOL 124 02/11/2024    LDLCALC 62 02/11/2024    HDL 32.7 02/11/2024     Lab Results   Component Value Date    HGBA1C 8.6 (H) 02/11/2024       Current Outpatient Medications   Medication Instructions    atorvastatin (LIPITOR) 80 mg, oral, Daily    clopidogrel (Plavix) 75 mg tablet TAKE 1 TABLET BY MOUTH EVERY DAY    cyanocobalamin, vitamin B-12, (Vitamin B-12) 1,000 mcg tablet extended release 1 tablet, oral, Every morning    Farxiga 5 mg, oral, Daily    fenofibrate (Triglide) 160 mg tablet TAKE 1 TABLET BY MOUTH EVERY DAY    furosemide (LASIX) 40 mg, oral, Daily    losartan (COZAAR) 25 mg, oral, Daily, as directed    metFORMIN XR (GLUCOPHAGE-XR) 500 mg, oral, Daily, take with evening meal    metoprolol succinate XL (TOPROL-XL) 50 mg, oral, Daily    multivitamin with minerals (multivit-min-iron fum-folic ac) tablet 1 tablet, oral, Every morning    pen needle, diabetic (BD Ultra-Fine Kerry Pen Needle) 32 gauge x 5/32\" needle AS DIRECTED    QUEtiapine (SEROQUEL) 25 mg, oral, Nightly    Semglee(insulin glarg-yfgn)Pen 14 Units, subcutaneous, Every 24 hours    tamsulosin (Flomax) 0.4 mg 24 hr capsule TAKE 1 CAPSULE BY MOUTH EVERYDAY AT BEDTIME    Xarelto 20 mg, oral, Every evening       Assessment/Plan   CONTINUE all medications as prescribed; no medication changes made at this time  [facility] for questions on delivery       Follow-up: 10 months to renew  PAP     Time spent with pt: Total length of time 20 (minutes) of the encounter and more than 50% was spent counseling the patient.    Rufina Mercado, PharmD    Continue all meds under the continuation of care with the referring provider and clinical pharmacy team.    "

## 2024-04-17 ENCOUNTER — PHARMACY VISIT (OUTPATIENT)
Dept: PHARMACY | Facility: CLINIC | Age: 69
End: 2024-04-17
Payer: COMMERCIAL

## 2024-04-25 PROCEDURE — RXMED WILLOW AMBULATORY MEDICATION CHARGE

## 2024-04-26 ENCOUNTER — PATIENT OUTREACH (OUTPATIENT)
Dept: CARE COORDINATION | Facility: CLINIC | Age: 69
End: 2024-04-26
Payer: MEDICARE

## 2024-04-26 NOTE — PROGRESS NOTES
Call after hospitalization.  At time of outreach call the patient feels as if their condition has improved since last visit.  Reviewed the PCP appointment with the pt and addressed any questions or concerns.  Spoke to Roselyn, she states Duane is doing well, no concerns.

## 2024-04-29 PROCEDURE — RXMED WILLOW AMBULATORY MEDICATION CHARGE

## 2024-04-30 ENCOUNTER — PHARMACY VISIT (OUTPATIENT)
Dept: PHARMACY | Facility: CLINIC | Age: 69
End: 2024-04-30
Payer: COMMERCIAL

## 2024-05-02 ENCOUNTER — PHARMACY VISIT (OUTPATIENT)
Dept: PHARMACY | Facility: CLINIC | Age: 69
End: 2024-05-02
Payer: COMMERCIAL

## 2024-05-07 ENCOUNTER — OFFICE VISIT (OUTPATIENT)
Dept: PRIMARY CARE | Facility: CLINIC | Age: 69
End: 2024-05-07
Payer: MEDICARE

## 2024-05-07 VITALS
WEIGHT: 182.4 LBS | DIASTOLIC BLOOD PRESSURE: 74 MMHG | BODY MASS INDEX: 27.65 KG/M2 | OXYGEN SATURATION: 94 % | TEMPERATURE: 95.7 F | HEART RATE: 74 BPM | HEIGHT: 68 IN | SYSTOLIC BLOOD PRESSURE: 130 MMHG

## 2024-05-07 DIAGNOSIS — I63.119 CEREBROVASCULAR ACCIDENT (CVA) DUE TO EMBOLISM OF VERTEBRAL ARTERY, UNSPECIFIED BLOOD VESSEL LATERALITY (MULTI): Chronic | ICD-10-CM

## 2024-05-07 DIAGNOSIS — E11.9 TYPE 2 DIABETES MELLITUS WITHOUT COMPLICATION, UNSPECIFIED WHETHER LONG TERM INSULIN USE (MULTI): Chronic | ICD-10-CM

## 2024-05-07 DIAGNOSIS — I71.21 ANEURYSM OF ASCENDING AORTA WITHOUT RUPTURE (CMS-HCC): Chronic | ICD-10-CM

## 2024-05-07 DIAGNOSIS — I63.9 RIGHT HEMISPHERE, CEREBRAL INFARCTION (MULTI): Chronic | ICD-10-CM

## 2024-05-07 DIAGNOSIS — I10 ESSENTIAL (PRIMARY) HYPERTENSION: Chronic | ICD-10-CM

## 2024-05-07 DIAGNOSIS — I63.9 CARDIOEMBOLIC STROKE (MULTI): ICD-10-CM

## 2024-05-07 DIAGNOSIS — Z12.5 SCREENING FOR PROSTATE CANCER: ICD-10-CM

## 2024-05-07 DIAGNOSIS — I50.9 HEART FAILURE, UNSPECIFIED HF CHRONICITY, UNSPECIFIED HEART FAILURE TYPE (MULTI): ICD-10-CM

## 2024-05-07 DIAGNOSIS — R56.9 SEIZURE (MULTI): ICD-10-CM

## 2024-05-07 DIAGNOSIS — R56.9 CONVULSIONS, UNSPECIFIED CONVULSION TYPE (MULTI): ICD-10-CM

## 2024-05-07 DIAGNOSIS — Z00.00 ROUTINE GENERAL MEDICAL EXAMINATION AT HEALTH CARE FACILITY: Primary | ICD-10-CM

## 2024-05-07 DIAGNOSIS — N18.6 END STAGE RENAL DISEASE (MULTI): ICD-10-CM

## 2024-05-07 DIAGNOSIS — I63.9 ACUTE CVA (CEREBROVASCULAR ACCIDENT) (MULTI): ICD-10-CM

## 2024-05-07 DIAGNOSIS — J81.0 ACUTE PULMONARY EDEMA (MULTI): ICD-10-CM

## 2024-05-07 DIAGNOSIS — I25.5 ISCHEMIC CARDIOMYOPATHY: Chronic | ICD-10-CM

## 2024-05-07 DIAGNOSIS — I71.20 THORACIC AORTIC ANEURYSM WITHOUT RUPTURE, UNSPECIFIED PART (CMS-HCC): ICD-10-CM

## 2024-05-07 DIAGNOSIS — Z99.2 DEPENDENCE ON RENAL DIALYSIS (CMS-HCC): ICD-10-CM

## 2024-05-07 DIAGNOSIS — I69.354 HEMIPLEGIA AND HEMIPARESIS FOLLOWING CEREBRAL INFARCTION AFFECTING LEFT NON-DOMINANT SIDE (MULTI): ICD-10-CM

## 2024-05-07 LAB
ALBUMIN SERPL BCP-MCNC: 4.6 G/DL (ref 3.4–5)
ALP SERPL-CCNC: 41 U/L (ref 33–136)
ALT SERPL W P-5'-P-CCNC: 15 U/L (ref 10–52)
ANION GAP SERPL CALC-SCNC: 15 MMOL/L (ref 10–20)
APPEARANCE UR: CLEAR
AST SERPL W P-5'-P-CCNC: 16 U/L (ref 9–39)
BILIRUB SERPL-MCNC: 0.5 MG/DL (ref 0–1.2)
BILIRUB UR STRIP.AUTO-MCNC: NEGATIVE MG/DL
BUN SERPL-MCNC: 26 MG/DL (ref 6–23)
CALCIUM SERPL-MCNC: 9.9 MG/DL (ref 8.6–10.6)
CHLORIDE SERPL-SCNC: 106 MMOL/L (ref 98–107)
CHOLEST SERPL-MCNC: 137 MG/DL (ref 0–199)
CHOLESTEROL/HDL RATIO: 4
CO2 SERPL-SCNC: 25 MMOL/L (ref 21–32)
COLOR UR: ABNORMAL
CREAT SERPL-MCNC: 1.35 MG/DL (ref 0.5–1.3)
CREAT UR-MCNC: 27.5 MG/DL (ref 20–370)
EGFRCR SERPLBLD CKD-EPI 2021: 57 ML/MIN/1.73M*2
GLUCOSE SERPL-MCNC: 110 MG/DL (ref 74–99)
GLUCOSE UR STRIP.AUTO-MCNC: ABNORMAL MG/DL
HDLC SERPL-MCNC: 34 MG/DL
KETONES UR STRIP.AUTO-MCNC: NEGATIVE MG/DL
LDLC SERPL CALC-MCNC: 60 MG/DL
LEUKOCYTE ESTERASE UR QL STRIP.AUTO: NEGATIVE
MICROALBUMIN UR-MCNC: <7 MG/L
MICROALBUMIN/CREAT UR: NORMAL MG/G{CREAT}
NITRITE UR QL STRIP.AUTO: NEGATIVE
NON HDL CHOLESTEROL: 103 MG/DL (ref 0–149)
PH UR STRIP.AUTO: 5 [PH]
POTASSIUM SERPL-SCNC: 4 MMOL/L (ref 3.5–5.3)
PROT SERPL-MCNC: 7.2 G/DL (ref 6.4–8.2)
PROT UR STRIP.AUTO-MCNC: NEGATIVE MG/DL
PSA SERPL-MCNC: 1.13 NG/ML
RBC # UR STRIP.AUTO: NEGATIVE /UL
SODIUM SERPL-SCNC: 142 MMOL/L (ref 136–145)
SP GR UR STRIP.AUTO: 1.01
TRIGL SERPL-MCNC: 217 MG/DL (ref 0–149)
UROBILINOGEN UR STRIP.AUTO-MCNC: NORMAL MG/DL
VLDL: 43 MG/DL (ref 0–40)

## 2024-05-07 PROCEDURE — 1159F MED LIST DOCD IN RCRD: CPT | Performed by: FAMILY MEDICINE

## 2024-05-07 PROCEDURE — 80053 COMPREHEN METABOLIC PANEL: CPT

## 2024-05-07 PROCEDURE — 36415 COLL VENOUS BLD VENIPUNCTURE: CPT

## 2024-05-07 PROCEDURE — 1170F FXNL STATUS ASSESSED: CPT | Performed by: FAMILY MEDICINE

## 2024-05-07 PROCEDURE — 83036 HEMOGLOBIN GLYCOSYLATED A1C: CPT

## 2024-05-07 PROCEDURE — 1160F RVW MEDS BY RX/DR IN RCRD: CPT | Performed by: FAMILY MEDICINE

## 2024-05-07 PROCEDURE — 81003 URINALYSIS AUTO W/O SCOPE: CPT

## 2024-05-07 PROCEDURE — 3075F SYST BP GE 130 - 139MM HG: CPT | Performed by: FAMILY MEDICINE

## 2024-05-07 PROCEDURE — 99214 OFFICE O/P EST MOD 30 MIN: CPT | Performed by: FAMILY MEDICINE

## 2024-05-07 PROCEDURE — G0439 PPPS, SUBSEQ VISIT: HCPCS | Performed by: FAMILY MEDICINE

## 2024-05-07 PROCEDURE — 82043 UR ALBUMIN QUANTITATIVE: CPT

## 2024-05-07 PROCEDURE — G0444 DEPRESSION SCREEN ANNUAL: HCPCS | Performed by: FAMILY MEDICINE

## 2024-05-07 PROCEDURE — 1126F AMNT PAIN NOTED NONE PRSNT: CPT | Performed by: FAMILY MEDICINE

## 2024-05-07 PROCEDURE — 3052F HG A1C>EQUAL 8.0%<EQUAL 9.0%: CPT | Performed by: FAMILY MEDICINE

## 2024-05-07 PROCEDURE — 3078F DIAST BP <80 MM HG: CPT | Performed by: FAMILY MEDICINE

## 2024-05-07 PROCEDURE — 4010F ACE/ARB THERAPY RXD/TAKEN: CPT | Performed by: FAMILY MEDICINE

## 2024-05-07 PROCEDURE — 82570 ASSAY OF URINE CREATININE: CPT

## 2024-05-07 PROCEDURE — 3048F LDL-C <100 MG/DL: CPT | Performed by: FAMILY MEDICINE

## 2024-05-07 PROCEDURE — G0103 PSA SCREENING: HCPCS

## 2024-05-07 PROCEDURE — 80061 LIPID PANEL: CPT

## 2024-05-07 PROCEDURE — 85025 COMPLETE CBC W/AUTO DIFF WBC: CPT

## 2024-05-07 PROCEDURE — 99497 ADVNCD CARE PLAN 30 MIN: CPT | Performed by: FAMILY MEDICINE

## 2024-05-07 PROCEDURE — 1036F TOBACCO NON-USER: CPT | Performed by: FAMILY MEDICINE

## 2024-05-07 ASSESSMENT — ACTIVITIES OF DAILY LIVING (ADL)
GROCERY_SHOPPING: INDEPENDENT
MANAGING_FINANCES: INDEPENDENT
BATHING: INDEPENDENT
DRESSING: INDEPENDENT
TAKING_MEDICATION: INDEPENDENT
DOING_HOUSEWORK: INDEPENDENT

## 2024-05-07 ASSESSMENT — PATIENT HEALTH QUESTIONNAIRE - PHQ9
2. FEELING DOWN, DEPRESSED OR HOPELESS: NOT AT ALL
SUM OF ALL RESPONSES TO PHQ9 QUESTIONS 1 AND 2: 0
1. LITTLE INTEREST OR PLEASURE IN DOING THINGS: NOT AT ALL

## 2024-05-07 ASSESSMENT — ENCOUNTER SYMPTOMS
LOSS OF SENSATION IN FEET: 0
DEPRESSION: 0
OCCASIONAL FEELINGS OF UNSTEADINESS: 0

## 2024-05-07 ASSESSMENT — PAIN SCALES - GENERAL: PAINLEVEL: 0-NO PAIN

## 2024-05-07 NOTE — PROGRESS NOTES
Subjective   Reason for Visit: Isaiah Jin is an 68 y.o. male here for a Medicare Wellness visit.     Past Medical, Surgical, and Family History reviewed and updated in chart.    Reviewed all medications by prescribing practitioner or clinical pharmacist (such as prescriptions, OTCs, herbal therapies and supplements) and documented in the medical record.    HPI  68-year-old male presents for Medicare wellness visit.  No acute complaint.  Stroke symptoms are stable.  He needs a new referral for speech therapy.  Patient Care Team:  Kwabena ZIMMERMAN DO as PCP - General  Kwab[facility] ZIMMERMAN DO [facility]  Anuj Ham MD as Consulting Physician (Cardiology)  Judi Fofana MA as Care Manager (Case Management)     Review of Systems  Constitutional: no chills, no fever and no night sweats.   Eyes: no blurred vision and no eyesight problems.   ENT: no hearing loss, no nasal congestion, no nasal discharge, no hoarseness and no sore throat.   Cardiovascular: no chest pain, no intermittent leg claudication, no lower extremity edema, no palpitations and no syncope.   Respiratory: no cough, no shortness of breath during exertion, no shortness of breath at rest and no wheezing.   Gastrointestinal: no abdominal pain, no blood in stools, no constipation, no diarrhea, no melena, no nausea, no rectal pain and no vomiting.   Genitourinary: no dysuria, no change in urinary frequency, no urinary hesitancy and no feelings of urinary urgency.   Neurological: no difficulty walking, no headache, no limb weakness, no numbness and no tingling.   Psychiatric: no anxiety, no depression, no anhedonia and no substance use disorders.   Endocrine: no recent weight gain and no recent weight loss.   Hematologic/Lymphatic: no tendency for easy bruising and no swollen glands.  Objective   Vitals:  /74 (BP Location: Right arm, Patient Position: Sitting, BP Cuff Size: Adult)   Pulse 74   Temp 35.4 °C (95.7 °F)  "(Temporal)   Ht 1.727 m (5' 8\")   Wt 82.7 kg (182 lb 6.4 oz)   SpO2 94%   BMI 27.73 kg/m²       Physical Exam  Constitutional: Alert and in no acute distress. Well developed, well nourished.   Eyes: Pupils were equal in size, round, reactive to light (PERRL) with normal accommodation and extraocular movements intact (EOMI). Ophthalmoscopic examination: Normal.   Ears, Nose, Mouth, and Throat: External inspection of ears and nose: Normal. Otoscopic examination: Normal. Lips, teeth, and gums: Normal. Oropharynx: Normal.   Neck: No neck mass was observed. Supple. Thyroid not enlarged and there were no palpable thyroid nodules.   Cardiovascular: Heart rate and rhythm were normal, normal S1 and S2, no gallops, no murmurs and no pericardial rub. Carotid pulses: Normal with no bruits. Abdominal aorta: Normal. Pedal pulses: Normal. No peripheral edema.   Pulmonary: No respiratory distress. Clear bilateral breath sounds.   Abdomen: Soft nontender; no abdominal mass palpated. Normal bowel sounds. No organomegaly.   Skin: Normal skin color and pigmentation, normal skin turgor, and no rash.   Psychiatric: Judgment and insight: Intact. Mood and affect: Normal.  Assessment/Plan   Problem List Items Addressed This Visit       Cardioembolic stroke (Multi)    Cardiomyopathy, unspecified (Multi) (Chronic)    Overview     EF 35-40%, now 40-45%         Cerebrovascular accident (CVA) (Multi) (Chronic)    Overview     Post OHS 12/2020 with left-sided visual deficit, recurrent CVA 8/2022. ILANA negative. Now on Eliquis and Plavix.         Type 2 diabetes mellitus without complications (Multi) (Chronic)    Relevant Orders    Hemoglobin A1c    Albumin, urine, random    Essential (primary) hypertension (Chronic)    Relevant Orders    Lipid Panel    Comprehensive Metabolic Panel    CBC and Auto Differential    Urinalysis with Reflex Microscopic    Screening for prostate cancer    Relevant Orders    Prostate Specific Antigen, Screen    " Thoracic aortic aneurysm (CMS-HCC) (Chronic)    Overview     4.6 cm s/p #32 Gelweave aortic graft         Right hemisphere, cerebral infarction (Multi) (Chronic)    Acute pulmonary edema (Multi)    Heart failure (Multi)    Hemiplegia and hemiparesis following cerebral infarction affecting left non-dominant side (Multi)    Dependence on renal dialysis (CMS-[facility])    Convulsions, unspecified convulsion type (Multi)    Seizure (Multi)    Thoracic aortic aneurysm (TAA) (CMS-HCC)    Overview     Comment on above: 4.6 cm s/p #32 Gelweave Aortic graft;         Acute CVA (cerebrovascular accident) (Multi)    Relevant Orders    Referral to Speech Therapy    Routine general medical examination at health care facility - Primary    Relevant Orders    1 Year Follow Up In Primary Care - Wellness Exam    End stage renal disease (Multi)

## 2024-05-07 NOTE — ACP (ADVANCE CARE PLANNING)
Confirming Previous Code Status:   Advance Care Planning Note     Discussion Date: 05/07/24   Discussion Participants: patient    The patient wishes to discuss Advance Care Planning today and the following is a brief summary of our discussion.     Patient has capacity to make their own medical decisions: Yes  Health Care Agent/Surrogate Decision Maker documented in chart: Yes    Documents on file and valid:  Advance Directive/Living Will: Yes   Health Care Power of : Yes  Other:     Communication of Medical Status/Prognosis:        Communication of Treatment Goals/Options:        Treatment Decisions  Goals of Care: quality of life is prioritized; willing to accept low-burden treatments to achieve meaningful goals     Follow Up Plan    Team Members    Time Statement: Total face to face time spent on advance care planning was 16 minutes with 16 minutes spent in counseling, including the explanation.    Kwabena Gomez DO  5/7/2024 9:54 AM

## 2024-05-08 LAB
BASOPHILS # BLD AUTO: 0.05 X10*3/UL (ref 0–0.1)
BASOPHILS NFR BLD AUTO: 0.7 %
EOSINOPHIL # BLD AUTO: 0.26 X10*3/UL (ref 0–0.7)
EOSINOPHIL NFR BLD AUTO: 3.8 %
ERYTHROCYTE [DISTWIDTH] IN BLOOD BY AUTOMATED COUNT: 13.2 % (ref 11.5–14.5)
EST. AVERAGE GLUCOSE BLD GHB EST-MCNC: 160 MG/DL
HBA1C MFR BLD: 7.2 %
HCT VFR BLD AUTO: 41 % (ref 41–52)
HGB BLD-MCNC: 13.1 G/DL (ref 13.5–17.5)
IMM GRANULOCYTES # BLD AUTO: 0.03 X10*3/UL (ref 0–0.7)
IMM GRANULOCYTES NFR BLD AUTO: 0.4 % (ref 0–0.9)
LYMPHOCYTES # BLD AUTO: 1.43 X10*3/UL (ref 1.2–4.8)
LYMPHOCYTES NFR BLD AUTO: 20.8 %
MCH RBC QN AUTO: 30.6 PG (ref 26–34)
MCHC RBC AUTO-ENTMCNC: 32 G/DL (ref 32–36)
MCV RBC AUTO: 96 FL (ref 80–100)
MONOCYTES # BLD AUTO: 0.67 X10*3/UL (ref 0.1–1)
MONOCYTES NFR BLD AUTO: 9.8 %
NEUTROPHILS # BLD AUTO: 4.42 X10*3/UL (ref 1.2–7.7)
NEUTROPHILS NFR BLD AUTO: 64.5 %
NRBC BLD-RTO: 0 /100 WBCS (ref 0–0)
PLATELET # BLD AUTO: 284 X10*3/UL (ref 150–450)
RBC # BLD AUTO: 4.28 X10*6/UL (ref 4.5–5.9)
WBC # BLD AUTO: 6.9 X10*3/UL (ref 4.4–11.3)

## 2024-05-09 PROBLEM — Z98.890 HISTORY OF OPEN HEART SURGERY: Status: RESOLVED | Noted: 2024-01-30 | Resolved: 2024-05-09

## 2024-05-09 PROBLEM — I71.20 THORACIC AORTIC ANEURYSM (TAA) (CMS-HCC): Status: RESOLVED | Noted: 2023-08-23 | Resolved: 2024-05-09

## 2024-05-09 PROBLEM — Z95.828 S/P ASCENDING AORTIC REPLACEMENT: Status: RESOLVED | Noted: 2023-08-23 | Resolved: 2024-05-09

## 2024-05-09 PROBLEM — I42.9 CARDIOMYOPATHY, UNSPECIFIED (MULTI): Chronic | Status: RESOLVED | Noted: 2022-09-11 | Resolved: 2024-05-09

## 2024-05-14 ENCOUNTER — PATIENT OUTREACH (OUTPATIENT)
Dept: PRIMARY CARE | Facility: CLINIC | Age: 69
End: 2024-05-14
Payer: MEDICARE

## 2024-05-14 NOTE — PROGRESS NOTES
Call regarding appt. with PCP on 5/7/2024 after hospitalization.  At time of outreach call the patient feels as if their condition has improved since last visit.  Reviewed the PCP appointment with the pt and addressed any questions or concerns.  Spoke with Latricia. She states Duane seems to be doing well and PCP referred for some additional ST.

## 2024-05-16 ENCOUNTER — OFFICE VISIT (OUTPATIENT)
Dept: ORTHOPEDIC SURGERY | Facility: CLINIC | Age: 69
End: 2024-05-16
Payer: MEDICARE

## 2024-05-16 DIAGNOSIS — M17.11 ARTHRITIS OF RIGHT KNEE: Primary | ICD-10-CM

## 2024-05-16 DIAGNOSIS — M25.561 CHRONIC PAIN OF RIGHT KNEE: ICD-10-CM

## 2024-05-16 DIAGNOSIS — G89.29 CHRONIC PAIN OF RIGHT KNEE: ICD-10-CM

## 2024-05-16 PROCEDURE — 20610 DRAIN/INJ JOINT/BURSA W/O US: CPT | Performed by: ORTHOPAEDIC SURGERY

## 2024-05-16 PROCEDURE — 3048F LDL-C <100 MG/DL: CPT | Performed by: ORTHOPAEDIC SURGERY

## 2024-05-16 PROCEDURE — 1036F TOBACCO NON-USER: CPT | Performed by: ORTHOPAEDIC SURGERY

## 2024-05-16 PROCEDURE — 99213 OFFICE O/P EST LOW 20 MIN: CPT | Performed by: ORTHOPAEDIC SURGERY

## 2024-05-16 PROCEDURE — 3062F POS MACROALBUMINURIA REV: CPT | Performed by: ORTHOPAEDIC SURGERY

## 2024-05-16 PROCEDURE — 4010F ACE/ARB THERAPY RXD/TAKEN: CPT | Performed by: ORTHOPAEDIC SURGERY

## 2024-05-16 PROCEDURE — 1160F RVW MEDS BY RX/DR IN RCRD: CPT | Performed by: ORTHOPAEDIC SURGERY

## 2024-05-16 PROCEDURE — 3051F HG A1C>EQUAL 7.0%<8.0%: CPT | Performed by: ORTHOPAEDIC SURGERY

## 2024-05-16 PROCEDURE — 1159F MED LIST DOCD IN RCRD: CPT | Performed by: ORTHOPAEDIC SURGERY

## 2024-05-16 RX ORDER — TRIAMCINOLONE ACETONIDE 40 MG/ML
40 INJECTION, SUSPENSION INTRA-ARTICULAR; INTRAMUSCULAR
Status: COMPLETED | OUTPATIENT
Start: 2024-05-16 | End: 2024-05-16

## 2024-05-16 RX ORDER — LIDOCAINE HYDROCHLORIDE 10 MG/ML
2 INJECTION INFILTRATION; PERINEURAL
Status: COMPLETED | OUTPATIENT
Start: 2024-05-16 | End: 2024-05-16

## 2024-05-16 RX ADMIN — LIDOCAINE HYDROCHLORIDE 2 ML: 10 INJECTION INFILTRATION; PERINEURAL at 09:34

## 2024-05-16 RX ADMIN — TRIAMCINOLONE ACETONIDE 40 MG: 40 INJECTION, SUSPENSION INTRA-ARTICULAR; INTRAMUSCULAR at 09:34

## 2024-05-16 ASSESSMENT — ENCOUNTER SYMPTOMS: KNEE SWELLING: 1

## 2024-05-16 NOTE — PROGRESS NOTES
"Subjective    Patient ID: Isaiah Jin \"Duane\" is a 68 y.o. male.    Chief Complaint: Follow-up of the Right Knee       68-year-old male who with a history of arthritis involving the right knee.  Nearly 5 months ago a right knee intra-articular injection of Kenalog was of dramatic benefit.  Pain now has returned.  Stiffness after prolonged sitting.  Pain which limits functions of ADL such as standing, walking and stair climbing.  There has been no new injury.  He has not noted any redness warmth and denies any fevers or chills.    This patient's past medical, social, and family history were reviewed as well as a review of systems including updates on the patient's information encounter sheet  In February of this year the patient has what he describes as another mini stroke but has recovered well from this event    Alert and oriented male seated on the exam table  No apparent distress. Normal affect and decision making. Skin intact without redness, warmth or rash. Regular respiration rate which is not labored.  Somewhat slender and build  Satisfactory motion of the right hip without groin or thigh pain elicited  Right knee reveals a varus alignment of 5 degrees which corrects a few degrees to a valgus stress.  Lack of extension of 5 degrees and flexion of 115 degrees  Patellofemoral crepitus with range of motion  Mild medial joint line tenderness  No erythema identified but perhaps a small effusion    Assessment: Arthritis right knee    Plan: An extended discussion ensued with the patient regarding the treatment options for their knee condition. This included both nonoperative and operative treatment options.. The patient will continue with modifications of activities of daily living as well as an exercise program. As the patient desired an intra-articular knee injection of Kenalog/lidocaine was performed today and tolerated well.. The patient will observe to see if the injection is of benefit. Follow-up on a " when necessary basis.    Right Knee     L Inj/Asp: R knee on 5/16/2024 9:34 AM  Indications: pain  Details: 22 G needle, anterolateral approach  Medications: 40 mg triamcinolone acetonide 40 mg/mL; 2 mL lidocaine 10 mg/mL (1 %)  Consent was given by the patient. Patient was prepped and draped in the usual sterile fashion.               Current Outpatient Medications:     atorvastatin (Lipitor) 80 mg tablet, TAKE 1 TABLET BY MOUTH EVERY DAY (Patient taking differently: Take 1 tablet (80 mg) by mouth once daily in the morning.), Disp: 90 tablet, Rfl: 3    clopidogrel (Plavix) 75 mg tablet, TAKE 1 TABLET BY MOUTH EVERY DAY (Patient taking differently: Take 1 tablet (75 mg) by mouth once daily in the morning.), Disp: 90 tablet, Rfl: 1    cyanocobalamin, vitamin B-12, (Vitamin B-12) 1,000 mcg tablet extended release, Take 1 tablet (1,000 mcg) by mouth once daily in the morning., Disp: , Rfl:     dapagliflozin propanediol (Farxiga) 5 mg, Take 1 tablet (5 mg) by mouth once daily., Disp: 30 tablet, Rfl: 3    fenofibrate (Triglide) 160 mg tablet, TAKE 1 TABLET BY MOUTH EVERY DAY (Patient taking differently: Take 1 tablet (160 mg) by mouth once daily in the morning.), Disp: 90 tablet, Rfl: 3    furosemide (Lasix) 40 mg tablet, TAKE 1 TABLET BY MOUTH EVERY DAY (Patient taking differently: Take 1 tablet (40 mg) by mouth once daily in the morning.), Disp: 90 tablet, Rfl: 2    insulin glargine-yfgn 100 unit/mL (3 mL) Pen, Inject 14 Units under the skin once every 24 hours., Disp: 15 mL, Rfl: 2    losartan (Cozaar) 25 mg tablet, TAKE 1 TABLET BY MOUTH EVERY DAY AS DIRECTED, Disp: 90 tablet, Rfl: 3    metFORMIN  mg 24 hr tablet, TAKE 1 TABLET BY MOUTH DAILY WITH EVENING MEAL, Disp: 90 tablet, Rfl: 3    metoprolol succinate XL (Toprol-XL) 50 mg 24 hr tablet, TAKE 1 TABLET BY MOUTH EVERY DAY (Patient taking differently: Take 1 tablet (50 mg) by mouth once daily in the morning.), Disp: 90 tablet, Rfl: 3    multivitamin with  "minerals (multivit-min-iron fum-folic ac) tablet, Take 1 tablet by mouth once daily in the morning., Disp: , Rfl:     pen needle, diabetic (BD Ultra-Fine Kerry Pen Needle) 32 gauge x 5/32\" needle, AS DIRECTED, Disp: 100 each, Rfl: 3    QUEtiapine (SEROquel) 25 mg tablet, Take 1 tablet (25 mg) by mouth once daily at bedtime., Disp: 90 tablet, Rfl: 3    tamsulosin (Flomax) 0.4 mg 24 hr capsule, TAKE 1 CAPSULE BY MOUTH EVERYDAY AT BEDTIME, Disp: 90 capsule, Rfl: 1    Xarelto 20 mg tablet, Take 1 tablet (20 mg) by mouth once daily in the evening., Disp: 90 tablet, Rfl: 3      "

## 2024-05-24 ENCOUNTER — OFFICE VISIT (OUTPATIENT)
Dept: CARDIOLOGY | Facility: CLINIC | Age: 69
End: 2024-05-24
Payer: MEDICARE

## 2024-05-24 VITALS
WEIGHT: 176 LBS | SYSTOLIC BLOOD PRESSURE: 122 MMHG | HEART RATE: 57 BPM | DIASTOLIC BLOOD PRESSURE: 76 MMHG | OXYGEN SATURATION: 96 % | BODY MASS INDEX: 26.76 KG/M2

## 2024-05-24 DIAGNOSIS — I34.0 NONRHEUMATIC MITRAL VALVE REGURGITATION: Chronic | ICD-10-CM

## 2024-05-24 DIAGNOSIS — E78.2 MIXED HYPERLIPIDEMIA: ICD-10-CM

## 2024-05-24 DIAGNOSIS — I71.21 ANEURYSM OF ASCENDING AORTA WITHOUT RUPTURE (CMS-HCC): Chronic | ICD-10-CM

## 2024-05-24 DIAGNOSIS — I10 ESSENTIAL (PRIMARY) HYPERTENSION: Chronic | ICD-10-CM

## 2024-05-24 DIAGNOSIS — I25.10 CORONARY ARTERY DISEASE INVOLVING NATIVE HEART WITHOUT ANGINA PECTORIS, UNSPECIFIED VESSEL OR LESION TYPE: Chronic | ICD-10-CM

## 2024-05-24 DIAGNOSIS — I25.5 ISCHEMIC CARDIOMYOPATHY: ICD-10-CM

## 2024-05-24 DIAGNOSIS — I10 PRIMARY HYPERTENSION: ICD-10-CM

## 2024-05-24 DIAGNOSIS — I35.0 NONRHEUMATIC AORTIC VALVE STENOSIS: Primary | Chronic | ICD-10-CM

## 2024-05-24 DIAGNOSIS — I50.42 CHRONIC COMBINED SYSTOLIC AND DIASTOLIC HEART FAILURE (MULTI): ICD-10-CM

## 2024-05-24 PROCEDURE — 99215 OFFICE O/P EST HI 40 MIN: CPT | Performed by: INTERNAL MEDICINE

## 2024-05-24 PROCEDURE — 3048F LDL-C <100 MG/DL: CPT | Performed by: INTERNAL MEDICINE

## 2024-05-24 PROCEDURE — 1036F TOBACCO NON-USER: CPT | Performed by: INTERNAL MEDICINE

## 2024-05-24 PROCEDURE — 1159F MED LIST DOCD IN RCRD: CPT | Performed by: INTERNAL MEDICINE

## 2024-05-24 PROCEDURE — 3051F HG A1C>EQUAL 7.0%<8.0%: CPT | Performed by: INTERNAL MEDICINE

## 2024-05-24 PROCEDURE — 3078F DIAST BP <80 MM HG: CPT | Performed by: INTERNAL MEDICINE

## 2024-05-24 PROCEDURE — 3074F SYST BP LT 130 MM HG: CPT | Performed by: INTERNAL MEDICINE

## 2024-05-24 PROCEDURE — 1160F RVW MEDS BY RX/DR IN RCRD: CPT | Performed by: INTERNAL MEDICINE

## 2024-05-24 PROCEDURE — 3062F POS MACROALBUMINURIA REV: CPT | Performed by: INTERNAL MEDICINE

## 2024-05-24 NOTE — PATIENT INSTRUCTIONS
1. CAD. Bypass December 2020 LIMA to the LAD SVG to the PDA OM1 with a Y graft to the OM 2. At the same time he had an AVR and aortic root replacement. Doing well. Post-op CVA. Doing great. Continue Plavix. No symptoms of angina.  Physically remains active although is not exercising.     2. Aortic valve replacement.  This was done due to a bicuspid aortic valve.  He had a large Perceval bioprosthetic aortic valve replacement.  I reviewed his recent echo.  The valve is well-seated.    3. Thoracic aortic aneurysm measuring 4.6 cm. At the time of surgery was noted to be 5.2 cm. Status post replacement with a #32 Gelweave graft. There is mild atherosclerotic disease on the ILANA.     4. Cardiomyopathy. Preoperative ejection fraction 35%. Postoperative ejection fraction 40 to 45%. On his repeat echo, the ejection fraction is once again 40 to 45%. There is severe hypokinesis to akinesis of the anterior wall consistent with his known totally occluded LAD. These findings were confirmed on the ILANA September 2022.  I reviewed his echo from his recent hospitalization.  His ejection fraction is 45%.  I will stop his losartan.  Will start Entresto 24/26 mg.  Continue with metoprolol succinate and Farxiga.  [place] heart association class II stage B.      5. CVA-Post-op from [facility]. He subsequently had a repeat CVA September 2022. The etiology was not clear. He is being evaluated by stroke neurology. A ILANA did not reveal a clear etiology though he did have an anteroapical wall motion abnormality which certainly put him at higher risk of mural thrombi.  This without reason that he is on Xarelto.  Will continue clopidogrel.  Possible repeat neurologic events when he was in the hospital in February.  No clear etiology identified.    6. Hyperlipidemia.  Followed by PCP.  5/7/2024 LDL 60 HDL 34 triglycerides 217    7. Hypertension.  Blood pressures are excellent today    Stop losartan for 2 days.  Start Entresto when its available.   Continue remainder of his medications.  I will have him return to see me in 6 months.

## 2024-05-24 NOTE — PROGRESS NOTES
Referred by No ref. provider found    HPI admitted to the hospital February 2024 with neurologic issues.  Echo reviewed I personally reviewed..  No clots were noted.  I have spoken with his wife over the phone and his daughter here.  It still not clear if he actually did have a stroke.  He did not receive thrombolytics.  He has been evaluated by neurology at [facility].  He remains on clopidogrel along with Xarelto.    Past Medical History:  Problem List Items Addressed This Visit    None       Past Medical History:   Diagnosis Date    Aortic stenosis 05/17/2023    Bicuspid AV moderate, s/p Large [place] AVR 12/2020    CAD (coronary artery disease) 05/17/2023    Elevated CAC @ 1353, s/p CABG 12/2020 with OIMA-LAD, SVG-PDA, OM1 Y graft to OM2    Cardiomyopathy, unspecified (Multi) 09/11/2022    EF 35-40%, now 40-45%    Cerebrovascular accident (CVA) (Multi) 05/17/2023    Post OHS 12/2020 with left-sided visual deficit, recurrent CVA 8/2022. ILANA negative. Now on Eliquis and Plavix.    Essential (primary) hypertension 09/11/2022    Mitral valve regurgitation 08/23/2023    Mild to moderate    Mixed hyperlipidemia 08/23/2023    Dr. Gomez following    Other specified health status     No known health problems    Right hemisphere, cerebral infarction (Multi) 09/11/2022    S/P AVR (aortic valve replacement) 08/23/2023    Thoracic aortic aneurysm (CMS-[facility]) 08/23/2023    4.6 cm s/p #32 Gelweave aortic graft    Type 2 diabetes mellitus without complications (Multi) 09/11/2022    Unspecified sequelae of cerebral infarction 07/23/2021    H/O: stroke with residual effects             Past Surgical History:  He has a past surgical history that includes Other surgical history (02/01/2021); CT angio head w and wo IV contrast (1/5/2021); CT angio neck (1/5/2021); CT angio head w and wo IV contrast (8/18/2022); and CT angio neck (8/18/2022).      Social History:  He reports that he has never smoked. He has never been exposed to  "tobacco smoke. He has never used smokeless tobacco. He reports that he does not currently use alcohol. He reports that he does not use drugs.    Family History:  Family History   Problem Relation Name Age of Onset    Coronary artery disease Father      No Known Problems Sibling          Allergies:  Patient has no known allergies.    Outpatient Medications:  Current Outpatient Medications   Medication Instructions    atorvastatin (LIPITOR) 80 mg, oral, Daily    clopidogrel (Plavix) 75 mg tablet TAKE 1 TABLET BY MOUTH EVERY DAY    cyanocobalamin, vitamin B-12, (Vitamin B-12) 1,000 mcg tablet extended release 1 tablet, oral, Every morning    Farxiga 5 mg, oral, Daily    fenofibrate (TRIGLIDE) 160 mg, oral, Every morning    furosemide (LASIX) 40 mg, oral, Daily    losartan (COZAAR) 25 mg, oral, Daily, as directed    metFORMIN XR (GLUCOPHAGE-XR) 500 mg, oral, Daily, take with evening meal    metoprolol succinate XL (TOPROL-XL) 50 mg, oral, Daily    multivitamin with minerals (multivit-min-iron fum-folic ac) tablet 1 tablet, oral, Every morning    pen needle, diabetic (BD Ultra-Fine Kerry Pen Needle) 32 gauge x 5/32\" needle AS DIRECTED    QUEtiapine (SEROQUEL) 25 mg, oral, Nightly    Semglee(insulin glarg-yfgn)Pen 14 Units, subcutaneous, Every 24 hours    tamsulosin (Flomax) 0.4 mg 24 hr capsule TAKE 1 CAPSULE BY MOUTH EVERYDAY AT BEDTIME    Xarelto 20 mg, oral, Every evening        Last Recorded Vitals:  There were no vitals filed for this visit.    Physical Exam    Physical  Patient is alert and oriented x3.  HEENT is unremarkable mucous members are moist  Neck no JVP no bruits upstrokes are full no thyromegaly  Lungs are clear bilaterally.  No wheezing crackles or rales  Heart regular rhythm normal S1-S2 there is no S3 1-2/6 ANDRES  Abdomen is soft vessels are positive nontender nondistended no organomegaly no pulsatile masses  Extremities have no edema.  Distal pulses present palpable.  Neuro is grossly nonfocal  Skin has " no rashes     Last Labs:  CBC -  Lab Results   Component Value Date    WBC 6.9 05/07/2024    HGB 13.1 (L) 05/07/2024    HCT 41.0 05/07/2024    MCV 96 05/07/2024     05/07/2024       CMP -  Lab Results   Component Value Date    CALCIUM 9.9 05/07/2024    PHOS 4.0 02/13/2024    PROT 7.2 05/07/2024    ALBUMIN 4.6 05/07/2024    AST 16 05/07/2024    ALT 15 05/07/2024    ALKPHOS 41 05/07/2024    BILITOT 0.5 05/07/2024       LIPID PANEL -   Lab Results   Component Value Date    CHOL 137 05/07/2024    HDL 34.0 05/07/2024    CHHDL 4.0 05/07/2024    VLDL 43 (H) 05/07/2024    TRIG 217 (H) 05/07/2024    NHDL 103 05/07/2024       RENAL FUNCTION PANEL -   Lab Results   Component Value Date    K 4.0 05/07/2024    PHOS 4.0 02/13/2024       Lab Results   Component Value Date    BNP 39 08/18/2022    HGBA1C 7.2 (H) 05/07/2024    HGBA1C 8.7 (A) 08/23/2023    HGBA1C 7.6 12/03/2021     Procedure    Event recorder 2/13/2024 average heart rate 62 bpm no atrial fibrillation    Echo 2/12/2024 EF 45%, multiple wall motion abnormalities involving the septal inferior lateral segments apex is dyskinetic, well-seated bioprosthetic aortic valve    Echo 8/2/23 EF 40%, HK apex, AVR 20/10mmHg    ILANA [09/08/2022]: EF 40-45%. Multiple WMAs. Distal anterior / anteroapical / inferoapical walls are hypokinetic (unchanged from prior study). No LV thrombus vis'd. Mod cLVH. Bioprosthetic AV. No ev/of PFO. No LA mass / thrombus. Plaque vis'd in transverse & descending aorta. No valvular vegetations.      EVENT MONITOR [08/19/2022 - 09/01/2022]: SR w/avg HR 71. No ep/of A-fib / PSVT / high-grade AV block. One ep/of nonsust VT.      ECHO [08/19/2022]: LVSF was not assessed.      ECHO [07/27/2022]: EF 40-45%. Apical septal segment, apical anterior segment and apex are abnormal. Abnormal septal motion c/w postop status. SD = impaired relaxation pattern. Mod cLVH. Bioprosthetic AV. Global hypok.      EVENT MONITOR [08/25/2021 â€“ 09/23/2021]: SR, avg HR 68.  No ep/of A-fib / PSVT / high-grade AV block. One ep/of nonsust VT.     CABG [01/04/2021, Dr. Stu Álvarez]: x4 = LIMA to LAD, SVG to PDA, SVG to first circumflex and Y to second circumflex. Aortic valve replacement with Perceval Large. Ascending aorta replacement with Gelweave graft 32 mm.     CATH [12/14/2020, Dr. Anuj Ham]: LAD occluded, 85% ramus intermedius, occluded second marginal, ostial 85% stenosis of the RCA, 75 to 80% proximal PDA and posterior lateral branch stenosis, normal right heart filling pressures     PHARM NST [11/18/2020]: Abnormal = ev/for prior distal septal and apical wall MI w/o residual jose-infarct ischemia. WMAs w/severely reduced calculated EF of 30%.      CT / SCORING [11/16/2020] = 1353 (LM 53.5, .2, LCx 243.7, .6). Aneurysmal dilatation of ascending thoracic aorta (4.6 cm).          Assessment/Plan   1. CAD. Bypass December 2020 LIMA to the LAD SVG to the PDA OM1 with a Y graft to the OM 2. At the same time he had an AVR and aortic root replacement. Doing well. Post-op CVA. Doing great. Continue Plavix. No symptoms of angina.  Physically remains active although is not exercising.     2. Aortic valve replacement.  This was done due to a bicuspid aortic valve.  He had a large Perceval bioprosthetic aortic valve replacement.  I reviewed his recent echo.  The valve is well-seated.    3. Thoracic aortic aneurysm measuring 4.6 cm. At the time of surgery was noted to be 5.2 cm. Status post replacement with a #32 Gelweave graft. There is mild atherosclerotic disease on the ILANA.     4. Cardiomyopathy. Preoperative ejection fraction 35%. Postoperative ejection fraction 40 to 45%. On his repeat echo, the ejection fraction is once again 40 to 45%. There is severe hypokinesis to akinesis of the anterior wall consistent with his known totally occluded LAD. These findings were confirmed on the ILANA September 2022.  I reviewed his echo from his recent hospitalization.  His  ejection fraction is 45%.  I will stop his losartan.  Will start Entresto 24/26 mg.  Continue with metoprolol succinate and Farxiga.  [place] heart association class II stage B.      5. CVA-Post-op from [facility]. He subsequently had a repeat CVA September 2022. The etiology was not clear. He is being evaluated by stroke neurology. A ILANA did not reveal a clear etiology though he did have an anteroapical wall motion abnormality which certainly put him at higher risk of mural thrombi.  This without reason that he is on Xarelto.  Will continue clopidogrel.  Possible repeat neurologic events when he was in the hospital in February.  No clear etiology identified.    6. Hyperlipidemia.  Followed by PCP.  5/7/2024 LDL 60 HDL 34 triglycerides 217    7. Hypertension.  Blood pressures are excellent today    Stop losartan for 2 days.  Start Entresto when its available.  Continue remainder of his medications.  I will have him return to see me in 6 months.        Anuj Ham MD     Instructions and follow up

## 2024-05-25 PROCEDURE — RXMED WILLOW AMBULATORY MEDICATION CHARGE

## 2024-05-28 ENCOUNTER — APPOINTMENT (OUTPATIENT)
Dept: CARDIOLOGY | Facility: CLINIC | Age: 69
End: 2024-05-28
Payer: MEDICARE

## 2024-05-29 PROCEDURE — RXMED WILLOW AMBULATORY MEDICATION CHARGE

## 2024-05-30 ENCOUNTER — PHARMACY VISIT (OUTPATIENT)
Dept: PHARMACY | Facility: CLINIC | Age: 69
End: 2024-05-30
Payer: COMMERCIAL

## 2024-06-24 ENCOUNTER — PHARMACY VISIT (OUTPATIENT)
Dept: PHARMACY | Facility: CLINIC | Age: 69
End: 2024-06-24
Payer: COMMERCIAL

## 2024-06-24 DIAGNOSIS — E11.9 TYPE 2 DIABETES MELLITUS WITHOUT COMPLICATION, WITH LONG-TERM CURRENT USE OF INSULIN (MULTI): Chronic | ICD-10-CM

## 2024-06-24 DIAGNOSIS — I50.9 HEART FAILURE, UNSPECIFIED HF CHRONICITY, UNSPECIFIED HEART FAILURE TYPE (MULTI): ICD-10-CM

## 2024-06-24 DIAGNOSIS — Z79.4 TYPE 2 DIABETES MELLITUS WITHOUT COMPLICATION, WITH LONG-TERM CURRENT USE OF INSULIN (MULTI): Chronic | ICD-10-CM

## 2024-06-24 PROCEDURE — RXMED WILLOW AMBULATORY MEDICATION CHARGE

## 2024-06-24 RX ORDER — DAPAGLIFLOZIN 5 MG/1
5 TABLET, FILM COATED ORAL DAILY
Qty: 30 TABLET | Refills: 3 | Status: CANCELLED | OUTPATIENT
Start: 2024-06-24 | End: 2025-06-24

## 2024-07-06 DIAGNOSIS — I50.9 HEART FAILURE, UNSPECIFIED HF CHRONICITY, UNSPECIFIED HEART FAILURE TYPE (MULTI): ICD-10-CM

## 2024-07-06 DIAGNOSIS — Z79.4 TYPE 2 DIABETES MELLITUS WITHOUT COMPLICATION, WITH LONG-TERM CURRENT USE OF INSULIN (MULTI): Chronic | ICD-10-CM

## 2024-07-06 DIAGNOSIS — E11.9 TYPE 2 DIABETES MELLITUS WITHOUT COMPLICATION, WITH LONG-TERM CURRENT USE OF INSULIN (MULTI): Chronic | ICD-10-CM

## 2024-07-06 RX ORDER — DAPAGLIFLOZIN 5 MG/1
5 TABLET, FILM COATED ORAL DAILY
Qty: 30 TABLET | Refills: 3 | Status: CANCELLED | OUTPATIENT
Start: 2024-06-24 | End: 2025-06-24

## 2024-07-08 ENCOUNTER — PHARMACY VISIT (OUTPATIENT)
Dept: PHARMACY | Facility: CLINIC | Age: 69
End: 2024-07-08
Payer: COMMERCIAL

## 2024-07-08 DIAGNOSIS — E11.9 TYPE 2 DIABETES MELLITUS WITHOUT COMPLICATION, WITH LONG-TERM CURRENT USE OF INSULIN (MULTI): Chronic | ICD-10-CM

## 2024-07-08 DIAGNOSIS — I50.9 HEART FAILURE, UNSPECIFIED HF CHRONICITY, UNSPECIFIED HEART FAILURE TYPE (MULTI): ICD-10-CM

## 2024-07-08 DIAGNOSIS — Z79.4 TYPE 2 DIABETES MELLITUS WITHOUT COMPLICATION, WITH LONG-TERM CURRENT USE OF INSULIN (MULTI): Chronic | ICD-10-CM

## 2024-07-08 PROCEDURE — RXMED WILLOW AMBULATORY MEDICATION CHARGE

## 2024-07-08 RX ORDER — LOSARTAN POTASSIUM 25 MG/1
25 TABLET ORAL DAILY
COMMUNITY
Start: 2024-05-25

## 2024-07-08 RX ORDER — DAPAGLIFLOZIN 5 MG/1
5 TABLET, FILM COATED ORAL DAILY
Qty: 30 TABLET | Refills: 3 | Status: SHIPPED | OUTPATIENT
Start: 2024-07-08 | End: 2025-07-08

## 2024-07-19 ENCOUNTER — HOSPITAL ENCOUNTER (OUTPATIENT)
Dept: CARDIOLOGY | Facility: HOSPITAL | Age: 69
Discharge: HOME | End: 2024-07-19
Payer: MEDICARE

## 2024-07-19 DIAGNOSIS — Z95.2 PRESENCE OF PROSTHETIC HEART VALVE: ICD-10-CM

## 2024-07-19 DIAGNOSIS — I25.10 CORONARY ARTERY DISEASE INVOLVING NATIVE HEART WITHOUT ANGINA PECTORIS, UNSPECIFIED VESSEL OR LESION TYPE: Primary | ICD-10-CM

## 2024-07-19 LAB
AORTIC VALVE MEAN GRADIENT: 6 MMHG
AORTIC VALVE PEAK VELOCITY: 1.63 M/S
AV PEAK GRADIENT: 10.6 MMHG
AVA (PEAK VEL): 2.26 CM2
AVA (VTI): 2.08 CM2
EJECTION FRACTION APICAL 4 CHAMBER: 34.9
EJECTION FRACTION: 48 %
LEFT ATRIUM VOLUME AREA LENGTH INDEX BSA: 20.5 ML/M2
LEFT VENTRICLE INTERNAL DIMENSION DIASTOLE: 5.3 CM (ref 3.5–6)
LEFT VENTRICULAR OUTFLOW TRACT DIAMETER: 2 CM
MITRAL VALVE E/A RATIO: 0.92
RIGHT VENTRICLE FREE WALL PEAK S': 9 CM/S
TRICUSPID ANNULAR PLANE SYSTOLIC EXCURSION: 1.9 CM

## 2024-07-19 PROCEDURE — 93306 TTE W/DOPPLER COMPLETE: CPT | Performed by: INTERNAL MEDICINE

## 2024-07-19 PROCEDURE — 2500000004 HC RX 250 GENERAL PHARMACY W/ HCPCS (ALT 636 FOR OP/ED): Performed by: FAMILY MEDICINE

## 2024-07-19 PROCEDURE — 93306 TTE W/DOPPLER COMPLETE: CPT

## 2024-07-22 PROCEDURE — RXMED WILLOW AMBULATORY MEDICATION CHARGE

## 2024-07-22 NOTE — PROGRESS NOTES
Duane returns after recent echo 7/19/24 status post 1/4/2021 cabg x 4 avr perceval large asc aorta. Sarah Allison RN    This is 68 years old male patient that I operated on 4 years ago with an aortic valve replacement he is doing fairly well, he had a stroke at the beginning of the last year and he has been followed by.  From my perspective he is doing actually very well, the echocardiogram is actually normal with normal working valve with normal gradients.  The ejection fraction is normal.  The patient is here today along with his daughter through the phone with his wife.  I talked to all of them together I have explained that he is doing very well and that we have to continue to follow him with a new echocardiogram in 1 more year.  I also talk about endocarditis prevention.

## 2024-07-23 ENCOUNTER — APPOINTMENT (OUTPATIENT)
Dept: CARDIAC SURGERY | Facility: HOSPITAL | Age: 69
End: 2024-07-23
Payer: MEDICARE

## 2024-07-24 ENCOUNTER — PHARMACY VISIT (OUTPATIENT)
Dept: PHARMACY | Facility: CLINIC | Age: 69
End: 2024-07-24
Payer: COMMERCIAL

## 2024-07-24 ENCOUNTER — OFFICE VISIT (OUTPATIENT)
Dept: CARDIAC SURGERY | Facility: HOSPITAL | Age: 69
End: 2024-07-24
Payer: MEDICARE

## 2024-07-24 VITALS
DIASTOLIC BLOOD PRESSURE: 76 MMHG | SYSTOLIC BLOOD PRESSURE: 135 MMHG | BODY MASS INDEX: 26.1 KG/M2 | HEART RATE: 59 BPM | OXYGEN SATURATION: 94 % | WEIGHT: 176.2 LBS | HEIGHT: 69 IN

## 2024-07-24 DIAGNOSIS — Z95.2 S/P AVR (AORTIC VALVE REPLACEMENT): Chronic | ICD-10-CM

## 2024-07-24 DIAGNOSIS — Z95.1 S/P CABG X 4: ICD-10-CM

## 2024-07-24 PROCEDURE — 3078F DIAST BP <80 MM HG: CPT | Performed by: THORACIC SURGERY (CARDIOTHORACIC VASCULAR SURGERY)

## 2024-07-24 PROCEDURE — 3062F POS MACROALBUMINURIA REV: CPT | Performed by: THORACIC SURGERY (CARDIOTHORACIC VASCULAR SURGERY)

## 2024-07-24 PROCEDURE — RXMED WILLOW AMBULATORY MEDICATION CHARGE

## 2024-07-24 PROCEDURE — 3008F BODY MASS INDEX DOCD: CPT | Performed by: THORACIC SURGERY (CARDIOTHORACIC VASCULAR SURGERY)

## 2024-07-24 PROCEDURE — 99215 OFFICE O/P EST HI 40 MIN: CPT | Performed by: THORACIC SURGERY (CARDIOTHORACIC VASCULAR SURGERY)

## 2024-07-24 PROCEDURE — 3075F SYST BP GE 130 - 139MM HG: CPT | Performed by: THORACIC SURGERY (CARDIOTHORACIC VASCULAR SURGERY)

## 2024-07-24 PROCEDURE — 1159F MED LIST DOCD IN RCRD: CPT | Performed by: THORACIC SURGERY (CARDIOTHORACIC VASCULAR SURGERY)

## 2024-07-24 PROCEDURE — 4010F ACE/ARB THERAPY RXD/TAKEN: CPT | Performed by: THORACIC SURGERY (CARDIOTHORACIC VASCULAR SURGERY)

## 2024-07-24 PROCEDURE — 1036F TOBACCO NON-USER: CPT | Performed by: THORACIC SURGERY (CARDIOTHORACIC VASCULAR SURGERY)

## 2024-07-24 PROCEDURE — 3051F HG A1C>EQUAL 7.0%<8.0%: CPT | Performed by: THORACIC SURGERY (CARDIOTHORACIC VASCULAR SURGERY)

## 2024-07-24 PROCEDURE — 1126F AMNT PAIN NOTED NONE PRSNT: CPT | Performed by: THORACIC SURGERY (CARDIOTHORACIC VASCULAR SURGERY)

## 2024-07-24 PROCEDURE — 3048F LDL-C <100 MG/DL: CPT | Performed by: THORACIC SURGERY (CARDIOTHORACIC VASCULAR SURGERY)

## 2024-07-24 ASSESSMENT — PAIN SCALES - GENERAL: PAINLEVEL: 0-NO PAIN

## 2024-07-25 DIAGNOSIS — Z95.2 STATUS POST AORTIC VALVE REPLACEMENT: ICD-10-CM

## 2024-08-21 PROCEDURE — RXMED WILLOW AMBULATORY MEDICATION CHARGE

## 2024-08-22 ENCOUNTER — PHARMACY VISIT (OUTPATIENT)
Dept: PHARMACY | Facility: CLINIC | Age: 69
End: 2024-08-22
Payer: COMMERCIAL

## 2024-09-12 ENCOUNTER — TELEMEDICINE (OUTPATIENT)
Dept: NEUROLOGY | Facility: CLINIC | Age: 69
End: 2024-09-12
Payer: MEDICARE

## 2024-09-12 DIAGNOSIS — F91.9 BEHAVIORAL DISORDER AS SEQUELA OF CEREBRAL INFARCTION: Primary | ICD-10-CM

## 2024-09-12 DIAGNOSIS — I69.354 HEMIPLEGIA AND HEMIPARESIS FOLLOWING CEREBRAL INFARCTION AFFECTING LEFT NON-DOMINANT SIDE (MULTI): ICD-10-CM

## 2024-09-12 DIAGNOSIS — E11.9 TYPE 2 DIABETES MELLITUS WITHOUT COMPLICATION, UNSPECIFIED WHETHER LONG TERM INSULIN USE (MULTI): ICD-10-CM

## 2024-09-12 DIAGNOSIS — I10 ESSENTIAL (PRIMARY) HYPERTENSION: ICD-10-CM

## 2024-09-12 DIAGNOSIS — Z86.73 HISTORY OF ISCHEMIC STROKE: ICD-10-CM

## 2024-09-12 DIAGNOSIS — I69.398 BEHAVIORAL DISORDER AS SEQUELA OF CEREBRAL INFARCTION: Primary | ICD-10-CM

## 2024-09-12 DIAGNOSIS — E78.2 MIXED HYPERLIPIDEMIA: ICD-10-CM

## 2024-09-12 PROCEDURE — 3051F HG A1C>EQUAL 7.0%<8.0%: CPT | Performed by: PSYCHIATRY & NEUROLOGY

## 2024-09-12 PROCEDURE — 3062F POS MACROALBUMINURIA REV: CPT | Performed by: PSYCHIATRY & NEUROLOGY

## 2024-09-12 PROCEDURE — G2211 COMPLEX E/M VISIT ADD ON: HCPCS | Performed by: PSYCHIATRY & NEUROLOGY

## 2024-09-12 PROCEDURE — 99214 OFFICE O/P EST MOD 30 MIN: CPT | Mod: GT,95 | Performed by: PSYCHIATRY & NEUROLOGY

## 2024-09-12 PROCEDURE — 1036F TOBACCO NON-USER: CPT | Performed by: PSYCHIATRY & NEUROLOGY

## 2024-09-12 PROCEDURE — 99214 OFFICE O/P EST MOD 30 MIN: CPT | Performed by: PSYCHIATRY & NEUROLOGY

## 2024-09-12 PROCEDURE — 3048F LDL-C <100 MG/DL: CPT | Performed by: PSYCHIATRY & NEUROLOGY

## 2024-09-12 PROCEDURE — 1160F RVW MEDS BY RX/DR IN RCRD: CPT | Performed by: PSYCHIATRY & NEUROLOGY

## 2024-09-12 PROCEDURE — 4010F ACE/ARB THERAPY RXD/TAKEN: CPT | Performed by: PSYCHIATRY & NEUROLOGY

## 2024-09-12 PROCEDURE — 1159F MED LIST DOCD IN RCRD: CPT | Performed by: PSYCHIATRY & NEUROLOGY

## 2024-09-12 ASSESSMENT — ACTIVITIES OF DAILY LIVING (ADL)
DRESSING: INDEPENDENT (INCLUDING BUTTONS, ZIPS, LACES,ETC.)
BOWELS: INDEPENDENT (INCLUDING BUTTONS, ZIPS, LACES, ETC.)
BATHING: INDEPENDENT (OR IN SHOWER)
BED_TO_CHAIR_AND_BACK: INDEPENDENT
GROOMING: INDEPENDENT FACE/HAIR/TEETH.SHAVING (IMPLEMENTS PROVIDED)
TOTAL_SCORE: 100
FEEDING: INDEPENDENT
TOILET_USE: INDEPENDENT (ON AND OFF, DRESSING, WIPING)
MOBILITY_LEVEL_SURFACES: INDEPENDENT (BUT MAY USE ANY AID FOR EXAMPLE, STICK) > 50 YARDS
STAIRS: INDEPENDENT
BLADDER: CONTINENT
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
## 2024-09-15 PROCEDURE — RXMED WILLOW AMBULATORY MEDICATION CHARGE

## 2024-09-16 PROCEDURE — RXMED WILLOW AMBULATORY MEDICATION CHARGE

## 2024-09-18 ENCOUNTER — PHARMACY VISIT (OUTPATIENT)
Dept: PHARMACY | Facility: CLINIC | Age: 69
End: 2024-09-18
Payer: COMMERCIAL

## 2024-10-02 DIAGNOSIS — Z00.00 ENCOUNTER FOR GENERAL ADULT MEDICAL EXAMINATION WITHOUT ABNORMAL FINDINGS: ICD-10-CM

## 2024-10-02 RX ORDER — FUROSEMIDE 40 MG/1
40 TABLET ORAL DAILY
Qty: 90 TABLET | Refills: 1 | Status: SHIPPED | OUTPATIENT
Start: 2024-10-02

## 2024-10-15 DIAGNOSIS — Z79.4 TYPE 2 DIABETES MELLITUS WITHOUT COMPLICATION, WITH LONG-TERM CURRENT USE OF INSULIN (MULTI): Chronic | ICD-10-CM

## 2024-10-15 DIAGNOSIS — I50.9 HEART FAILURE, UNSPECIFIED HF CHRONICITY, UNSPECIFIED HEART FAILURE TYPE: ICD-10-CM

## 2024-10-15 DIAGNOSIS — E11.9 TYPE 2 DIABETES MELLITUS WITHOUT COMPLICATION, WITH LONG-TERM CURRENT USE OF INSULIN (MULTI): Chronic | ICD-10-CM

## 2024-10-15 PROCEDURE — RXMED WILLOW AMBULATORY MEDICATION CHARGE

## 2024-10-15 RX ORDER — DAPAGLIFLOZIN 5 MG/1
5 TABLET, FILM COATED ORAL DAILY
Qty: 30 TABLET | Refills: 3 | Status: SHIPPED | OUTPATIENT
Start: 2024-10-15 | End: 2025-10-15

## 2024-10-18 ENCOUNTER — PHARMACY VISIT (OUTPATIENT)
Dept: PHARMACY | Facility: CLINIC | Age: 69
End: 2024-10-18
Payer: COMMERCIAL

## 2024-11-13 PROCEDURE — RXMED WILLOW AMBULATORY MEDICATION CHARGE

## 2024-11-15 ENCOUNTER — PHARMACY VISIT (OUTPATIENT)
Dept: PHARMACY | Facility: CLINIC | Age: 69
End: 2024-11-15
Payer: COMMERCIAL

## 2024-11-25 NOTE — PROGRESS NOTES
Referred by No ref. provider found    HPI  Uncontrolled for follow-up.  Feeling well.  No chest pain or pressure no shortness of breath.  Activity curtailed because of knee pain.  He is scheduled to have orthopedic surgery in February.  He did not start Entresto.  He remains on losartan.    Past Medical History:  Problem List Items Addressed This Visit    None     Past Medical History:   Diagnosis Date    Aortic stenosis 05/17/2023    Bicuspid AV moderate, s/p Large [place] AVR 12/2020    CAD (coronary artery disease) 05/17/2023    Elevated CAC @ 1353, s/p CABG 12/2020 with OIMA-LAD, SVG-PDA, OM1 Y graft to OM2    Cardiomyopathy, unspecified (Multi) 09/11/2022    EF 35-40%, now 40-45%    Cerebrovascular accident (CVA) (Multi) 05/17/2023    Post OHS 12/2020 with left-sided visual deficit, recurrent CVA 8/2022. ILANA negative. Now on Eliquis and Plavix.    Essential (primary) hypertension 09/11/2022    Mitral valve regurgitation 08/23/2023    Mild to moderate    Mixed hyperlipidemia 08/23/2023    Dr. Gomez following    Other specified health status     No known health problems    Right hemisphere, cerebral infarction (Multi) 09/11/2022    S/P AVR (aortic valve replacement) 08/23/2023    Thoracic aortic aneurysm (CMS-HCC) 08/23/2023    4.6 cm s/p #32 Gelweave aortic graft    Type 2 diabetes mellitus without complications (Multi) 09/11/2022    Unspecified sequelae of cerebral infarction 07/23/2021    H/O: stroke with residual effects      Past Surgical History:  He has a past surgical history that includes Other surgical history (02/01/2021); CT angio head w and wo IV contrast (1/5/2021); CT angio neck (1/5/2021); CT angio head w and wo IV contrast (8/18/2022); and CT angio neck (8/18/2022).      Social History:  He reports that he has never smoked. He has never been exposed to tobacco smoke. He has never used smokeless tobacco. He reports that he does not currently use alcohol. He reports that he does not use  "drugs.    Family History:  Family History   Problem Relation Name Age of Onset    Coronary artery disease Father      No Known Problems Sibling       Allergies:  Patient has no known allergies.    Outpatient Medications:  Current Outpatient Medications   Medication Instructions    atorvastatin (LIPITOR) 80 mg, oral, Daily    clopidogrel (Plavix) 75 mg tablet TAKE 1 TABLET BY MOUTH EVERY DAY    cyanocobalamin, vitamin B-12, (Vitamin B-12) 1,000 mcg tablet extended release 1 tablet, oral, Every morning    Farxiga 5 mg, oral, Daily    fenofibrate (TRIGLIDE) 160 mg, oral, Every morning    furosemide (LASIX) 40 mg, oral, Daily    losartan (COZAAR) 25 mg, oral, Daily, as directed    metFORMIN XR (GLUCOPHAGE-XR) 500 mg, oral, Daily, take with evening meal    metoprolol succinate XL (TOPROL-XL) 50 mg, oral, Daily    multivitamin with minerals (multivit-min-iron fum-folic ac) tablet 1 tablet, oral, Every morning    pen needle, diabetic (BD Ultra-Fine Kerry Pen Needle) 32 gauge x 5/32\" needle AS DIRECTED    QUEtiapine (SEROQUEL) 25 mg, oral, Nightly    sacubitriL-valsartan (Entresto) 24-26 mg tablet 1 tablet, oral, 2 times daily    Semglee(insulin glarg-yfgn)Pen 14 Units, subcutaneous, Every 24 hours    tamsulosin (Flomax) 0.4 mg 24 hr capsule TAKE 1 CAPSULE BY MOUTH EVERYDAY AT BEDTIME    Xarelto 20 mg, oral, Every evening     Last Recorded Vitals:  There were no vitals filed for this visit.    Physical Exam  Patient is alert and oriented x3.  HEENT is unremarkable mucous members are moist  Neck no JVP no bruits upstrokes are full no thyromegaly  Lungs are clear bilaterally.  No wheezing crackles or rales  Heart regular rhythm normal S1-S2 there is no S3 1-2/6 ANDRES  Abdomen is soft bs are positive nontender nondistended no organomegaly no pulsatile masses  Extremities have no edema.  Distal pulses present palpable.  Neuro is grossly nonfocal  Skin has no rashes     Last Labs:  CBC -  Lab Results   Component Value Date    WBC " 6.9 05/07/2024    HGB 13.1 (L) 05/07/2024    HCT 41.0 05/07/2024    MCV 96 05/07/2024     05/07/2024     CMP -  Lab Results   Component Value Date    CALCIUM 9.9 05/07/2024    PHOS 4.0 02/13/2024    PROT 7.2 05/07/2024    ALBUMIN 4.6 05/07/2024    AST 16 05/07/2024    ALT 15 05/07/2024    ALKPHOS 41 05/07/2024    BILITOT 0.5 05/07/2024     LIPID PANEL -   Lab Results   Component Value Date    CHOL 137 05/07/2024    HDL 34.0 05/07/2024    CHHDL 4.0 05/07/2024    VLDL 43 (H) 05/07/2024    TRIG 217 (H) 05/07/2024    NHDL 103 05/07/2024     RENAL FUNCTION PANEL -   Lab Results   Component Value Date    K 4.0 05/07/2024    PHOS 4.0 02/13/2024     Lab Results   Component Value Date    BNP 39 08/18/2022    HGBA1C 7.2 (H) 05/07/2024    HGBA1C 8.7 (A) 08/23/2023    HGBA1C 7.6 12/03/2021     Procedure    TTE 7/19/2024 EF 45 to 50%, DD F, bioprosthetic aortic valve with prosthetic aortic graft    Event recorder 2/13/2024 average heart rate 62 bpm no atrial fibrillation    Echo 2/12/2024 EF 45%, multiple wall motion abnormalities involving the septal inferior lateral segments apex is dyskinetic, well-seated bioprosthetic aortic valve    Echo 8/2/23 EF 40%, HK apex, AVR 20/10mmHg    ILANA [09/08/2022]: EF 40-45%. Multiple WMAs. Distal anterior / anteroapical / inferoapical walls are hypokinetic (unchanged from prior study). No LV thrombus vis'd. Mod cLVH. Bioprosthetic AV. No ev/of PFO. No LA mass / thrombus. Plaque vis'd in transverse & descending aorta. No valvular vegetations.      EVENT MONITOR [08/19/2022 - 09/01/2022]: SR w/avg HR 71. No ep/of A-fib / PSVT / high-grade AV block. One ep/of nonsust VT.      ECHO [08/19/2022]: LVSF was not assessed.      ECHO [07/27/2022]: EF 40-45%. Apical septal segment, apical anterior segment and apex are abnormal. Abnormal septal motion c/w postop status. SD = impaired relaxation pattern. Mod cLVH. Bioprosthetic AV. Global hypok.      EVENT MONITOR [08/25/2021 â€“ 09/23/2021]: SR,  avg HR 68. No ep/of A-fib / PSVT / high-grade AV block. One ep/of nonsust VT.     CABG [01/04/2021, Dr. Stu Álvarez]: x4 = LIMA to LAD, SVG to PDA, SVG to first circumflex and Y to second circumflex. Aortic valve replacement with Perceval Large. Ascending aorta replacement with Gelweave graft 32 mm.     CATH [12/14/2020, Dr. Anuj Ham]: LAD occluded, 85% ramus intermedius, occluded second marginal, ostial 85% stenosis of the RCA, 75 to 80% proximal PDA and posterior lateral branch stenosis, normal right heart filling pressures     PHARM NST [11/18/2020]: Abnormal = ev/for prior distal septal and apical wall MI w/o residual jose-infarct ischemia. WMAs w/severely reduced calculated EF of 30%.      CT / SCORING [11/16/2020] = 1353 (LM 53.5, .2, LCx 243.7, .6). Aneurysmal dilatation of ascending thoracic aorta (4.6 cm).        Assessment/Plan   1. CAD. Bypass December 2020 LIMA to the LAD SVG to the PDA OM1 with a Y graft to the OM 2. At the same time he had an AVR and aortic root replacement. Doing well. Post-op CVA. Doing great. Continue Plavix. No symptoms of angina.  Physically remains active although is not exercising.     2. Aortic valve replacement.  This was done due to a bicuspid aortic valve.  He had a large Perceval bioprosthetic aortic valve replacement.  TTE 7/19/2024 EF 45 to 50%, DD F, bioprosthetic aortic valve with prosthetic aortic graft    3. Thoracic aortic aneurysm measuring 4.6 cm. At the time of surgery was noted to be 5.2 cm. Status post replacement with a #32 Gelweave graft. There is mild atherosclerotic disease on the ILANA.     4. Cardiomyopathy. Preoperative ejection fraction 35%. Postoperative ejection fraction 40 to 45%. On his repeat echo, the ejection fraction is once again 40 to 45%. There is severe hypokinesis to akinesis of the anterior wall consistent with his known totally occluded LAD. These findings were confirmed on the ILANA September 2022. TTE 7/19/2024 EF 45  to 50%, DD F, bioprosthetic aortic valve with prosthetic aortic graft. I did start Entresto, but he never started it. He con't wit losartan,    5. CVA-Post-op from OHS. He subsequently had a repeat CVA September 2022. The etiology was not clear.  Stroke neurology had evaluated patient.  No clear etiology found.  He does have an anteroapical wall motion abnormality in the distribution of the totally occluded LAD.  For this reason he has been managed with clopidogrel and with Xarelto.    6. Hyperlipidemia.  Followed by PCP.  5/7/2024 LDL 60 HDL 34 triglycerides 217    7. Hypertension.  Blood pressures are excellent today    8.  Preoperative clearance.  His surgical risk is moderately elevated but acceptable.  He can hold his clopidogrel for 5 days before surgery.  He will hold his Xarelto for 3 days prior to surgery.  Both of these should be restarted postoperatively as soon as his feasible.    EKG today.  Return to see me in 6 months.  Sooner if any issues arise    Anuj Ham MD     Instructions and follow up

## 2024-11-26 ENCOUNTER — APPOINTMENT (OUTPATIENT)
Dept: CARDIOLOGY | Facility: CLINIC | Age: 69
End: 2024-11-26
Payer: MEDICARE

## 2024-11-26 VITALS
OXYGEN SATURATION: 95 % | DIASTOLIC BLOOD PRESSURE: 68 MMHG | WEIGHT: 177 LBS | BODY MASS INDEX: 26.14 KG/M2 | HEART RATE: 58 BPM | SYSTOLIC BLOOD PRESSURE: 128 MMHG

## 2024-11-26 DIAGNOSIS — I10 ESSENTIAL (PRIMARY) HYPERTENSION: Chronic | ICD-10-CM

## 2024-11-26 DIAGNOSIS — I42.9 CARDIOMYOPATHY, UNSPECIFIED TYPE (MULTI): ICD-10-CM

## 2024-11-26 DIAGNOSIS — I10 PRIMARY HYPERTENSION: ICD-10-CM

## 2024-11-26 DIAGNOSIS — I35.0 NONRHEUMATIC AORTIC VALVE STENOSIS: Primary | Chronic | ICD-10-CM

## 2024-11-26 DIAGNOSIS — I34.0 NONRHEUMATIC MITRAL VALVE REGURGITATION: Chronic | ICD-10-CM

## 2024-11-26 DIAGNOSIS — I71.21 ANEURYSM OF ASCENDING AORTA WITHOUT RUPTURE (CMS-HCC): Chronic | ICD-10-CM

## 2024-11-26 DIAGNOSIS — E78.2 MIXED HYPERLIPIDEMIA: ICD-10-CM

## 2024-11-26 DIAGNOSIS — Z95.2 S/P AVR (AORTIC VALVE REPLACEMENT): Chronic | ICD-10-CM

## 2024-11-26 DIAGNOSIS — I25.5 ISCHEMIC CARDIOMYOPATHY: ICD-10-CM

## 2024-11-26 DIAGNOSIS — I25.10 CORONARY ARTERY DISEASE INVOLVING NATIVE HEART WITHOUT ANGINA PECTORIS, UNSPECIFIED VESSEL OR LESION TYPE: Chronic | ICD-10-CM

## 2024-11-26 PROBLEM — E11.9 TYPE 2 DIABETES MELLITUS WITHOUT COMPLICATIONS (MULTI): Chronic | Status: RESOLVED | Noted: 2022-09-11 | Resolved: 2024-11-26

## 2024-11-26 LAB
ATRIAL RATE: 56 BPM
P AXIS: 68 DEGREES
P OFFSET: 171 MS
P ONSET: 120 MS
PR INTERVAL: 158 MS
Q ONSET: 199 MS
QRS COUNT: 9 BEATS
QRS DURATION: 102 MS
QT INTERVAL: 442 MS
QTC CALCULATION(BAZETT): 426 MS
QTC FREDERICIA: 432 MS
R AXIS: -41 DEGREES
T AXIS: 101 DEGREES
T OFFSET: 420 MS
VENTRICULAR RATE: 56 BPM

## 2024-11-26 PROCEDURE — 3062F POS MACROALBUMINURIA REV: CPT | Performed by: INTERNAL MEDICINE

## 2024-11-26 PROCEDURE — G2211 COMPLEX E/M VISIT ADD ON: HCPCS | Performed by: INTERNAL MEDICINE

## 2024-11-26 PROCEDURE — 1160F RVW MEDS BY RX/DR IN RCRD: CPT | Performed by: INTERNAL MEDICINE

## 2024-11-26 PROCEDURE — 93005 ELECTROCARDIOGRAM TRACING: CPT | Performed by: INTERNAL MEDICINE

## 2024-11-26 PROCEDURE — 1036F TOBACCO NON-USER: CPT | Performed by: INTERNAL MEDICINE

## 2024-11-26 PROCEDURE — 3078F DIAST BP <80 MM HG: CPT | Performed by: INTERNAL MEDICINE

## 2024-11-26 PROCEDURE — 3051F HG A1C>EQUAL 7.0%<8.0%: CPT | Performed by: INTERNAL MEDICINE

## 2024-11-26 PROCEDURE — 4010F ACE/ARB THERAPY RXD/TAKEN: CPT | Performed by: INTERNAL MEDICINE

## 2024-11-26 PROCEDURE — 3048F LDL-C <100 MG/DL: CPT | Performed by: INTERNAL MEDICINE

## 2024-11-26 PROCEDURE — 3074F SYST BP LT 130 MM HG: CPT | Performed by: INTERNAL MEDICINE

## 2024-11-26 PROCEDURE — 99214 OFFICE O/P EST MOD 30 MIN: CPT | Performed by: INTERNAL MEDICINE

## 2024-11-26 PROCEDURE — 1159F MED LIST DOCD IN RCRD: CPT | Performed by: INTERNAL MEDICINE

## 2024-11-26 NOTE — PATIENT INSTRUCTIONS
1. CAD. Bypass December 2020 LIMA to the LAD SVG to the PDA OM1 with a Y graft to the OM 2. At the same time he had an AVR and aortic root replacement. Doing well. Post-op CVA. Doing great. Continue Plavix. No symptoms of angina.  Physically remains active although is not exercising.     2. Aortic valve replacement.  This was done due to a bicuspid aortic valve.  He had a large Perceval bioprosthetic aortic valve replacement.  TTE 7/19/2024 EF 45 to 50%, DD F, bioprosthetic aortic valve with prosthetic aortic graft    3. Thoracic aortic aneurysm measuring 4.6 cm. At the time of surgery was noted to be 5.2 cm. Status post replacement with a #32 Gelweave graft. There is mild atherosclerotic disease on the ILANA.     4. Cardiomyopathy. Preoperative ejection fraction 35%. Postoperative ejection fraction 40 to 45%. On his repeat echo, the ejection fraction is once again 40 to 45%. There is severe hypokinesis to akinesis of the anterior wall consistent with his known totally occluded LAD. These findings were confirmed on the ILANA September 2022. TTE 7/19/2024 EF 45 to 50%, DD F, bioprosthetic aortic valve with prosthetic aortic graft. I did start Entresto, but he never started it. He con't wit losartan,    5. CVA-Post-op from [facility]. He subsequently had a repeat CVA September 2022. The etiology was not clear.  Stroke neurology had evaluated patient.  No clear etiology found.  He does have an anteroapical wall motion abnormality in the distribution of the totally occluded LAD.  For this reason he has been managed with clopidogrel and with Xarelto.    6. Hyperlipidemia.  Followed by PCP.  5/7/2024 LDL 60 HDL 34 triglycerides 217    7. Hypertension.  Blood pressures are excellent today    8.  Preoperative clearance.  His surgical risk is moderately elevated but acceptable.  He can hold his clopidogrel for 5 days before surgery.  He will hold his Xarelto for 3 days prior to surgery.  Both of these should be restarted  postoperatively as soon as his feasible.    EKG today.  Return to see me in 6 months.  Sooner if any issues arise

## 2024-12-10 PROCEDURE — RXMED WILLOW AMBULATORY MEDICATION CHARGE

## 2024-12-12 ENCOUNTER — PHARMACY VISIT (OUTPATIENT)
Dept: PHARMACY | Facility: CLINIC | Age: 69
End: 2024-12-12
Payer: COMMERCIAL

## 2024-12-16 PROCEDURE — RXMED WILLOW AMBULATORY MEDICATION CHARGE

## 2024-12-19 ENCOUNTER — PHARMACY VISIT (OUTPATIENT)
Dept: PHARMACY | Facility: CLINIC | Age: 69
End: 2024-12-19
Payer: COMMERCIAL

## 2025-01-03 DIAGNOSIS — I42.9 CARDIOMYOPATHY, UNSPECIFIED: ICD-10-CM

## 2025-01-03 RX ORDER — METOPROLOL SUCCINATE 50 MG/1
50 TABLET, EXTENDED RELEASE ORAL DAILY
Qty: 90 TABLET | Refills: 3 | Status: SHIPPED | OUTPATIENT
Start: 2025-01-03

## 2025-01-09 PROCEDURE — RXMED WILLOW AMBULATORY MEDICATION CHARGE

## 2025-01-10 ENCOUNTER — PHARMACY VISIT (OUTPATIENT)
Dept: PHARMACY | Facility: CLINIC | Age: 70
End: 2025-01-10
Payer: COMMERCIAL

## 2025-02-04 ENCOUNTER — APPOINTMENT (OUTPATIENT)
Dept: PHARMACY | Facility: HOSPITAL | Age: 70
End: 2025-02-04
Payer: MEDICARE

## 2025-02-04 DIAGNOSIS — Z79.4 TYPE 2 DIABETES MELLITUS WITHOUT COMPLICATION, WITH LONG-TERM CURRENT USE OF INSULIN (MULTI): ICD-10-CM

## 2025-02-04 DIAGNOSIS — I63.9 CARDIOEMBOLIC STROKE (MULTI): ICD-10-CM

## 2025-02-04 DIAGNOSIS — E11.9 TYPE 2 DIABETES MELLITUS WITHOUT COMPLICATION, WITH LONG-TERM CURRENT USE OF INSULIN (MULTI): ICD-10-CM

## 2025-02-06 DIAGNOSIS — E11.9 TYPE 2 DIABETES MELLITUS WITHOUT COMPLICATION, WITH LONG-TERM CURRENT USE OF INSULIN (MULTI): Chronic | ICD-10-CM

## 2025-02-06 DIAGNOSIS — I50.9 HEART FAILURE, UNSPECIFIED HF CHRONICITY, UNSPECIFIED HEART FAILURE TYPE: ICD-10-CM

## 2025-02-06 DIAGNOSIS — Z79.4 TYPE 2 DIABETES MELLITUS WITHOUT COMPLICATION, WITH LONG-TERM CURRENT USE OF INSULIN (MULTI): Chronic | ICD-10-CM

## 2025-02-06 RX ORDER — DAPAGLIFLOZIN 5 MG/1
5 TABLET, FILM COATED ORAL DAILY
Qty: 30 TABLET | Refills: 3 | Status: SHIPPED | OUTPATIENT
Start: 2025-02-06 | End: 2026-02-06

## 2025-02-10 PROCEDURE — RXMED WILLOW AMBULATORY MEDICATION CHARGE

## 2025-02-10 RX ORDER — DAPAGLIFLOZIN 5 MG/1
5 TABLET, FILM COATED ORAL DAILY
Qty: 90 TABLET | Refills: 3 | Status: SHIPPED | OUTPATIENT
Start: 2025-02-10 | End: 2026-03-17

## 2025-02-10 RX ORDER — RIVAROXABAN 20 MG/1
20 TABLET, FILM COATED ORAL EVERY EVENING
Qty: 90 TABLET | Refills: 3 | Status: SHIPPED | OUTPATIENT
Start: 2025-02-10

## 2025-02-10 RX ORDER — INSULIN GLARGINE-YFGN 100 [IU]/ML
14 INJECTION, SOLUTION SUBCUTANEOUS EVERY 24 HOURS
Qty: 15 ML | Refills: 2 | Status: SHIPPED | OUTPATIENT
Start: 2025-02-10 | End: 2026-02-10

## 2025-02-10 NOTE — PROGRESS NOTES
"      Patient ID: Isaiah Jin \"Duane\" is a 69 y.o. male who presents for Diabetes and Congestive Heart Failure. Patient presents for  Patient Assistance Program application renewal. Spoke with wife, Karol.     Referring Provider: Kwabena Gomez DO  PCP: Kwabena Gomez DO Last visit with PCP: 5/7/24 Next visit with PCP: Not currently scheduled       Subjective   Treatment Adherence:   Patient did take medications today.   Number of missed doses in last 7 days: 0.      Preferred pharmacy: [facility] for Lantus, Xarelto, and Farxiga  Can patient afford prescribed medications: Yes, Patient is enrolled in  PAP    DISCUSSION  Patient continues to tolerate Xarelto well  Denies signs of bleeding/bruising/dark tarry stool  Denies any missed doses  Patient continues to tolerate Farxiga well  Denies signs of UTI  Patient continues to tolerate Lantus insulin well  Denies signs of hypoglycemia  Reviewed signs of hypoglycemia and how to correct with Rule of 15  Denies signs of hyperglycemia  Preventative care  Eyes: checked last Spring, patient due for next check up  Feet: does not see podiatrist but PCP checks for cuts/scrapes   Patient has no further questions or concerns     Objective     There were no vitals taken for this visit.   BP Readings from Last 4 Encounters:   11/26/24 128/68   07/24/24 135/76   05/24/24 122/76   05/07/24 130/74      There were no vitals filed for this visit.     Labs  Lab Results   Component Value Date    BILITOT 0.5 05/07/2024    CALCIUM 9.9 05/07/2024    CO2 25 05/07/2024     05/07/2024    CREATININE 1.35 (H) 05/07/2024    GLUCOSE 110 (H) 05/07/2024    ALKPHOS 41 05/07/2024    K 4.0 05/07/2024    PROT 7.2 05/07/2024     05/07/2024    AST 16 05/07/2024    ALT 15 05/07/2024    BUN 26 (H) 05/07/2024    ANIONGAP 15 05/07/2024    MG 2.36 02/13/2024    PHOS 4.0 02/13/2024    ALBUMIN 4.6 05/07/2024    GFRMALE 63 05/26/2023     Lab Results   Component Value Date    TRIG " "217 (H) 05/07/2024    CHOL 137 05/07/2024    LDLCALC 60 05/07/2024    HDL 34.0 05/07/2024     Lab Results   Component Value Date    HGBA1C 7.2 (H) 05/07/2024       Current Outpatient Medications   Medication Instructions    atorvastatin (LIPITOR) 80 mg, oral, Daily    clopidogrel (Plavix) 75 mg tablet TAKE 1 TABLET BY MOUTH EVERY DAY    cyanocobalamin, vitamin B-12, (Vitamin B-12) 1,000 mcg tablet extended release 1 tablet, Every morning    dapagliflozin propanediol (FARXIGA) 5 mg, oral, Daily    fenofibrate (TRIGLIDE) 160 mg, oral, Every morning    furosemide (LASIX) 40 mg, oral, Daily    losartan (COZAAR) 25 mg, Daily    metFORMIN XR (GLUCOPHAGE-XR) 500 mg, oral, Daily, take with evening meal    metoprolol succinate XL (TOPROL-XL) 50 mg, oral, Daily    multivitamin with minerals (multivit-min-iron fum-folic ac) tablet 1 tablet, Every morning    pen needle, diabetic (BD Ultra-Fine Kerry Pen Needle) 32 gauge x 5/32\" needle AS DIRECTED    QUEtiapine (SEROQUEL) 25 mg, oral, Nightly    Semglee(insulin glarg-yfgn)Pen 14 Units, subcutaneous, Every 24 hours    tamsulosin (Flomax) 0.4 mg 24 hr capsule TAKE 1 CAPSULE BY MOUTH EVERYDAY AT BEDTIME    Xarelto 20 mg, oral, Every evening       Assessment/Plan   CONTINUE all medications as prescribed; no medication changes made at this time   PAP application submitted   Patient to call [facility] pharmacy for questions on delivery         Follow-up: 11 months for  PAP application renewal      Time spent with pt: Total length of time 15 (minutes) of the encounter and more than 50% was spent counseling the patient.    Rufina Mercado, PharmD    Continue all meds under the continuation of care with the referring provider and clinical pharmacy team.    "

## 2025-02-11 PROCEDURE — RXMED WILLOW AMBULATORY MEDICATION CHARGE

## 2025-02-13 ENCOUNTER — PHARMACY VISIT (OUTPATIENT)
Dept: PHARMACY | Facility: CLINIC | Age: 70
End: 2025-02-13
Payer: COMMERCIAL

## 2025-02-19 DIAGNOSIS — I10 ESSENTIAL (PRIMARY) HYPERTENSION: ICD-10-CM

## 2025-02-20 RX ORDER — LOSARTAN POTASSIUM 25 MG/1
25 TABLET ORAL DAILY
Qty: 90 TABLET | Refills: 3 | Status: SHIPPED | OUTPATIENT
Start: 2025-02-20

## 2025-03-03 PROCEDURE — RXMED WILLOW AMBULATORY MEDICATION CHARGE

## 2025-03-07 ENCOUNTER — PHARMACY VISIT (OUTPATIENT)
Dept: PHARMACY | Facility: CLINIC | Age: 70
End: 2025-03-07
Payer: COMMERCIAL

## 2025-03-21 DIAGNOSIS — E11.9 TYPE 2 DIABETES MELLITUS WITHOUT COMPLICATION, WITHOUT LONG-TERM CURRENT USE OF INSULIN (MULTI): Primary | ICD-10-CM

## 2025-03-21 RX ORDER — BLOOD-GLUCOSE METER
EACH MISCELLANEOUS
Qty: 100 STRIP | Refills: 3 | Status: SHIPPED | OUTPATIENT
Start: 2025-03-21

## 2025-03-25 DIAGNOSIS — I69.398 BEHAVIORAL DISORDER AS SEQUELA OF CEREBRAL INFARCTION: ICD-10-CM

## 2025-03-25 DIAGNOSIS — F91.9 BEHAVIORAL DISORDER AS SEQUELA OF CEREBRAL INFARCTION: ICD-10-CM

## 2025-03-26 RX ORDER — QUETIAPINE FUMARATE 25 MG/1
25 TABLET, FILM COATED ORAL NIGHTLY
Qty: 90 TABLET | Refills: 3 | Status: SHIPPED | OUTPATIENT
Start: 2025-03-26 | End: 2026-03-26

## 2025-05-07 PROBLEM — N40.0 BENIGN PROSTATIC HYPERPLASIA: Status: RESOLVED | Noted: 2022-09-27 | Resolved: 2025-05-07

## 2025-05-07 PROBLEM — S89.90XA INJURY OF LOWER EXTREMITY: Status: RESOLVED | Noted: 2023-08-23 | Resolved: 2025-05-07

## 2025-05-07 PROBLEM — R29.898 LUE WEAKNESS: Status: RESOLVED | Noted: 2024-02-11 | Resolved: 2025-05-07

## 2025-05-07 PROBLEM — D17.20 LIPOMA OF EXTREMITY: Status: RESOLVED | Noted: 2024-01-30 | Resolved: 2025-05-07

## 2025-05-07 PROBLEM — Z96.651 STATUS POST RIGHT UNICOMPARTMENTAL KNEE REPLACEMENT: Status: ACTIVE | Noted: 2025-02-28

## 2025-05-07 PROBLEM — I71.40 ABDOMINAL AORTIC ANEURYSM WITHOUT RUPTURE: Status: ACTIVE | Noted: 2022-09-11

## 2025-05-07 PROBLEM — S81.801A WOUND OF RIGHT LEG: Status: RESOLVED | Noted: 2023-08-23 | Resolved: 2025-05-07

## 2025-05-07 PROCEDURE — RXMED WILLOW AMBULATORY MEDICATION CHARGE

## 2025-05-12 ENCOUNTER — PHARMACY VISIT (OUTPATIENT)
Dept: PHARMACY | Facility: CLINIC | Age: 70
End: 2025-05-12
Payer: COMMERCIAL

## 2025-05-28 PROBLEM — I10 PRIMARY HYPERTENSION: Status: RESOLVED | Noted: 2022-09-11 | Resolved: 2025-05-28

## 2025-05-28 PROBLEM — R13.10 DYSPHAGIA: Status: RESOLVED | Noted: 2024-01-30 | Resolved: 2025-05-28

## 2025-05-28 PROBLEM — M17.11 OSTEOARTHRITIS OF RIGHT KNEE: Status: RESOLVED | Noted: 2024-01-30 | Resolved: 2025-05-28

## 2025-05-28 PROBLEM — N18.6 END STAGE RENAL DISEASE (MULTI): Chronic | Status: ACTIVE | Noted: 2024-05-07

## 2025-05-28 PROBLEM — I63.9 ACUTE CVA (CEREBROVASCULAR ACCIDENT) (MULTI): Chronic | Status: ACTIVE | Noted: 2024-02-13

## 2025-05-28 PROBLEM — M25.569 KNEE PAIN: Status: RESOLVED | Noted: 2023-07-10 | Resolved: 2025-05-28

## 2025-05-28 PROBLEM — M25.561 RIGHT KNEE PAIN: Status: RESOLVED | Noted: 2023-08-23 | Resolved: 2025-05-28

## 2025-05-28 NOTE — PROGRESS NOTES
Referred by No ref. provider found    HPI  I am seeing Duane 6-month follow-up.  He underwent knee surgery in February.  He feels much better.  He is engaged in physical therapy.  He is exercising walking and doing things he is even dancing.  No chest pain no pressure no heaviness no shortness of breath.  Past Medical History:  Problem List Items Addressed This Visit    None     Past Medical History:   Diagnosis Date    Aortic stenosis 05/17/2023    Bicuspid AV moderate, s/p Large [place] AVR 12/2020    CAD (coronary artery disease) 05/17/2023    Elevated CAC @ 1353, s/p CABG 12/2020 with OIMA-LAD, SVG-PDA, OM1 Y graft to OM2    Cardiomyopathy, unspecified 09/11/2022    EF 35-40%, now 40-45%    Cerebrovascular accident (CVA) (Multi) 05/17/2023    Post OHS 12/2020 with left-sided visual deficit, recurrent CVA 8/2022. ILANA negative. Now on Eliquis and Plavix.    Essential (primary) hypertension 09/11/2022    Mitral valve regurgitation 08/23/2023    Mild to moderate    Mixed hyperlipidemia 08/23/2023    Dr. Gomez following    Other specified health status     No known health problems    Right hemisphere, cerebral infarction (Multi) 09/11/2022    S/P AVR (aortic valve replacement) 08/23/2023    Thoracic aortic aneurysm 08/23/2023    4.6 cm s/p #32 Gelweave aortic graft    Type 2 diabetes mellitus without complications (Multi) 09/11/2022    Unspecified sequelae of cerebral infarction 07/23/2021    H/O: stroke with residual effects      Past Surgical History:  He has a past surgical history that includes Other surgical history (02/01/2021); CT angio head w and wo IV contrast (1/5/2021); CT angio neck (1/5/2021); CT angio head w and wo IV contrast (8/18/2022); and CT angio neck (8/18/2022).      Social History:  He reports that he has never smoked. He has never been exposed to tobacco smoke. He has never used smokeless tobacco. He reports that he does not currently use alcohol. He reports that he does not use  "drugs.    Family History:  Family History   Problem Relation Name Age of Onset    Coronary artery disease Father      No Known Problems Sibling       Allergies:  Patient has no known allergies.    Outpatient Medications:  Current Outpatient Medications   Medication Instructions    atorvastatin (LIPITOR) 80 mg, oral, Daily    clopidogrel (Plavix) 75 mg tablet TAKE 1 TABLET BY MOUTH EVERY DAY    cyanocobalamin, vitamin B-12, (Vitamin B-12) 1,000 mcg tablet extended release 1 tablet, Every morning    Farxiga 5 mg, oral, Daily    fenofibrate (TRIGLIDE) 160 mg, oral, Every morning    furosemide (LASIX) 40 mg, oral, Daily    losartan (COZAAR) 25 mg, oral, Daily, as directed    metFORMIN XR (GLUCOPHAGE-XR) 500 mg, oral, Daily, take with evening meal    metoprolol succinate XL (TOPROL-XL) 50 mg, oral, Daily    multivitamin with minerals (multivit-min-iron fum-folic ac) tablet 1 tablet, Every morning    OneTouch Verio test strips strip Used to check blood sugar once daily    pen needle, diabetic (BD Ultra-Fine Kerry Pen Needle) 32 gauge x 5/32\" needle AS DIRECTED    QUEtiapine (SEROQUEL) 25 mg, oral, Nightly    Semglee(insulin glarg-yfgn)Pen 14 Units, subcutaneous, Every 24 hours    tamsulosin (Flomax) 0.4 mg 24 hr capsule TAKE 1 CAPSULE BY MOUTH EVERYDAY AT BEDTIME    Xarelto 20 mg, oral, Every evening     Last Recorded Vitals:  There were no vitals filed for this visit.    Physical Exam  Patient is alert and oriented x3.  HEENT is unremarkable mucous members are moist  Neck no JVP no bruits upstrokes are full no thyromegaly  Lungs are clear bilaterally.  No wheezing crackles or rales  Heart regular rhythm normal S1-S2 there is no S3 1-2/6 ANDRES  Abdomen is soft bs are positive nontender nondistended no organomegaly no pulsatile masses  Extremities have no edema.  Distal pulses present palpable.  Neuro is grossly nonfocal  Skin has no rashes     Last Labs:  CBC -  Lab Results   Component Value Date    WBC 6.9 05/07/2024    HGB " 13.1 (L) 05/07/2024    HCT 41.0 05/07/2024    MCV 96 05/07/2024     05/07/2024     CMP -  Lab Results   Component Value Date    CALCIUM 9.9 05/07/2024    PHOS 4.0 02/13/2024    PROT 7.2 05/07/2024    ALBUMIN 4.6 05/07/2024    AST 16 05/07/2024    ALT 15 05/07/2024    ALKPHOS 41 05/07/2024    BILITOT 0.5 05/07/2024     LIPID PANEL -   Lab Results   Component Value Date    CHOL 137 05/07/2024    HDL 34.0 05/07/2024    CHHDL 4.0 05/07/2024    VLDL 43 (H) 05/07/2024    TRIG 217 (H) 05/07/2024    NHDL 103 05/07/2024     RENAL FUNCTION PANEL -   Lab Results   Component Value Date    K 4.0 05/07/2024    PHOS 4.0 02/13/2024     Lab Results   Component Value Date    BNP 39 08/18/2022    HGBA1C 7.2 (H) 02/13/2025    HGBA1C 7.2 (H) 05/07/2024    HGBA1C 8.7 (A) 08/23/2023    HGBA1C 7.6 12/03/2021     Procedure    TTE 7/19/2024 EF 45 to 50%, DD F, bioprosthetic aortic valve with prosthetic aortic graft    Event recorder 2/13/2024 average heart rate 62 bpm no atrial fibrillation    Echo 2/12/2024 EF 45%, multiple wall motion abnormalities involving the septal inferior lateral segments apex is dyskinetic, well-seated bioprosthetic aortic valve    Echo 8/2/23 EF 40%, HK apex, AVR 20/10mmHg    ILANA [09/08/2022]: EF 40-45%. Multiple WMAs. Distal anterior / anteroapical / inferoapical walls are hypokinetic (unchanged from prior study). No LV thrombus vis'd. Mod cLVH. Bioprosthetic AV. No ev/of PFO. No LA mass / thrombus. Plaque vis'd in transverse & descending aorta. No valvular vegetations.      EVENT MONITOR [08/19/2022 - 09/01/2022]: SR w/avg HR 71. No ep/of A-fib / PSVT / high-grade AV block. One ep/of nonsust VT.      ECHO [08/19/2022]: LVSF was not assessed.      ECHO [07/27/2022]: EF 40-45%. Apical septal segment, apical anterior segment and apex are abnormal. Abnormal septal motion c/w postop status. SD = impaired relaxation pattern. Mod cLVH. Bioprosthetic AV. Global hypok.      EVENT MONITOR [08/25/2021 â€“  09/23/2021]: SR, avg HR 68. No ep/of A-fib / PSVT / high-grade AV block. One ep/of nonsust VT.     CABG [01/04/2021, Dr. Stu Álvarez]: x4 = LIMA to LAD, SVG to PDA, SVG to first circumflex and Y to second circumflex. Aortic valve replacement with Perceval Large. Ascending aorta replacement with Gelweave graft 32 mm.     CATH [12/14/2020, Dr. Anuj Ham]: LAD occluded, 85% ramus intermedius, occluded second marginal, ostial 85% stenosis of the RCA, 75 to 80% proximal PDA and posterior lateral branch stenosis, normal right heart filling pressures     PHARM NST [11/18/2020]: Abnormal = ev/for prior distal septal and apical wall MI w/o residual jose-infarct ischemia. WMAs w/severely reduced calculated EF of 30%.      CT / SCORING [11/16/2020] = 1353 (LM 53.5, .2, LCx 243.7, .6). Aneurysmal dilatation of ascending thoracic aorta (4.6 cm).        Assessment/Plan   1. CAD. Bypass December 2020 LIMA to the LAD SVG to the PDA OM1 with a Y graft to the OM 2. At the same time he had an AVR and aortic root replacement. Doing well. Post-op CVA. Doing great. Continue Plavix. No symptoms of angina.  Physically remains active although is not exercising.     2. Aortic valve replacement.  This was done due to a bicuspid aortic valve.  He had a large Perceval bioprosthetic aortic valve replacement.  TTE 7/19/2024 EF 45 to 50%, DD F, bioprosthetic aortic valve with prosthetic aortic graft    3. Thoracic aortic aneurysm measuring 4.6 cm. At the time of surgery was noted to be 5.2 cm. Status post replacement with a #32 Gelweave graft. There is mild atherosclerotic disease on the ILANA.     4. Cardiomyopathy. Preoperative ejection fraction 35%. Postoperative ejection fraction 40 to 45%. On his repeat echo, the ejection fraction is once again 40 to 45%. There is severe hypokinesis to akinesis of the anterior wall consistent with his known totally occluded LAD. These findings were confirmed on the ILANA September 2022.   Continue with losartan, metoprolol, and Farxiga.  He does take Lasix 40 mg daily.  TTE 7/19/2024 EF 45 to 50%, DD F, bioprosthetic aortic valve with prosthetic aortic graft.    5. CVA-Post-op from OHS. He subsequently had a repeat CVA September 2022. The etiology was not clear.  Stroke neurology had evaluated patient.  He does have an anteroapical wall motion abnormality in the distribution of the totally occluded LAD.  For this reason he has been managed with clopidogrel and with Xarelto.    6. Hyperlipidemia.  Followed by PCP.  5/7/2024 LDL 60 HDL 34 triglycerides 217.  This should be repeated soon    7. Hypertension.  Blood pressures are excellent today    Doing well.  RTC 6 months  Anuj Ham MD     Instructions and follow up

## 2025-05-29 ENCOUNTER — APPOINTMENT (OUTPATIENT)
Dept: CARDIOLOGY | Facility: CLINIC | Age: 70
End: 2025-05-29
Payer: MEDICARE

## 2025-05-29 VITALS
BODY MASS INDEX: 26.81 KG/M2 | SYSTOLIC BLOOD PRESSURE: 130 MMHG | DIASTOLIC BLOOD PRESSURE: 72 MMHG | HEART RATE: 59 BPM | WEIGHT: 181 LBS | OXYGEN SATURATION: 95 % | HEIGHT: 69 IN

## 2025-05-29 DIAGNOSIS — I35.0 NONRHEUMATIC AORTIC VALVE STENOSIS: Primary | Chronic | ICD-10-CM

## 2025-05-29 DIAGNOSIS — E78.2 MIXED HYPERLIPIDEMIA: Chronic | ICD-10-CM

## 2025-05-29 DIAGNOSIS — I25.5 ISCHEMIC CARDIOMYOPATHY: ICD-10-CM

## 2025-05-29 DIAGNOSIS — Z95.2 S/P AVR (AORTIC VALVE REPLACEMENT): Chronic | ICD-10-CM

## 2025-05-29 DIAGNOSIS — I10 ESSENTIAL (PRIMARY) HYPERTENSION: Chronic | ICD-10-CM

## 2025-05-29 DIAGNOSIS — I71.21 ANEURYSM OF ASCENDING AORTA WITHOUT RUPTURE: Chronic | ICD-10-CM

## 2025-05-29 DIAGNOSIS — I25.10 CORONARY ARTERY DISEASE INVOLVING NATIVE HEART WITHOUT ANGINA PECTORIS, UNSPECIFIED VESSEL OR LESION TYPE: Chronic | ICD-10-CM

## 2025-05-29 PROCEDURE — 1036F TOBACCO NON-USER: CPT | Performed by: INTERNAL MEDICINE

## 2025-05-29 PROCEDURE — 3008F BODY MASS INDEX DOCD: CPT | Performed by: INTERNAL MEDICINE

## 2025-05-29 PROCEDURE — 3075F SYST BP GE 130 - 139MM HG: CPT | Performed by: INTERNAL MEDICINE

## 2025-05-29 PROCEDURE — 3078F DIAST BP <80 MM HG: CPT | Performed by: INTERNAL MEDICINE

## 2025-05-29 PROCEDURE — 4010F ACE/ARB THERAPY RXD/TAKEN: CPT | Performed by: INTERNAL MEDICINE

## 2025-05-29 PROCEDURE — 99214 OFFICE O/P EST MOD 30 MIN: CPT | Performed by: INTERNAL MEDICINE

## 2025-05-29 PROCEDURE — 1159F MED LIST DOCD IN RCRD: CPT | Performed by: INTERNAL MEDICINE

## 2025-05-29 PROCEDURE — 1160F RVW MEDS BY RX/DR IN RCRD: CPT | Performed by: INTERNAL MEDICINE

## 2025-05-29 PROCEDURE — 99212 OFFICE O/P EST SF 10 MIN: CPT | Performed by: INTERNAL MEDICINE

## 2025-05-29 NOTE — PATIENT INSTRUCTIONS
1. CAD. Bypass December 2020 LIMA to the LAD SVG to the PDA OM1 with a Y graft to the OM 2. At the same time he had an AVR and aortic root replacement. Doing well. Post-op CVA. Doing great. Continue Plavix. No symptoms of angina.  Physically remains active although is not exercising.     2. Aortic valve replacement.  This was done due to a bicuspid aortic valve.  He had a large Perceval bioprosthetic aortic valve replacement.  TTE 7/19/2024 EF 45 to 50%, DD F, bioprosthetic aortic valve with prosthetic aortic graft    3. Thoracic aortic aneurysm measuring 4.6 cm. At the time of surgery was noted to be 5.2 cm. Status post replacement with a #32 Gelweave graft. There is mild atherosclerotic disease on the ILANA.     4. Cardiomyopathy. Preoperative ejection fraction 35%. Postoperative ejection fraction 40 to 45%. On his repeat echo, the ejection fraction is once again 40 to 45%. There is severe hypokinesis to akinesis of the anterior wall consistent with his known totally occluded LAD. These findings were confirmed on the ILANA September 2022.  Continue with losartan, metoprolol, and Farxiga.  He does take Lasix 40 mg daily.  TTE 7/19/2024 EF 45 to 50%, DD F, bioprosthetic aortic valve with prosthetic aortic graft.    5. CVA-Post-op from [facility]. He subsequently had a repeat CVA September 2022. The etiology was not clear.  Stroke neurology had evaluated patient.  He does have an anteroapical wall motion abnormality in the distribution of the totally occluded LAD.  For this reason he has been managed with clopidogrel and with Xarelto.    6. Hyperlipidemia.  Followed by PCP.  5/7/2024 LDL 60 HDL 34 triglycerides 217.  This should be repeated soon    7. Hypertension.  Blood pressures are excellent today    Doing well.  RTC 6 months

## 2025-06-05 ENCOUNTER — APPOINTMENT (OUTPATIENT)
Dept: CARDIOLOGY | Facility: CLINIC | Age: 70
End: 2025-06-05
Payer: MEDICARE

## 2025-07-01 PROCEDURE — RXMED WILLOW AMBULATORY MEDICATION CHARGE

## 2025-07-02 ENCOUNTER — PHARMACY VISIT (OUTPATIENT)
Dept: PHARMACY | Facility: CLINIC | Age: 70
End: 2025-07-02
Payer: COMMERCIAL

## 2025-07-09 ENCOUNTER — HOSPITAL ENCOUNTER (OUTPATIENT)
Dept: CARDIOLOGY | Facility: HOSPITAL | Age: 70
Discharge: HOME | End: 2025-07-09
Payer: MEDICARE

## 2025-07-09 DIAGNOSIS — Z95.2 STATUS POST AORTIC VALVE REPLACEMENT: ICD-10-CM

## 2025-07-09 LAB
AORTIC VALVE MEAN GRADIENT: 12 MMHG
AORTIC VALVE PEAK VELOCITY: 2.15 M/S
AV PEAK GRADIENT: 18 MMHG
AVA (PEAK VEL): 1.62 CM2
AVA (VTI): 1.5 CM2
EJECTION FRACTION APICAL 4 CHAMBER: 42.1
EJECTION FRACTION: 40 %
LEFT ATRIUM VOLUME AREA LENGTH INDEX BSA: 35.1 ML/M2
LEFT VENTRICULAR OUTFLOW TRACT DIAMETER: 1.83 CM
MITRAL VALVE E/A RATIO: 0.93
RIGHT VENTRICLE FREE WALL PEAK S': 8 CM/S
TRICUSPID ANNULAR PLANE SYSTOLIC EXCURSION: 1.9 CM

## 2025-07-09 PROCEDURE — 2500000004 HC RX 250 GENERAL PHARMACY W/ HCPCS (ALT 636 FOR OP/ED): Performed by: INTERNAL MEDICINE

## 2025-07-09 PROCEDURE — 93306 TTE W/DOPPLER COMPLETE: CPT | Performed by: INTERNAL MEDICINE

## 2025-07-09 PROCEDURE — C8929 TTE W OR WO FOL WCON,DOPPLER: HCPCS

## 2025-07-09 RX ADMIN — HUMAN ALBUMIN MICROSPHERES AND PERFLUTREN 1.5 ML: 10; .22 INJECTION, SOLUTION INTRAVENOUS at 10:48

## 2025-07-16 ENCOUNTER — OFFICE VISIT (OUTPATIENT)
Dept: CARDIAC SURGERY | Facility: HOSPITAL | Age: 70
End: 2025-07-16
Payer: MEDICARE

## 2025-07-16 VITALS
DIASTOLIC BLOOD PRESSURE: 68 MMHG | HEART RATE: 81 BPM | BODY MASS INDEX: 27.11 KG/M2 | WEIGHT: 178.9 LBS | HEIGHT: 68 IN | SYSTOLIC BLOOD PRESSURE: 124 MMHG

## 2025-07-16 DIAGNOSIS — Z95.2 S/P AVR (AORTIC VALVE REPLACEMENT): Primary | ICD-10-CM

## 2025-07-16 DIAGNOSIS — Z95.1 S/P CABG X 4: ICD-10-CM

## 2025-07-16 DIAGNOSIS — Z95.828 S/P ASCENDING AORTIC REPLACEMENT: ICD-10-CM

## 2025-07-16 PROCEDURE — 99215 OFFICE O/P EST HI 40 MIN: CPT

## 2025-07-16 PROCEDURE — 3074F SYST BP LT 130 MM HG: CPT

## 2025-07-16 PROCEDURE — 4010F ACE/ARB THERAPY RXD/TAKEN: CPT

## 2025-07-16 PROCEDURE — 3078F DIAST BP <80 MM HG: CPT

## 2025-07-16 PROCEDURE — 99212 OFFICE O/P EST SF 10 MIN: CPT

## 2025-07-16 PROCEDURE — 1159F MED LIST DOCD IN RCRD: CPT

## 2025-07-16 PROCEDURE — G2211 COMPLEX E/M VISIT ADD ON: HCPCS

## 2025-07-16 PROCEDURE — 1126F AMNT PAIN NOTED NONE PRSNT: CPT

## 2025-07-16 PROCEDURE — 3008F BODY MASS INDEX DOCD: CPT

## 2025-07-16 ASSESSMENT — PAIN SCALES - GENERAL: PAINLEVEL_OUTOF10: 0-NO PAIN

## 2025-07-16 ASSESSMENT — ENCOUNTER SYMPTOMS
LOSS OF SENSATION IN FEET: 0
DECREASED APPETITE: 0
COUGH: 0
FEVER: 0
NEAR-SYNCOPE: 0
DEPRESSION: 0
CHILLS: 0
OCCASIONAL FEELINGS OF UNSTEADINESS: 0
WHEEZING: 0
IRREGULAR HEARTBEAT: 0

## 2025-07-16 ASSESSMENT — COLUMBIA-SUICIDE SEVERITY RATING SCALE - C-SSRS
2. HAVE YOU ACTUALLY HAD ANY THOUGHTS OF KILLING YOURSELF?: NO
6. HAVE YOU EVER DONE ANYTHING, STARTED TO DO ANYTHING, OR PREPARED TO DO ANYTHING TO END YOUR LIFE?: NO
1. IN THE PAST MONTH, HAVE YOU WISHED YOU WERE DEAD OR WISHED YOU COULD GO TO SLEEP AND NOT WAKE UP?: NO

## 2025-07-16 NOTE — PROGRESS NOTES
Subjective   Duane Jin is a 69 y.o. male.    Chief Complaint:  Follow-up (H/O AVR)    HPI  Mr. Isaiah Jin is a 69-year-old male who who underwent coronary  Artery bypass grafting (CABG) x 4, ascending aortic replacement, and aortic valve replacement with Perceval large performed by Dr. Álvarez on January 4, 2021.  He presents today accompanied by his daughter Marry and wife Karol on the phone.  Patient reports that he is doing well overall and denies chest pain, shortness of breath, palpitations, dizziness, or syncope.    Marry informed me that patient plans on to get back to exercising, since he has recovered from his knee replacement, which he had performed earlier this year.  There was a recent transthoracic echocardiogram performed recently that will be reviewed and discussed during today's visit.      Review of Systems   Constitutional: Negative for chills, decreased appetite and fever.   Cardiovascular:  Negative for irregular heartbeat and near-syncope.   Respiratory:  Negative for cough and wheezing.        Objective   Constitutional:       Appearance: Healthy appearance.   Psychiatric:         Attention and Perception: Attention normal.         Speech: Speech normal.         Behavior: Behavior is cooperative.         Judgment: Judgment normal.       Assessment/Plan   In summary, Mr. Jin is doing well overall.  He is now nearly 4 years status post aortic valve replacement (AVR), ascending aortic replacement, and CABG x 4.  We discussed that his recent transthoracic echocardiogram performed on July 9 reveals an prosthetic aortic valve with no evidence of regurgitation, normal peak and mean gradients and a moderately reduced left ventricular ejection fraction (LVEF) at 40%.      Plan:    Continued surveillance follow-up with repeat echo in 1 year.    Order entered for transthoracic echocardiogram.    Schedule office visit for results of echo in 1 year.     Continue cardiovascular manage  met with cardiologist and report any new or worsening cardiac symptoms promptly.    Patient advised to call with any concerns, issues and/or questions.    Time: 40 minutes

## 2025-08-05 PROCEDURE — RXMED WILLOW AMBULATORY MEDICATION CHARGE

## 2025-08-07 ENCOUNTER — PHARMACY VISIT (OUTPATIENT)
Dept: PHARMACY | Facility: CLINIC | Age: 70
End: 2025-08-07
Payer: COMMERCIAL

## 2025-08-07 DIAGNOSIS — I10 ESSENTIAL (PRIMARY) HYPERTENSION: Primary | Chronic | ICD-10-CM

## 2025-08-07 DIAGNOSIS — E11.9 TYPE 2 DIABETES MELLITUS WITHOUT COMPLICATION, WITHOUT LONG-TERM CURRENT USE OF INSULIN: ICD-10-CM

## 2025-08-07 DIAGNOSIS — Z12.5 SCREENING FOR PROSTATE CANCER: ICD-10-CM

## 2025-08-14 LAB
ALBUMIN SERPL-MCNC: 4.7 G/DL (ref 3.6–5.1)
ALBUMIN/CREAT UR: 7 MG/G CREAT
ALP SERPL-CCNC: 42 U/L (ref 35–144)
ALT SERPL-CCNC: 13 U/L (ref 9–46)
ANION GAP SERPL CALCULATED.4IONS-SCNC: 9 MMOL/L (CALC) (ref 7–17)
APPEARANCE UR: CLEAR
AST SERPL-CCNC: 13 U/L (ref 10–35)
BASOPHILS # BLD AUTO: 29 CELLS/UL (ref 0–200)
BASOPHILS NFR BLD AUTO: 0.5 %
BILIRUB SERPL-MCNC: 0.4 MG/DL (ref 0.2–1.2)
BILIRUB UR QL STRIP: NEGATIVE
BUN SERPL-MCNC: 28 MG/DL (ref 7–25)
CALCIUM SERPL-MCNC: 9.7 MG/DL (ref 8.6–10.3)
CHLORIDE SERPL-SCNC: 103 MMOL/L (ref 98–110)
CHOLEST SERPL-MCNC: 150 MG/DL
CHOLEST/HDLC SERPL: 4.7 (CALC)
CO2 SERPL-SCNC: 25 MMOL/L (ref 20–32)
COLOR UR: YELLOW
CREAT SERPL-MCNC: 1.35 MG/DL (ref 0.7–1.28)
CREAT UR-MCNC: 102 MG/DL (ref 20–320)
EGFRCR SERPLBLD CKD-EPI 2021: 56 ML/MIN/1.73M2
EOSINOPHIL # BLD AUTO: 217 CELLS/UL (ref 15–500)
EOSINOPHIL NFR BLD AUTO: 3.8 %
ERYTHROCYTE [DISTWIDTH] IN BLOOD BY AUTOMATED COUNT: 13.3 % (ref 11–15)
EST. AVERAGE GLUCOSE BLD GHB EST-MCNC: 177 MG/DL
EST. AVERAGE GLUCOSE BLD GHB EST-SCNC: 9.8 MMOL/L
GLUCOSE SERPL-MCNC: 133 MG/DL (ref 65–99)
GLUCOSE UR QL STRIP: ABNORMAL
HBA1C MFR BLD: 7.8 %
HCT VFR BLD AUTO: 39.4 % (ref 38.5–50)
HDLC SERPL-MCNC: 32 MG/DL
HGB BLD-MCNC: 13.1 G/DL (ref 13.2–17.1)
HGB UR QL STRIP: NEGATIVE
KETONES UR QL STRIP: NEGATIVE
LDLC SERPL CALC-MCNC: 80 MG/DL (CALC)
LEUKOCYTE ESTERASE UR QL STRIP: NEGATIVE
LYMPHOCYTES # BLD AUTO: 1414 CELLS/UL (ref 850–3900)
LYMPHOCYTES NFR BLD AUTO: 24.8 %
MCH RBC QN AUTO: 30.6 PG (ref 27–33)
MCHC RBC AUTO-ENTMCNC: 33.2 G/DL (ref 32–36)
MCV RBC AUTO: 92.1 FL (ref 80–100)
MICROALBUMIN UR-MCNC: 0.7 MG/DL
MONOCYTES # BLD AUTO: 604 CELLS/UL (ref 200–950)
MONOCYTES NFR BLD AUTO: 10.6 %
NEUTROPHILS # BLD AUTO: 3437 CELLS/UL (ref 1500–7800)
NEUTROPHILS NFR BLD AUTO: 60.3 %
NITRITE UR QL STRIP: NEGATIVE
NONHDLC SERPL-MCNC: 118 MG/DL (CALC)
PH UR STRIP: 5.5 [PH] (ref 5–8)
PLATELET # BLD AUTO: 252 THOUSAND/UL (ref 140–400)
PMV BLD REES-ECKER: 9.8 FL (ref 7.5–12.5)
POTASSIUM SERPL-SCNC: 4.2 MMOL/L (ref 3.5–5.3)
PROT SERPL-MCNC: 7.1 G/DL (ref 6.1–8.1)
PROT UR QL STRIP: NEGATIVE
PSA SERPL-MCNC: 1.08 NG/ML
RBC # BLD AUTO: 4.28 MILLION/UL (ref 4.2–5.8)
SODIUM SERPL-SCNC: 137 MMOL/L (ref 135–146)
SP GR UR STRIP: 1.03 (ref 1–1.03)
TRIGL SERPL-MCNC: 286 MG/DL
WBC # BLD AUTO: 5.7 THOUSAND/UL (ref 3.8–10.8)

## 2025-08-19 ENCOUNTER — APPOINTMENT (OUTPATIENT)
Dept: PRIMARY CARE | Facility: CLINIC | Age: 70
End: 2025-08-19
Payer: MEDICARE

## 2025-08-19 VITALS
HEIGHT: 68 IN | SYSTOLIC BLOOD PRESSURE: 138 MMHG | HEART RATE: 49 BPM | OXYGEN SATURATION: 93 % | WEIGHT: 179.6 LBS | TEMPERATURE: 98.4 F | DIASTOLIC BLOOD PRESSURE: 72 MMHG | BODY MASS INDEX: 27.22 KG/M2

## 2025-08-19 DIAGNOSIS — Z00.00 ROUTINE GENERAL MEDICAL EXAMINATION AT HEALTH CARE FACILITY: Primary | ICD-10-CM

## 2025-08-19 DIAGNOSIS — I50.42 CHRONIC COMBINED SYSTOLIC AND DIASTOLIC HEART FAILURE: ICD-10-CM

## 2025-08-19 DIAGNOSIS — E78.2 MIXED HYPERLIPIDEMIA: Chronic | ICD-10-CM

## 2025-08-19 DIAGNOSIS — N18.6 END STAGE RENAL DISEASE (MULTI): ICD-10-CM

## 2025-08-19 DIAGNOSIS — I63.9 ACUTE CVA (CEREBROVASCULAR ACCIDENT) (MULTI): Chronic | ICD-10-CM

## 2025-08-19 DIAGNOSIS — I10 ESSENTIAL (PRIMARY) HYPERTENSION: Chronic | ICD-10-CM

## 2025-08-19 DIAGNOSIS — R56.9 SEIZURE (MULTI): ICD-10-CM

## 2025-08-19 DIAGNOSIS — I63.119 CEREBROVASCULAR ACCIDENT (CVA) DUE TO EMBOLISM OF VERTEBRAL ARTERY, UNSPECIFIED BLOOD VESSEL LATERALITY (MULTI): Chronic | ICD-10-CM

## 2025-08-19 DIAGNOSIS — I69.354 HEMIPLEGIA AND HEMIPARESIS FOLLOWING CEREBRAL INFARCTION AFFECTING LEFT NON-DOMINANT SIDE (MULTI): ICD-10-CM

## 2025-08-19 DIAGNOSIS — J81.0 ACUTE PULMONARY EDEMA: ICD-10-CM

## 2025-08-19 DIAGNOSIS — Z95.2 S/P AVR (AORTIC VALVE REPLACEMENT): Chronic | ICD-10-CM

## 2025-08-19 DIAGNOSIS — I63.9 CARDIOEMBOLIC STROKE (MULTI): ICD-10-CM

## 2025-08-19 DIAGNOSIS — I63.9 RIGHT HEMISPHERE, CEREBRAL INFARCTION (MULTI): Chronic | ICD-10-CM

## 2025-08-19 DIAGNOSIS — R56.9 CONVULSIONS, UNSPECIFIED CONVULSION TYPE (MULTI): ICD-10-CM

## 2025-08-19 DIAGNOSIS — I50.9 HEART FAILURE, UNSPECIFIED HF CHRONICITY, UNSPECIFIED HEART FAILURE TYPE: ICD-10-CM

## 2025-08-19 DIAGNOSIS — E11.9 TYPE 2 DIABETES MELLITUS WITHOUT COMPLICATION, WITHOUT LONG-TERM CURRENT USE OF INSULIN: ICD-10-CM

## 2025-08-19 PROCEDURE — 1126F AMNT PAIN NOTED NONE PRSNT: CPT | Performed by: FAMILY MEDICINE

## 2025-08-19 PROCEDURE — 4010F ACE/ARB THERAPY RXD/TAKEN: CPT | Performed by: FAMILY MEDICINE

## 2025-08-19 PROCEDURE — G0442 ANNUAL ALCOHOL SCREEN 15 MIN: HCPCS | Performed by: FAMILY MEDICINE

## 2025-08-19 PROCEDURE — 1036F TOBACCO NON-USER: CPT | Performed by: FAMILY MEDICINE

## 2025-08-19 PROCEDURE — 3075F SYST BP GE 130 - 139MM HG: CPT | Performed by: FAMILY MEDICINE

## 2025-08-19 PROCEDURE — 3078F DIAST BP <80 MM HG: CPT | Performed by: FAMILY MEDICINE

## 2025-08-19 PROCEDURE — 99215 OFFICE O/P EST HI 40 MIN: CPT | Performed by: FAMILY MEDICINE

## 2025-08-19 PROCEDURE — 1159F MED LIST DOCD IN RCRD: CPT | Performed by: FAMILY MEDICINE

## 2025-08-19 PROCEDURE — G0439 PPPS, SUBSEQ VISIT: HCPCS | Performed by: FAMILY MEDICINE

## 2025-08-19 PROCEDURE — 1160F RVW MEDS BY RX/DR IN RCRD: CPT | Performed by: FAMILY MEDICINE

## 2025-08-19 PROCEDURE — 1158F ADVNC CARE PLAN TLK DOCD: CPT | Performed by: FAMILY MEDICINE

## 2025-08-19 PROCEDURE — 3008F BODY MASS INDEX DOCD: CPT | Performed by: FAMILY MEDICINE

## 2025-08-19 PROCEDURE — 1170F FXNL STATUS ASSESSED: CPT | Performed by: FAMILY MEDICINE

## 2025-08-19 PROCEDURE — 1123F ACP DISCUSS/DSCN MKR DOCD: CPT | Performed by: FAMILY MEDICINE

## 2025-08-19 PROCEDURE — 99497 ADVNCD CARE PLAN 30 MIN: CPT | Performed by: FAMILY MEDICINE

## 2025-08-19 ASSESSMENT — PATIENT HEALTH QUESTIONNAIRE - PHQ9
1. LITTLE INTEREST OR PLEASURE IN DOING THINGS: NOT AT ALL
2. FEELING DOWN, DEPRESSED OR HOPELESS: NOT AT ALL
SUM OF ALL RESPONSES TO PHQ9 QUESTIONS 1 AND 2: 0
SUM OF ALL RESPONSES TO PHQ9 QUESTIONS 1 AND 2: 0
2. FEELING DOWN, DEPRESSED OR HOPELESS: NOT AT ALL
1. LITTLE INTEREST OR PLEASURE IN DOING THINGS: NOT AT ALL

## 2025-08-19 ASSESSMENT — ENCOUNTER SYMPTOMS
DEPRESSION: 0
LOSS OF SENSATION IN FEET: 0
OCCASIONAL FEELINGS OF UNSTEADINESS: 0

## 2025-08-19 ASSESSMENT — ACTIVITIES OF DAILY LIVING (ADL)
BATHING: INDEPENDENT
TAKING_MEDICATION: INDEPENDENT
MANAGING_FINANCES: INDEPENDENT
DOING_HOUSEWORK: INDEPENDENT
GROCERY_SHOPPING: INDEPENDENT
DRESSING: INDEPENDENT

## 2025-08-19 ASSESSMENT — PAIN SCALES - GENERAL: PAINLEVEL_OUTOF10: 0-NO PAIN

## 2026-01-13 ENCOUNTER — APPOINTMENT (OUTPATIENT)
Dept: PHARMACY | Facility: HOSPITAL | Age: 71
End: 2026-01-13
Payer: MEDICARE